# Patient Record
Sex: MALE | Race: OTHER | Employment: OTHER | URBAN - METROPOLITAN AREA
[De-identification: names, ages, dates, MRNs, and addresses within clinical notes are randomized per-mention and may not be internally consistent; named-entity substitution may affect disease eponyms.]

---

## 2017-01-11 ENCOUNTER — TELEPHONE (OUTPATIENT)
Dept: FAMILY MEDICINE CLINIC | Facility: CLINIC | Age: 82
End: 2017-01-11

## 2017-01-11 ENCOUNTER — OFFICE VISIT (OUTPATIENT)
Dept: FAMILY MEDICINE CLINIC | Facility: CLINIC | Age: 82
End: 2017-01-11

## 2017-01-11 VITALS
HEIGHT: 66 IN | DIASTOLIC BLOOD PRESSURE: 72 MMHG | WEIGHT: 200 LBS | SYSTOLIC BLOOD PRESSURE: 138 MMHG | RESPIRATION RATE: 18 BRPM | BODY MASS INDEX: 32.14 KG/M2 | HEART RATE: 98 BPM

## 2017-01-11 DIAGNOSIS — R21 RASH: Primary | ICD-10-CM

## 2017-01-11 PROCEDURE — 99214 OFFICE O/P EST MOD 30 MIN: CPT | Performed by: FAMILY MEDICINE

## 2017-01-11 NOTE — PROGRESS NOTES
Dalton Frankel is a 80year old male here for Patient presents with:  Swelling: Swelling & itching on both legs x 2 months       HPI:     Rash  -started about 2 months ago while in [facility]  -has red itchy rash in both legs  -since he came back, rash and SURGERY Left 4-14-15    Comment MMS done for [facility], well dif, inv to left antihelix, AB    SKIN SURGERY  5/4/2015    Comment MMS - BCC to the Left [facility]  2010    Comment Colon Cancer    CATARACT      COLECTOMY        Family History   Probl Sig: Apply topically 2 (two) times daily.          1/11/2017  Candelaria Baker MD    I spent a total of 25 minutes, half of which was spent counseling/coordinating care regarding counseling on management of rash

## 2017-01-11 NOTE — PATIENT INSTRUCTIONS
-- [place] vaselina de espinoza piernas despues cada shower  -- [place] crema de moisturizer (cetaphil, cerave, aquaphor) de la pharmacia  -- empeza triamcinolone crema 2 veces al ankita por 2 semanas  -- tambien puede usar claritin generico (pastilla) para comezon cada di

## 2017-01-31 ENCOUNTER — TELEPHONE (OUTPATIENT)
Dept: FAMILY MEDICINE CLINIC | Facility: CLINIC | Age: 82
End: 2017-01-31

## 2017-01-31 DIAGNOSIS — Z85.828 PERSONAL HISTORY OF OTHER MALIGNANT NEOPLASM OF SKIN: Primary | ICD-10-CM

## 2017-01-31 NOTE — TELEPHONE ENCOUNTER
Daughter  came in with her father. Needs referrral for Dad's skin cancer on his face. Can we please get Ref to [facility] Dr. Shon Salmeron.   Made appt to see Dr. Doretha Blair on 2/22/2017  Can you please call Rosalie Miramontes at [phone]

## 2017-02-01 NOTE — TELEPHONE ENCOUNTER
The patient's daughter, Monty Dye, was informed that the referral for the Dermatologist was placed. She requested for the patient to see Dr. Roque Leal.  Per Humaira Alarcon RN, I informed the patient that the referral was placed, but she needs to wait until it is ap

## 2017-03-10 ENCOUNTER — TELEPHONE (OUTPATIENT)
Dept: FAMILY MEDICINE CLINIC | Facility: CLINIC | Age: 82
End: 2017-03-10

## 2017-03-10 NOTE — TELEPHONE ENCOUNTER
Pt needs additional visits for [facility] dermatology. Pt has mole he needs removed on his nose.

## 2017-03-10 NOTE — TELEPHONE ENCOUNTER
Patient has a referral in the system for dermatology that has visits still available for the patient

## 2017-03-13 DIAGNOSIS — G20 PARKINSON'S DISEASE (HCC): Primary | ICD-10-CM

## 2017-03-13 NOTE — TELEPHONE ENCOUNTER
Medication: Carbidopa-Levodopa    Date of last refill: 01/28/16  [place]     Last office visit: 05/11/16  Due back to clinic per last office note:  RTN in 3 months by 08/11/16  Date next office visit scheduled: No Future

## 2017-03-14 ENCOUNTER — TELEPHONE (OUTPATIENT)
Dept: FAMILY MEDICINE CLINIC | Facility: CLINIC | Age: 82
End: 2017-03-14

## 2017-03-15 ENCOUNTER — TELEPHONE (OUTPATIENT)
Dept: FAMILY MEDICINE CLINIC | Facility: CLINIC | Age: 82
End: 2017-03-15

## 2017-03-15 DIAGNOSIS — C44.91 BASAL CELL CARCINOMA: Primary | ICD-10-CM

## 2017-03-15 NOTE — TELEPHONE ENCOUNTER
Derm office needs referral specifically for surgery. Needs to have codes for surgery  51283 and 69751. Plus 09672 depends on how many layers they have to take off.  Code for repair is 37587  Dr. Josseline Dale

## 2017-03-15 NOTE — TELEPHONE ENCOUNTER
Jasmin, from [facility] called and states that patient needs referral for upcoming Mohs surgery. 3/29/17  [id number], [id number]  Dx Basal cell carcinoma  Referral in system.

## 2017-04-05 ENCOUNTER — OFFICE VISIT (OUTPATIENT)
Dept: NEUROLOGY | Facility: CLINIC | Age: 82
End: 2017-04-05

## 2017-04-05 VITALS
SYSTOLIC BLOOD PRESSURE: 130 MMHG | DIASTOLIC BLOOD PRESSURE: 60 MMHG | WEIGHT: 209 LBS | RESPIRATION RATE: 20 BRPM | BODY MASS INDEX: 34 KG/M2 | HEART RATE: 76 BPM

## 2017-04-05 DIAGNOSIS — G20 PARKINSON'S DISEASE (HCC): Primary | ICD-10-CM

## 2017-04-05 PROCEDURE — 99213 OFFICE O/P EST LOW 20 MIN: CPT | Performed by: OTHER

## 2017-04-05 NOTE — PATIENT INSTRUCTIONS
Follow up in 1 year  Continue same dose Sinemet 25/100 mg 1 tab three times daily     After your visit at the [facility] office  today,  please direct any follow up questions or medication needs to the staff in our  [place] office so that your concerns test has been approved by your insurer. Depending on your insurance carrier, approval may take 3-10 days. It is highly recommended patients contact their insurance carrier directly to determine coverage.   If test is done without insurance authorization, p

## 2017-04-05 NOTE — PROGRESS NOTES
[employer] Progress Note    Patient presents with:  Parkinson's: Rm. 10: unchanged      HPI:  As per my initial H&P from 3/6/15:  \"Benjy WOODS Garett Aguilera is a [de-identified]year old, with history of HTN and HL, who presents for evaluation of [facility] 2/11/2015     First in [facility], then in [address] [place] [address].    • EKG, abnormal 2/13/2015   • Aortic sclerosis ([address] 2/13/2015   • Colon polyps 9-   • Tubular adenoma 9-   • Atrophic gastritis 9-     chronic, inactive   • Malignant neoplasm of colon (H 0.25  Years: 79        Types: Cigarettes      Quit date: 03/10/2015    Smokeless Status: Never Used                        Alcohol Use: Yes           0.0 oz/week       0 Standard drinks or equivalent per week       Comment: Only drink beer when in [place] Coord:  FNF with mild intention tremor bilaterally; R worse than left  Romberg: absent  Gait: mildly slow, slightly broad based, but independent; able to turn without significant difficulty    Labs:  No new imaging    Imaging:  No new imaging         Impr

## 2017-05-02 DIAGNOSIS — E78.00 HYPERCHOLESTEROLEMIA: ICD-10-CM

## 2017-05-02 DIAGNOSIS — I10 ESSENTIAL HYPERTENSION, BENIGN: Primary | ICD-10-CM

## 2017-05-02 RX ORDER — AMLODIPINE BESYLATE 10 MG/1
10 TABLET ORAL DAILY
Qty: 90 TABLET | Refills: 0 | Status: SHIPPED | OUTPATIENT
Start: 2017-05-02 | End: 2017-08-30

## 2017-05-02 RX ORDER — SIMVASTATIN 20 MG
20 TABLET ORAL NIGHTLY
Qty: 90 TABLET | Refills: 0 | Status: SHIPPED | OUTPATIENT
Start: 2017-05-02 | End: 2017-08-30

## 2017-05-02 NOTE — TELEPHONE ENCOUNTER
Carbidopa-levodopa comes from Dr Shelley Weaver and patient was given rx 4/5/17 for qty 90 + 11 refills  Amlodipine and simvastatin approved qty 90 NR per protocol

## 2017-05-02 NOTE — TELEPHONE ENCOUNTER
Pt's granddaughter called and said pt will need a refill on his medications. He will be leaving to go out of town tomorrow for 3 months.   He needs a refill on:    carbidopa-levodopa  MG Oral Tab  AmLODIPine Besylate 10 MG Oral Tab  simvastatin 20 MG

## 2017-08-30 ENCOUNTER — OFFICE VISIT (OUTPATIENT)
Dept: FAMILY MEDICINE CLINIC | Facility: CLINIC | Age: 82
End: 2017-08-30

## 2017-08-30 VITALS
DIASTOLIC BLOOD PRESSURE: 76 MMHG | HEART RATE: 68 BPM | SYSTOLIC BLOOD PRESSURE: 146 MMHG | WEIGHT: 201.81 LBS | HEIGHT: 66 IN | BODY MASS INDEX: 32.43 KG/M2

## 2017-08-30 DIAGNOSIS — Z85.828 HISTORY OF SKIN CANCER: ICD-10-CM

## 2017-08-30 DIAGNOSIS — I10 ESSENTIAL HYPERTENSION, BENIGN: Primary | ICD-10-CM

## 2017-08-30 DIAGNOSIS — R73.9 HYPERGLYCEMIA: ICD-10-CM

## 2017-08-30 DIAGNOSIS — Z12.5 SCREENING FOR MALIGNANT NEOPLASM OF PROSTATE: ICD-10-CM

## 2017-08-30 DIAGNOSIS — R73.03 PREDIABETES: ICD-10-CM

## 2017-08-30 DIAGNOSIS — I51.89 DIASTOLIC DYSFUNCTION WITHOUT HEART FAILURE: ICD-10-CM

## 2017-08-30 DIAGNOSIS — Z72.51 UNPROTECTED SEX: ICD-10-CM

## 2017-08-30 DIAGNOSIS — Z11.3 ENCOUNTER FOR SCREENING FOR INFECTIONS WITH A PREDOMINANTLY SEXUAL MODE OF TRANSMISSION: ICD-10-CM

## 2017-08-30 DIAGNOSIS — E78.00 HYPERCHOLESTEROLEMIA: ICD-10-CM

## 2017-08-30 DIAGNOSIS — IMO0001 ALCOHOLISM /ALCOHOL ABUSE: ICD-10-CM

## 2017-08-30 DIAGNOSIS — Z11.3 ROUTINE SCREENING FOR STI (SEXUALLY TRANSMITTED INFECTION): ICD-10-CM

## 2017-08-30 DIAGNOSIS — I35.8 AORTIC VALVE SCLEROSIS: ICD-10-CM

## 2017-08-30 PROCEDURE — 99214 OFFICE O/P EST MOD 30 MIN: CPT | Performed by: FAMILY MEDICINE

## 2017-08-30 PROCEDURE — 87491 CHLMYD TRACH DNA AMP PROBE: CPT | Performed by: FAMILY MEDICINE

## 2017-08-30 PROCEDURE — 87591 N.GONORRHOEAE DNA AMP PROB: CPT | Performed by: FAMILY MEDICINE

## 2017-08-30 RX ORDER — AMLODIPINE BESYLATE 10 MG/1
10 TABLET ORAL DAILY
Qty: 90 TABLET | Refills: 1 | Status: SHIPPED | OUTPATIENT
Start: 2017-08-30 | End: 2018-03-02

## 2017-08-30 RX ORDER — SIMVASTATIN 20 MG
20 TABLET ORAL NIGHTLY
Qty: 90 TABLET | Refills: 1 | Status: SHIPPED | OUTPATIENT
Start: 2017-08-30 | End: 2018-03-02

## 2017-08-30 NOTE — PROGRESS NOTES
Sugar Huitron is a 80year old male. HPI:     Patient is accompanied by his daughter-in-law and granddaughter, they help with interpreting especially the granddaughter. HTN: Uncontrolled.   Patient has been off of his amlodipine for at least 2 m Disp: 90 tablet Rfl: 1   triamcinolone acetonide 0.1 % External Cream Apply topically 2 (two) times daily.  Disp: 60 g Rfl: 0      Past Medical History:   Diagnosis Date   • Alcoholism /alcohol abuse ([facility] 8/30/2017    Episodic   • Anesthesia complication Comment: MMS - BCC to the Left [place]   3/29/17: SKIN SURGERY      Comment: MMS- BCC-micronod to right nasofacial sulcus                done by AB  9-: UPPER GI ENDOSCOPY PERFORMED   Social History:    Smoking status: Former Smoker intercostal space right sternal border. VASCULAR: Radial pulses 2+ b/l.   No pretibial pitting edema  GI: normal bowel sounds, NT/ND, no pulsations, no r/r/g, no masses, no HSM appreciated  EXTREMITIES: no cyanosis or clubbing  NEURO: Alert and Oriented x3 Unprotected sex  As above in #7.  - T PALLIDUM SCREENING CASCADE; Future  - HIV AG AB COMBO; Future  - HEPATITIS B SURFACE ANTIBODY; Future  - HEPATITIS B SURFACE ANTIGEN; Future  - HCV ANTIBODY; Future  - CHLAMYDIA/GONOCOCCUS, LAKEISHA    9.  Prediabetes  Reche

## 2017-08-31 ENCOUNTER — LAB ENCOUNTER (OUTPATIENT)
Dept: LAB | Age: 82
End: 2017-08-31
Attending: FAMILY MEDICINE
Payer: MEDICARE

## 2017-08-31 DIAGNOSIS — Z11.3 ENCOUNTER FOR SCREENING FOR INFECTIONS WITH A PREDOMINANTLY SEXUAL MODE OF TRANSMISSION: ICD-10-CM

## 2017-08-31 DIAGNOSIS — R73.9 HYPERGLYCEMIA: ICD-10-CM

## 2017-08-31 DIAGNOSIS — R73.03 PREDIABETES: ICD-10-CM

## 2017-08-31 DIAGNOSIS — Z72.51 UNPROTECTED SEX: ICD-10-CM

## 2017-08-31 DIAGNOSIS — I10 ESSENTIAL HYPERTENSION, BENIGN: ICD-10-CM

## 2017-08-31 DIAGNOSIS — Z12.5 SCREENING FOR MALIGNANT NEOPLASM OF PROSTATE: ICD-10-CM

## 2017-08-31 LAB
ALBUMIN SERPL-MCNC: 3.5 G/DL (ref 3.5–4.8)
ALP LIVER SERPL-CCNC: 71 U/L (ref 45–117)
ALT SERPL-CCNC: 16 U/L (ref 17–63)
AST SERPL-CCNC: 17 U/L (ref 15–41)
BASOPHILS # BLD AUTO: 0.06 X10(3) UL (ref 0–0.1)
BASOPHILS NFR BLD AUTO: 0.9 %
BILIRUB SERPL-MCNC: 0.4 MG/DL (ref 0.1–2)
BUN BLD-MCNC: 15 MG/DL (ref 8–20)
C TRACH DNA SPEC QL NAA+PROBE: NEGATIVE
CALCIUM BLD-MCNC: 9.3 MG/DL (ref 8.3–10.3)
CHLORIDE: 108 MMOL/L (ref 101–111)
CHOLEST SMN-MCNC: 208 MG/DL (ref ?–200)
CO2: 27 MMOL/L (ref 22–32)
COMPLEXED PSA SERPL-MCNC: 2.13 NG/ML (ref 0.01–4)
CREAT BLD-MCNC: 0.6 MG/DL (ref 0.7–1.3)
EOSINOPHIL # BLD AUTO: 0.06 X10(3) UL (ref 0–0.3)
EOSINOPHIL NFR BLD AUTO: 0.9 %
ERYTHROCYTE [DISTWIDTH] IN BLOOD BY AUTOMATED COUNT: 14.1 % (ref 11.5–16)
EST. AVERAGE GLUCOSE BLD GHB EST-MCNC: 131 MG/DL (ref 68–126)
FREE T4: 1 NG/DL (ref 0.9–1.8)
GLUCOSE BLD-MCNC: 99 MG/DL (ref 70–99)
HBA1C MFR BLD HPLC: 6.2 % (ref ?–5.7)
HBV SURFACE AB SER QL: NONREACTIVE
HBV SURFACE AB SERPL IA-ACNC: <3.1 MIU/ML
HBV SURFACE AG SERPL QL IA: NONREACTIVE
HCT VFR BLD AUTO: 53.2 % (ref 37–53)
HDLC SERPL-MCNC: 54 MG/DL (ref 45–?)
HDLC SERPL: 3.85 {RATIO} (ref ?–4.97)
HEPATITIS C VIRUS AB INTERPRETATION: NONREACTIVE
HGB BLD-MCNC: 17.1 G/DL (ref 13–17)
IMMATURE GRANULOCYTE COUNT: 0.02 X10(3) UL (ref 0–1)
IMMATURE GRANULOCYTE RATIO %: 0.3 %
LDLC SERPL CALC-MCNC: 133 MG/DL (ref ?–130)
LDLC SERPL-MCNC: 21 MG/DL (ref 5–40)
LYMPHOCYTES # BLD AUTO: 1.76 X10(3) UL (ref 0.9–4)
LYMPHOCYTES NFR BLD AUTO: 27.4 %
M PROTEIN MFR SERPL ELPH: 7.5 G/DL (ref 6.1–8.3)
MCH RBC QN AUTO: 30 PG (ref 27–33.2)
MCHC RBC AUTO-ENTMCNC: 32.1 G/DL (ref 31–37)
MCV RBC AUTO: 93.3 FL (ref 80–99)
MONOCYTES # BLD AUTO: 0.51 X10(3) UL (ref 0.1–0.6)
MONOCYTES NFR BLD AUTO: 7.9 %
N GONORRHOEA DNA SPEC QL NAA+PROBE: NEGATIVE
NEUTROPHIL ABS PRELIM: 4.01 X10 (3) UL (ref 1.3–6.7)
NEUTROPHILS # BLD AUTO: 4.01 X10(3) UL (ref 1.3–6.7)
NEUTROPHILS NFR BLD AUTO: 62.6 %
NONHDLC SERPL-MCNC: 154 MG/DL (ref ?–130)
PLATELET # BLD AUTO: 196 10(3)UL (ref 150–450)
POTASSIUM SERPL-SCNC: 4 MMOL/L (ref 3.6–5.1)
RBC # BLD AUTO: 5.7 X10(6)UL (ref 3.8–5.8)
RED CELL DISTRIBUTION WIDTH-SD: 48.8 FL (ref 35.1–46.3)
SODIUM SERPL-SCNC: 142 MMOL/L (ref 136–144)
T PALLIDUM AB SER QL IA: NONREACTIVE
TRIGLYCERIDES: 105 MG/DL (ref ?–150)
TSI SER-ACNC: 1.81 MIU/ML (ref 0.35–5.5)
WBC # BLD AUTO: 6.4 X10(3) UL (ref 4–13)

## 2017-08-31 PROCEDURE — 36415 COLL VENOUS BLD VENIPUNCTURE: CPT | Performed by: FAMILY MEDICINE

## 2017-08-31 PROCEDURE — 86803 HEPATITIS C AB TEST: CPT | Performed by: FAMILY MEDICINE

## 2017-08-31 PROCEDURE — 80053 COMPREHEN METABOLIC PANEL: CPT | Performed by: FAMILY MEDICINE

## 2017-08-31 PROCEDURE — 84439 ASSAY OF FREE THYROXINE: CPT | Performed by: FAMILY MEDICINE

## 2017-08-31 PROCEDURE — 84443 ASSAY THYROID STIM HORMONE: CPT | Performed by: FAMILY MEDICINE

## 2017-08-31 PROCEDURE — 86780 TREPONEMA PALLIDUM: CPT | Performed by: FAMILY MEDICINE

## 2017-08-31 PROCEDURE — 87389 HIV-1 AG W/HIV-1&-2 AB AG IA: CPT | Performed by: FAMILY MEDICINE

## 2017-08-31 PROCEDURE — 86706 HEP B SURFACE ANTIBODY: CPT | Performed by: FAMILY MEDICINE

## 2017-08-31 PROCEDURE — 80061 LIPID PANEL: CPT | Performed by: FAMILY MEDICINE

## 2017-08-31 PROCEDURE — 83036 HEMOGLOBIN GLYCOSYLATED A1C: CPT | Performed by: FAMILY MEDICINE

## 2017-08-31 PROCEDURE — 85025 COMPLETE CBC W/AUTO DIFF WBC: CPT | Performed by: FAMILY MEDICINE

## 2017-08-31 PROCEDURE — G0103 PSA SCREENING: HCPCS | Performed by: FAMILY MEDICINE

## 2017-08-31 PROCEDURE — 87340 HEPATITIS B SURFACE AG IA: CPT | Performed by: FAMILY MEDICINE

## 2017-09-06 ENCOUNTER — TELEPHONE (OUTPATIENT)
Dept: FAMILY MEDICINE CLINIC | Facility: CLINIC | Age: 82
End: 2017-09-06

## 2017-09-06 NOTE — TELEPHONE ENCOUNTER
----- Message from Demetra Gómez DO sent at 9/5/2017  6:17 PM CDT -----  Please call patient's daughter or granddaughter: Screening for sexually transmitted infections are negative.   Please have patient make an appointment to see me for follow-up on the

## 2017-09-06 NOTE — TELEPHONE ENCOUNTER
The patient's granddaughter, Wilda Mcardle, was informed of the patient's test results and Dr. Eric Alejandre recommendations. Per Dr. Amos Maria, follow up appointment was scheduled to discuss other blood test results and prediabetes worsening.

## 2017-09-11 ENCOUNTER — OFFICE VISIT (OUTPATIENT)
Dept: FAMILY MEDICINE CLINIC | Facility: CLINIC | Age: 82
End: 2017-09-11

## 2017-09-11 VITALS
RESPIRATION RATE: 16 BRPM | HEIGHT: 66 IN | TEMPERATURE: 98 F | BODY MASS INDEX: 32.78 KG/M2 | SYSTOLIC BLOOD PRESSURE: 145 MMHG | WEIGHT: 204 LBS | DIASTOLIC BLOOD PRESSURE: 68 MMHG | HEART RATE: 64 BPM

## 2017-09-11 DIAGNOSIS — R73.9 HYPERGLYCEMIA: ICD-10-CM

## 2017-09-11 DIAGNOSIS — I10 ESSENTIAL HYPERTENSION, BENIGN: Primary | ICD-10-CM

## 2017-09-11 DIAGNOSIS — Z23 NEED FOR INFLUENZA VACCINATION: ICD-10-CM

## 2017-09-11 DIAGNOSIS — R73.03 PREDIABETES: ICD-10-CM

## 2017-09-11 DIAGNOSIS — E78.00 HYPERCHOLESTEROLEMIA: ICD-10-CM

## 2017-09-11 PROCEDURE — 90653 IIV ADJUVANT VACCINE IM: CPT | Performed by: FAMILY MEDICINE

## 2017-09-11 PROCEDURE — 99214 OFFICE O/P EST MOD 30 MIN: CPT | Performed by: FAMILY MEDICINE

## 2017-09-11 PROCEDURE — G0008 ADMIN INFLUENZA VIRUS VAC: HCPCS | Performed by: FAMILY MEDICINE

## 2017-09-11 RX ORDER — CHLORTHALIDONE 25 MG/1
25 TABLET ORAL EVERY MORNING
Qty: 30 TABLET | Refills: 2 | Status: SHIPPED | OUTPATIENT
Start: 2017-09-11 | End: 2018-03-02

## 2017-09-11 NOTE — PATIENT INSTRUCTIONS
Hiperglucemia (alto nivel de azúcar en la sean)    El exceso de glucosa (azúcar) en la sean se denomina “hiperglucemia” o “hiperglicemia”. Un nivel de azúcar en la sean alto puede producir madison afección peligrosa llamada cetoacidosis.  En casos grave · Si está enfermo, siga espinoza plan para los días de enfermedad.   · Cumpla con espinoza plan de comidas. Coma solo la cantidad de comida que indica espinoza plan de comidas.   · Siga espinoza plan de ejercicio físico.  · Use espinoza insulina o tome eagle medicamentos para la diabetes

## 2017-09-11 NOTE — PROGRESS NOTES
Thuy Ramey is a 80year old male. HPI:     Patient is accompanied by his daughter-in-law, granddaughter and great granddaughter. His granddaughter helps with translating. HTN:    Uncontrolled. Severity is mild.   Patient restarted the amlo abnormal 2/13/2015   • Esophageal reflux     no longer a problem/ resolved   • Essential hypertension, benign 2/5/2015    Uncontrolled    • High blood pressure    • High cholesterol    • History of hernia repair 2/11/2015    First in [facility], then in [address] [place] [address]. Neg    • Heart Disease Neg    • Stroke Neg      REVIEW OF SYSTEMS:   GENERAL HEALTH: overall feels well, no fever, no change in weight  SKIN: denies any unusual skin lesions or rashes   RESPIRATORY: Denies: GIBRAN/MURPHY/Cough/Wheeze/Orthopnea/PND  CARDIOVASCULA 35   Total Bilirubin      0.1 - 2.0 mg/dL 0.4  0.6   TOTAL PROTEIN      6.1 - 8.3 g/dL 7.5  7.6   Albumin      3.5 - 4.8 g/dL 3.5  3.7   Sodium      136 - 144 mmol/L 142  139   Potassium      3.6 - 5.1 mmol/L 4.0  4.3   Chloride      101 - 111 mmol/L 108 effects and precautions discussed, patient verbalizes understanding. Questions encouraged and answered to patient's satisfaction.           Orders Placed This Encounter      Flu Vaccine, High Dose (V04.81) [id number]    Meds & Refills for this Visit:  Signed P

## 2017-09-26 ENCOUNTER — HOSPITAL ENCOUNTER (OUTPATIENT)
Age: 82
Discharge: HOME | End: 2017-09-26
Payer: MEDICARE

## 2017-09-26 ENCOUNTER — APPOINTMENT (OUTPATIENT)
Dept: CT IMAGING | Age: 82
End: 2017-09-26
Attending: PHYSICIAN ASSISTANT
Payer: MEDICARE

## 2017-09-26 VITALS
OXYGEN SATURATION: 95 % | HEIGHT: 67 IN | HEART RATE: 65 BPM | DIASTOLIC BLOOD PRESSURE: 71 MMHG | WEIGHT: 204 LBS | BODY MASS INDEX: 32.02 KG/M2 | SYSTOLIC BLOOD PRESSURE: 165 MMHG | RESPIRATION RATE: 18 BRPM | TEMPERATURE: 98 F

## 2017-09-26 DIAGNOSIS — R19.7 DIARRHEA, UNSPECIFIED TYPE: ICD-10-CM

## 2017-09-26 DIAGNOSIS — T81.30XA ABDOMINAL WOUND DEHISCENCE, INITIAL ENCOUNTER: Primary | ICD-10-CM

## 2017-09-26 DIAGNOSIS — E87.6 HYPOKALEMIA: ICD-10-CM

## 2017-09-26 DIAGNOSIS — K43.9 VENTRAL HERNIA WITHOUT OBSTRUCTION OR GANGRENE: ICD-10-CM

## 2017-09-26 LAB
#LYMPHOCYTE IC: 1.8 X10ˆ3/UL (ref 0.9–3.2)
#MXD IC: 0.7 X10ˆ3/UL (ref 0.1–1)
#NEUTROPHIL IC: 4.1 X10ˆ3/UL (ref 1.3–6.7)
CREAT SERPL-MCNC: 0.8 MG/DL (ref 0.7–1.2)
CREAT SERPL-MCNC: 0.8 MG/DL (ref 0.7–1.2)
GLUCOSE BLD-MCNC: 116 MG/DL (ref 65–99)
GLUCOSE BLD-MCNC: 117 MG/DL (ref 65–99)
HCT IC: 47.9 % (ref 37–54)
HGB IC: 16 G/DL (ref 12.6–17.4)
ISTAT BLOOD GAS TCO2: 28 MMOL/L (ref 22–32)
ISTAT BLOOD GAS TCO2: 28 MMOL/L (ref 22–32)
ISTAT BUN: 18 MG/DL (ref 8–20)
ISTAT BUN: 19 MG/DL (ref 8–20)
ISTAT CHLORIDE: 100 MMOL/L (ref 101–111)
ISTAT CHLORIDE: 99 MMOL/L (ref 101–111)
ISTAT HEMATOCRIT: 48 % (ref 37–54)
ISTAT HEMATOCRIT: 49 % (ref 37–54)
ISTAT IONIZED CALCIUM: 1.1 MMOL/L (ref 1.12–1.32)
ISTAT IONIZED CALCIUM: 1.17 MMOL/L (ref 1.12–1.32)
ISTAT POTASSIUM: 2.8 MMOL/L (ref 3.6–5.1)
ISTAT POTASSIUM: 3.1 MMOL/L (ref 3.6–5.1)
ISTAT SODIUM: 141 MMOL/L (ref 136–144)
ISTAT SODIUM: 141 MMOL/L (ref 136–144)
LYMPHOCYTES NFR BLD AUTO: 27.1 %
MCH IC: 30 PG (ref 27–33.2)
MCHC IC: 33.4 G/DL (ref 31–37)
MCV IC: 89.9 FL (ref 80–99)
MIXED CELL %: 11.3 %
NEUTROPHILS NFR BLD AUTO: 61.6 %
PLT IC: 209 X10ˆ3/UL (ref 150–450)
POCT BILIRUBIN URINE: NEGATIVE
POCT BLOOD URINE: NEGATIVE
POCT GLUCOSE URINE: NEGATIVE MG/DL
POCT KETONE URINE: NEGATIVE MG/DL
POCT LEUKOCYTE ESTERASE URINE: NEGATIVE
POCT NITRITE URINE: NEGATIVE
POCT PH URINE: 6.5 (ref 5–8)
POCT PROTEIN URINE: NEGATIVE MG/DL
POCT SPECIFIC GRAVITY URINE: 1.02
POCT URINE CLARITY: CLEAR
POCT URINE COLOR: YELLOW
POCT UROBILINOGEN URINE: 0.2 MG/DL
RBC IC: 5.33 X10ˆ6/UL (ref 3.8–5.8)
WBC IC: 6.6 X10ˆ3/UL (ref 4–13)

## 2017-09-26 PROCEDURE — 93010 ELECTROCARDIOGRAM REPORT: CPT

## 2017-09-26 PROCEDURE — 93005 ELECTROCARDIOGRAM TRACING: CPT

## 2017-09-26 PROCEDURE — 36415 COLL VENOUS BLD VENIPUNCTURE: CPT

## 2017-09-26 PROCEDURE — 80047 BASIC METABLC PNL IONIZED CA: CPT

## 2017-09-26 PROCEDURE — 81002 URINALYSIS NONAUTO W/O SCOPE: CPT | Performed by: PHYSICIAN ASSISTANT

## 2017-09-26 PROCEDURE — 85025 COMPLETE CBC W/AUTO DIFF WBC: CPT | Performed by: PHYSICIAN ASSISTANT

## 2017-09-26 PROCEDURE — 74177 CT ABD & PELVIS W/CONTRAST: CPT | Performed by: PHYSICIAN ASSISTANT

## 2017-09-26 PROCEDURE — 99215 OFFICE O/P EST HI 40 MIN: CPT

## 2017-09-26 RX ORDER — POTASSIUM CHLORIDE 20 MEQ/1
40 TABLET, EXTENDED RELEASE ORAL ONCE
Status: COMPLETED | OUTPATIENT
Start: 2017-09-26 | End: 2017-09-26

## 2017-09-26 RX ORDER — POTASSIUM CHLORIDE 750 MG/1
20 TABLET, FILM COATED, EXTENDED RELEASE ORAL 2 TIMES DAILY
Qty: 14 TABLET | Refills: 0 | Status: SHIPPED | OUTPATIENT
Start: 2017-09-26 | End: 2018-03-29

## 2017-09-26 NOTE — ED INITIAL ASSESSMENT (HPI)
Patient presents with heart shaped open wound approx 1las3jw to midabdomen x one day. s/p colon resection 2010. Elidia in [facility] states patient was mowing the grass yesterday and patient noted the wound today after showering. Beefy red and draining serosanguinous l

## 2017-09-26 NOTE — ED PROVIDER NOTES
Patient Seen in: [facility]    History   Patient presents with:  Wound    Stated Complaint: puncture wound on stomach 1 days    HPI    Daisy Gomez an 77-year-old male who comes in today with his daughter-in-law complaining of an ope • Parkinson's disease ([facility] 2/5/2015   • Personal history of antineoplastic chemotherapy     2010 last treatment   • PONV (postoperative nausea and vomiting)    • Retained suture 9/29/2015   • Shoulder injury 1987    left    • Tubular adenoma 9-   • Current:BP (!) 165/71   Pulse 65   Temp 98.1 °F (36.7 °C) (Temporal)   Resp 18   Ht 170.2 cm (5' 7\")   Wt 92.5 kg   SpO2 95%   BMI 31.95 kg/m²         Physical Exam   Constitutional: He is oriented to person, place, and time.  He appears well-developed and Rate, intervals and axes as noted on EKG Report.   Rate: 63bpm  Rhythm: Sinus Rhythm  Reading: AV block and RBBB, discussed with Dr Dixon Bender and compared to previous in 2016 no acute changes, read by computer printout as atrial flutter no     --repeat EKG PROCEDURE:  CT ABDOMEN+PELVIS(CONTRAST ONLY)(CPT=74177)  COMPARISON:  MIGUEL CT ABDOMEN+PELVIS(CPT=74176), 2/14/2015, 10:17.   INDICATIONS:  puncture wound on stomach 1 days  TECHNIQUE:  CT scanning was performed from the dome of the diaphragm to the unchanged. URINARY BLADDER:  Nondistended. PELVIC NODES:  None enlarged. PELVIC ORGANS:  Stable moderate to severe prostatomegaly. BONES:  Stable moderate to severe degenerative changes. LUNG BASES:  Atelectasis and or scarring. No pleural effusion.  OTHER: Potassium Chloride ER 10 MEQ Oral Tab CR          Sig: Take 2 tablets (20 mEq total) by mouth 2 (two) times daily.           Dispense:  14 tablet          Refill:  0    ============================================================  ED Course  -----------

## 2017-09-27 ENCOUNTER — TELEPHONE (OUTPATIENT)
Dept: FAMILY MEDICINE CLINIC | Facility: CLINIC | Age: 82
End: 2017-09-27

## 2017-09-27 ENCOUNTER — TELEPHONE (OUTPATIENT)
Dept: NEUROLOGY | Facility: CLINIC | Age: 82
End: 2017-09-27

## 2017-09-27 DIAGNOSIS — T81.30XA ABDOMINAL WOUND DEHISCENCE, INITIAL ENCOUNTER: Primary | ICD-10-CM

## 2017-09-27 NOTE — TELEPHONE ENCOUNTER
Please call pt's daughter in law Milady Rodriguez or his granddaughter Shay Bravo, pt was referred to a surgeon for consultation, I would like him to see a different surgeon for consultation instead.   Please refer him to Dr. Annelise Che, please provide Dr. West Altman

## 2017-09-27 NOTE — TELEPHONE ENCOUNTER
I spoke with Cristinsarahy Fothergill, pt's granddaughter, she was informed that Dr. Zina Dubose would like him to see Dr. Nate Benitez for follow up, she was given contact information for his office and encouraged to schedule a follow up appt.

## 2017-09-28 NOTE — TELEPHONE ENCOUNTER
Spoke to Jeremías hinojosa and she said that pt has an appt with a doctor in Dr. Radha Coker office on 10/3/17 for a consult. She does not know the doctor's name though. Referral was placed in system yesterday.   No need for patient to make an appt here and Sweta

## 2017-10-03 ENCOUNTER — OFFICE VISIT (OUTPATIENT)
Dept: SURGERY | Facility: CLINIC | Age: 82
End: 2017-10-03

## 2017-10-03 VITALS
TEMPERATURE: 98 F | HEIGHT: 66 IN | SYSTOLIC BLOOD PRESSURE: 139 MMHG | WEIGHT: 204 LBS | DIASTOLIC BLOOD PRESSURE: 81 MMHG | HEART RATE: 73 BPM | BODY MASS INDEX: 32.78 KG/M2

## 2017-10-03 DIAGNOSIS — I10 ESSENTIAL HYPERTENSION, BENIGN: ICD-10-CM

## 2017-10-03 DIAGNOSIS — G20 PARKINSON'S DISEASE (HCC): ICD-10-CM

## 2017-10-03 DIAGNOSIS — Z85.038 HISTORY OF MALIGNANT NEOPLASM OF COLON: ICD-10-CM

## 2017-10-03 DIAGNOSIS — T81.89XA SUTURE GRANULOMA, INITIAL ENCOUNTER: Primary | ICD-10-CM

## 2017-10-03 PROCEDURE — 99203 OFFICE O/P NEW LOW 30 MIN: CPT | Performed by: SURGERY

## 2017-10-03 NOTE — H&P
New Patient Visit Note       Active Problems      1. Suture granuloma, initial encounter    2. Parkinson's disease ([facility]    3. Essential hypertension, benign    4.  History of malignant neoplasm of colon        Chief Complaint   Patient presents with:  Patience Luis Felipe focal fluid collection/abscess. Mild deformity of the anterior abdominal wall,   right lower quadrant is unchanged. URINARY BLADDER:  Nondistended. PELVIC NODES:  None enlarged. PELVIC ORGANS:  Stable moderate to severe prostatomegaly.   BONES:  Stable m surgery scheduled 06/30/16, also left shoulder   • Other and unspecified hyperlipidemia    • Parkinson disease ([facility] 2014   • Parkinson disease ([facility]    • Parkinson's disease ([facility] 2/5/2015   • Personal history of antineoplastic chemotherapy     2010 last coffee and 1 can of coke daily  Exercise                Yes    Comment:walks 3 times weekly         Current Outpatient Prescriptions:  Potassium Chloride ER 10 MEQ Oral Tab CR Take 2 tablets (20 mEq total) by mouth 2 (two) times daily.  Disp: 14 tablet Rfl: Pulmonary/Chest: Effort normal and breath sounds normal. No respiratory distress. He has no wheezes. Abdominal: Bowel sounds are normal. He exhibits no distension. There is no tenderness. There is no rebound.        Healed midline incision with a 1.5 cm

## 2017-10-09 ENCOUNTER — APPOINTMENT (OUTPATIENT)
Dept: LAB | Age: 82
End: 2017-10-09
Attending: SURGERY
Payer: MEDICARE

## 2017-10-09 DIAGNOSIS — Z01.818 PRE-OP TESTING: ICD-10-CM

## 2017-10-09 PROCEDURE — 93010 ELECTROCARDIOGRAM REPORT: CPT | Performed by: INTERNAL MEDICINE

## 2017-10-09 PROCEDURE — 36415 COLL VENOUS BLD VENIPUNCTURE: CPT

## 2017-10-09 PROCEDURE — 93005 ELECTROCARDIOGRAM TRACING: CPT

## 2017-10-09 PROCEDURE — 80053 COMPREHEN METABOLIC PANEL: CPT

## 2017-10-11 ENCOUNTER — TELEPHONE (OUTPATIENT)
Dept: FAMILY MEDICINE CLINIC | Facility: CLINIC | Age: 82
End: 2017-10-11

## 2017-10-11 DIAGNOSIS — T81.89XA SUTURE GRANULOMA, INITIAL ENCOUNTER: Primary | ICD-10-CM

## 2017-10-11 NOTE — TELEPHONE ENCOUNTER
Dr Charles Urbina is the surgeon  Phone # is [phone]    Left message for Vania Lee that she will need to contact Dr Kate Martínez office and have them fax over all information regarding this referral to [phone].  We cannot process referral without this informa

## 2017-10-11 NOTE — TELEPHONE ENCOUNTER
Yesi Ohara is calling to let the office know that Benjy's referral needs to be for Dr Michael Cha, she thinks, whoever is with Dr Noreen Cerna office and [facility], otherwise [employer] will not pay for it.  Any questions please call Macie

## 2017-10-18 ENCOUNTER — TELEPHONE (OUTPATIENT)
Dept: SURGERY | Facility: CLINIC | Age: 82
End: 2017-10-18

## 2017-10-19 ENCOUNTER — LABORATORY ENCOUNTER (OUTPATIENT)
Dept: LAB | Age: 82
End: 2017-10-19
Attending: SURGERY
Payer: MEDICARE

## 2017-10-19 DIAGNOSIS — T81.89XA: Primary | ICD-10-CM

## 2017-10-19 PROCEDURE — 88305 TISSUE EXAM BY PATHOLOGIST: CPT

## 2017-10-27 ENCOUNTER — TELEPHONE (OUTPATIENT)
Dept: NEUROLOGY | Facility: CLINIC | Age: 82
End: 2017-10-27

## 2017-11-02 ENCOUNTER — OFFICE VISIT (OUTPATIENT)
Dept: SURGERY | Facility: CLINIC | Age: 82
End: 2017-11-02

## 2017-11-02 VITALS
TEMPERATURE: 98 F | HEIGHT: 66 IN | SYSTOLIC BLOOD PRESSURE: 135 MMHG | RESPIRATION RATE: 17 BRPM | BODY MASS INDEX: 33.01 KG/M2 | DIASTOLIC BLOOD PRESSURE: 72 MMHG | HEART RATE: 71 BPM | WEIGHT: 205.38 LBS

## 2017-11-02 DIAGNOSIS — K43.2 VENTRAL INCISIONAL HERNIA: ICD-10-CM

## 2017-11-02 DIAGNOSIS — Z85.038 HISTORY OF MALIGNANT NEOPLASM OF COLON: ICD-10-CM

## 2017-11-02 DIAGNOSIS — T81.89XD SUTURE GRANULOMA, SUBSEQUENT ENCOUNTER: Primary | ICD-10-CM

## 2017-11-02 DIAGNOSIS — Z98.890 HISTORY OF HERNIA REPAIR: ICD-10-CM

## 2017-11-02 DIAGNOSIS — Z87.19 HISTORY OF HERNIA REPAIR: ICD-10-CM

## 2017-11-02 PROCEDURE — 99024 POSTOP FOLLOW-UP VISIT: CPT | Performed by: PHYSICIAN ASSISTANT

## 2017-11-02 RX ORDER — HYDROCODONE BITARTRATE AND ACETAMINOPHEN 5; 325 MG/1; MG/1
TABLET ORAL
Refills: 0 | COMMUNITY
Start: 2017-10-19 | End: 2018-01-22 | Stop reason: ALTCHOICE

## 2017-11-02 NOTE — PROGRESS NOTES
Post Operative Visit Note       Active Problems  1. Suture granuloma, subsequent encounter    2. History of malignant neoplasm of colon    3. History of hernia repair    4.  Ventral incisional hernia         Chief Complaint   Patient presents with:  Suture Parkinson disease ([facility] 2014   • Parkinson disease ([facility]    • Parkinson's disease ([facility] 2/5/2015   • Personal history of antineoplastic chemotherapy     2010 last treatment   • PONV (postoperative nausea and vomiting)    • Retained suture 9/29/2015   • Radha weekly         Current Outpatient Prescriptions:  Potassium Chloride ER 10 MEQ Oral Tab CR Take 2 tablets (20 mEq total) by mouth 2 (two) times daily.  Disp: 14 tablet Rfl: 0   chlorthalidone 25 MG Oral Tab Take 1 tablet (25 mg total) by mouth every morning reviewed.           Assessment   Suture granuloma, subsequent encounter  (primary encounter diagnosis)  History of malignant neoplasm of colon  History of hernia repair  Ventral incisional hernia      Plan   At today's office visit discussed with the patien

## 2017-11-02 NOTE — PROGRESS NOTES
Post Operative Visit Note       Active Problems  No diagnosis found. Chief Complaint   No chief complaint on file. History of Present Illness         Allergies  Anastasiia Brar has No Known Allergies.     Past Medical / Surgical / Social / Family Histor COLONOSCOPY      Comment: Dr. Ava Giv[place], [place]  No date: HERNIA SURGERY  4-14-15: SKIN SURGERY Left      Comment: MMS done for [facility], well dif, inv to left                antihelix, AB  5/4/2015: SKIN SURGERY      Comment: MMS - BCC to the Left Restoration   3/2 Systems has been reviewed by me during today. Review of Systems   Constitutional: Negative for chills, diaphoresis, fatigue, fever and unexpected weight change. HENT: Negative for hearing loss, nosebleeds, sore throat and trouble swallowing.     Respirat

## 2017-11-09 ENCOUNTER — OFFICE VISIT (OUTPATIENT)
Dept: FAMILY MEDICINE CLINIC | Facility: CLINIC | Age: 82
End: 2017-11-09

## 2017-11-09 VITALS
DIASTOLIC BLOOD PRESSURE: 76 MMHG | HEART RATE: 76 BPM | BODY MASS INDEX: 32.85 KG/M2 | WEIGHT: 204.38 LBS | SYSTOLIC BLOOD PRESSURE: 132 MMHG | HEIGHT: 66 IN

## 2017-11-09 DIAGNOSIS — D58.2 ELEVATED HEMOGLOBIN (HCC): ICD-10-CM

## 2017-11-09 DIAGNOSIS — G89.29 CHRONIC RIGHT SHOULDER PAIN: ICD-10-CM

## 2017-11-09 DIAGNOSIS — M19.011 OSTEOARTHRITIS OF RIGHT GLENOHUMERAL JOINT: ICD-10-CM

## 2017-11-09 DIAGNOSIS — R35.1 NOCTURIA: ICD-10-CM

## 2017-11-09 DIAGNOSIS — I10 ESSENTIAL HYPERTENSION, BENIGN: ICD-10-CM

## 2017-11-09 DIAGNOSIS — Z85.038 HISTORY OF COLON CANCER: ICD-10-CM

## 2017-11-09 DIAGNOSIS — Z00.00 MEDICARE ANNUAL WELLNESS VISIT, SUBSEQUENT: Primary | ICD-10-CM

## 2017-11-09 DIAGNOSIS — M25.511 CHRONIC RIGHT SHOULDER PAIN: ICD-10-CM

## 2017-11-09 DIAGNOSIS — R73.9 HYPERGLYCEMIA: ICD-10-CM

## 2017-11-09 DIAGNOSIS — Z23 NEED FOR VACCINATION: ICD-10-CM

## 2017-11-09 PROCEDURE — G0439 PPPS, SUBSEQ VISIT: HCPCS | Performed by: FAMILY MEDICINE

## 2017-11-09 PROCEDURE — 99214 OFFICE O/P EST MOD 30 MIN: CPT | Performed by: FAMILY MEDICINE

## 2017-11-09 PROCEDURE — G0009 ADMIN PNEUMOCOCCAL VACCINE: HCPCS | Performed by: FAMILY MEDICINE

## 2017-11-09 PROCEDURE — 90670 PCV13 VACCINE IM: CPT | Performed by: FAMILY MEDICINE

## 2017-11-09 RX ORDER — TRAMADOL HYDROCHLORIDE 50 MG/1
50 TABLET ORAL 2 TIMES DAILY PRN
Qty: 60 TABLET | Refills: 0 | Status: SHIPPED | OUTPATIENT
Start: 2017-11-09 | End: 2020-02-11 | Stop reason: ALTCHOICE

## 2017-11-09 NOTE — PATIENT INSTRUCTIONS
SCHEDULING [facility] LAB APPOINTMENTS ONLINE    Lab appointments can now be scheduled online at [email or web address]    · Go to [email or web address]  · In Search type Lab  · Click \"Lab services\"  · Click \"Schedule Your Test Online\"  · Follow the prompts  · earlier if a member of your immediate family has had colon cancer, especially if their cancer occurred before they were 48years old. Prostate cancer tests:  The older way of looking for prostate cancer, the rectal exam, is no longer regarded as the best thyroid tests, and urine tests. When you have no symptoms of illness, you should discuss the pros and cons of these and other tests with your healthcare provider. Each test involves some expense. What shots do I need?    The following shots are recommende You should expect your healthcare provider to advise you regularly on other ways to stay healthy. Some of these may include:   Substance use: Do not use tobacco or illegal drugs.  Avoid using alcohol while driving, swimming, boating, etc.   Diet and exerc

## 2017-11-09 NOTE — PROGRESS NOTES
HPI:   Asuncion Bush is a 80year old male who presents for a Medicare Subsequent Annual Wellness visit (Pt already had Initial Annual Wellness), hypertension, history of colon cancer, shoulder pain, elevated hemoglobin and elevated blood sugar. Results  Component Value Date   CHOLEST 208 (H) 08/31/2017   HDL 54 08/31/2017    (H) 08/31/2017   TRIG 105 08/31/2017          Last Chemistry Labs:     Lab Results  Component Value Date   AST 16 10/09/2017   ALT 11 (L) 10/09/2017   CA 9.2 10/09/201 Osteoarthritis; Other and unspecified hyperlipidemia; Parkinson disease ([facility] (2014); Parkinson disease ([facility] Parkinson's disease ([facility] (2/5/2015); Personal history of antineoplastic chemotherapy; PONV (postoperative nausea and vomiting);  Retained suture lb 6.4 oz   BMI 32.99 kg/m²   General appearance: alert, appears stated age and cooperative  Head: Normocephalic, without obvious abnormality, atraumatic  Eyes: EOMI, PERRLA, normal conjunctiva  Neck: no adenopathy, no JVD, supple, symmetrical, trachea mid of right glenohumeral joint  Consult orthopedic specialist.  New prescription for tramadol for pain.    - ORTHOPEDIC - INTERNAL  - TraMADol HCl 50 MG Oral Tab; Take 1 tablet (50 mg total) by mouth 2 (two) times daily as needed for Pain.   Dispense: 60 table (50 mg total) by mouth 2 (two) times daily as needed for Pain. Nocturia  -     UROLOGY - INTERNAL    Hyperglycemia  -     HEMOGLOBIN A1C; Future         Mr. Binta Colon does not currently take aspirin.  We discussed the risks and benefits of aspirin therapy help    Taking medications as prescribed: Need some help    Are you able to afford your medications?: Yes    Hearing Problems?: No     Functional Status     Hearing Problems?: No    Vision Problems? : No    Difficulty walking?: Yes    Difficulty dressing o Cardiovascular Disease Screening     LDL Annually LDL-CHOLESTEROL (mg/dL (calc))   Date Value   02/10/2015 91     LDL Cholesterol (mg/dL)   Date Value   08/31/2017 133 (H)        EKG - w/ Initial Preventative Physical Exam only, or if medically necessary No flowsheet data found. Drug Serum Conc  Annually No results found for: DIGOXIN, DIG, VALP No flowsheet data found. Diabetes      HgbA1C  Annually HgbA1C (%)   Date Value   08/31/2017 6.2 (H)       No flowsheet data found.     Creat/alb ratio  Annual

## 2017-11-14 PROBLEM — R73.9 HYPERGLYCEMIA: Status: ACTIVE | Noted: 2017-11-14

## 2017-11-14 PROBLEM — R35.1 NOCTURIA: Status: ACTIVE | Noted: 2017-11-14

## 2017-11-20 ENCOUNTER — OFFICE VISIT (OUTPATIENT)
Dept: SURGERY | Facility: CLINIC | Age: 82
End: 2017-11-20

## 2017-11-20 VITALS
HEIGHT: 66 IN | TEMPERATURE: 98 F | SYSTOLIC BLOOD PRESSURE: 134 MMHG | DIASTOLIC BLOOD PRESSURE: 74 MMHG | BODY MASS INDEX: 32.78 KG/M2 | HEART RATE: 73 BPM | WEIGHT: 204 LBS

## 2017-11-20 DIAGNOSIS — T81.89XD SUTURE GRANULOMA, SUBSEQUENT ENCOUNTER: ICD-10-CM

## 2017-11-20 DIAGNOSIS — K43.9 VENTRAL HERNIA WITHOUT OBSTRUCTION OR GANGRENE: ICD-10-CM

## 2017-11-20 DIAGNOSIS — G20 PARKINSON'S DISEASE (HCC): ICD-10-CM

## 2017-11-20 DIAGNOSIS — K43.2 VENTRAL INCISIONAL HERNIA: Primary | ICD-10-CM

## 2017-11-20 DIAGNOSIS — Z85.038 HISTORY OF MALIGNANT NEOPLASM OF COLON: ICD-10-CM

## 2017-11-20 PROCEDURE — 99213 OFFICE O/P EST LOW 20 MIN: CPT | Performed by: SURGERY

## 2017-11-20 RX ORDER — POLYETHYLENE GLYCOL 3350, SODIUM CHLORIDE, SODIUM BICARBONATE, POTASSIUM CHLORIDE 420; 11.2; 5.72; 1.48 G/4L; G/4L; G/4L; G/4L
POWDER, FOR SOLUTION ORAL
Qty: 1 BOTTLE | Refills: 0 | Status: SHIPPED | OUTPATIENT
Start: 2017-11-20 | End: 2018-02-20

## 2017-11-20 NOTE — PROGRESS NOTES
Follow Up Visit Note       Active Problems      1. Ventral incisional hernia    2. Parkinson's disease ([address]    3. Suture granuloma, subsequent encounter    4.  History of malignant neoplasm of colon          Chief Complaint   Patient presents with:  Hernia essential hypertension      Past Surgical History:  No date: APPENDECTOMY  No date: CATARACT  No date: CATARACT SURGERY, COMPLEX      Comment: right eye  4/2014: CATARACT SURGERY, COMPLEX      Comment: left eye  No date: COLECTOMY  2010: COLON SURGERY PO Q 6 H PRN P Disp:  Rfl: 0   Potassium Chloride ER 10 MEQ Oral Tab CR Take 2 tablets (20 mEq total) by mouth 2 (two) times daily. Disp: 14 tablet Rfl: 0   chlorthalidone 25 MG Oral Tab Take 1 tablet (25 mg total) by mouth every morning.  Disp: 30 tablet R distress. Abdominal: Soft. Bowel sounds are normal. He exhibits no distension. There is no tenderness. There is no rebound. Nursing note and vitals reviewed.            3 cm x 3 cm palpable fascial defect at the superior aspect of the incision with a re

## 2018-01-12 ENCOUNTER — TELEPHONE (OUTPATIENT)
Dept: SURGERY | Facility: CLINIC | Age: 83
End: 2018-01-12

## 2018-01-15 ENCOUNTER — OFFICE VISIT (OUTPATIENT)
Dept: FAMILY MEDICINE CLINIC | Facility: CLINIC | Age: 83
End: 2018-01-15

## 2018-01-15 VITALS
BODY MASS INDEX: 33.3 KG/M2 | TEMPERATURE: 98 F | RESPIRATION RATE: 16 BRPM | WEIGHT: 207.19 LBS | HEART RATE: 68 BPM | SYSTOLIC BLOOD PRESSURE: 124 MMHG | HEIGHT: 66 IN | DIASTOLIC BLOOD PRESSURE: 68 MMHG

## 2018-01-15 DIAGNOSIS — R21 RASH: Primary | ICD-10-CM

## 2018-01-15 PROCEDURE — 99214 OFFICE O/P EST MOD 30 MIN: CPT | Performed by: FAMILY MEDICINE

## 2018-01-15 RX ORDER — METHYLPREDNISOLONE 4 MG/1
TABLET ORAL
Qty: 1 KIT | Refills: 0 | Status: SHIPPED | OUTPATIENT
Start: 2018-01-15 | End: 2018-01-22 | Stop reason: ALTCHOICE

## 2018-01-15 NOTE — PROGRESS NOTES
Gamaliel Dan is a 80year old male here for Patient presents with:  Rash: Rm 3: Both legs and his torso, very itchy x4 days. Denies any pain.  cw      HPI:     Rash  -started 4-5 days ago  -associated with significant itching  -started in both legs a Comment: right eye  4/2014: CATARACT SURGERY, COMPLEX      Comment: left eye  No date: COLECTOMY  2010: COLON SURGERY      Comment: Colon Cancer  9-: COLONOSCOPY      Comment: Dr. Elmira Urrutia [facility] [place]  No date: HERNIA SURGERY  4-14-15: SKIN SURGERY L chlorthalidone 25 MG Oral Tab Take 1 tablet (25 mg total) by mouth every morning. Disp: 30 tablet Rfl: 2   AmLODIPine Besylate 10 MG Oral Tab Take 1 tablet (10 mg total) by mouth daily.  Disp: 90 tablet Rfl: 1   simvastatin 20 MG Oral Tab Take 1 tablet (2

## 2018-01-16 ENCOUNTER — TELEPHONE (OUTPATIENT)
Dept: SURGERY | Facility: CLINIC | Age: 83
End: 2018-01-16

## 2018-01-16 DIAGNOSIS — K43.9 VENTRAL HERNIA WITHOUT OBSTRUCTION OR GANGRENE: Primary | ICD-10-CM

## 2018-01-22 ENCOUNTER — OFFICE VISIT (OUTPATIENT)
Dept: FAMILY MEDICINE CLINIC | Facility: CLINIC | Age: 83
End: 2018-01-22

## 2018-01-22 VITALS
WEIGHT: 204 LBS | BODY MASS INDEX: 32.78 KG/M2 | HEART RATE: 80 BPM | HEIGHT: 66 IN | DIASTOLIC BLOOD PRESSURE: 66 MMHG | SYSTOLIC BLOOD PRESSURE: 120 MMHG

## 2018-01-22 DIAGNOSIS — B35.1 ONYCHOMYCOSIS: Primary | ICD-10-CM

## 2018-01-22 DIAGNOSIS — R21 RASH: ICD-10-CM

## 2018-01-22 DIAGNOSIS — E11.59 TYPE 2 DIABETES MELLITUS WITH OTHER CIRCULATORY COMPLICATION, WITHOUT LONG-TERM CURRENT USE OF INSULIN (HCC): ICD-10-CM

## 2018-01-22 DIAGNOSIS — M79.89 FOOT SWELLING: ICD-10-CM

## 2018-01-22 PROCEDURE — 99214 OFFICE O/P EST MOD 30 MIN: CPT | Performed by: FAMILY MEDICINE

## 2018-01-22 NOTE — PATIENT INSTRUCTIONS
-- continue moisturizers  -- continue triamcinolone as needed  -- consider OTC sarna lotion to help with itching  -- followup with PCP in 1 month to discuss hernia  -- call to schedule appt with podiatry

## 2018-01-23 NOTE — PROGRESS NOTES
Loni Chaparro is a 80year old male here for Patient presents with: Follow - Up: The rash is still on the patient's back, and there is redness on his legs  Swelling: bilateral ankle swelling      HPI:     1. Onychomycosis/2.  Foot swelling  -wants to CATARACT SURGERY, COMPLEX      Comment: left eye  No date: COLECTOMY  2010: COLON SURGERY      Comment: Colon Cancer  9-: COLONOSCOPY      Comment: Dr. Evelyne Dietrich [facility] [place]  No date: HERNIA SURGERY  4-14-15: SKIN SURGERY Left      Comment: MMS done for simvastatin 20 MG Oral Tab Take 1 tablet (20 mg total) by mouth nightly.  Disp: 90 tablet Rfl: 1       Allergies:  No Known Allergies      ROS:     --GEN: Denies  --HEENT: Denies  --RESP: Denies  --CV: Denies  --GI: Denies  --: Denies  --MSK: Denies  --

## 2018-01-24 ENCOUNTER — TELEPHONE (OUTPATIENT)
Dept: FAMILY MEDICINE CLINIC | Facility: CLINIC | Age: 83
End: 2018-01-24

## 2018-01-24 NOTE — TELEPHONE ENCOUNTER
Lisa Angel was in the other day for a swollen foot. Ancelmo Vick is asking if her grandfather has DM II. She noticed the DX on his paperwork said DM II. She wasn't aware that he was diabetic?

## 2018-02-20 ENCOUNTER — OFFICE VISIT (OUTPATIENT)
Dept: FAMILY MEDICINE CLINIC | Facility: CLINIC | Age: 83
End: 2018-02-20

## 2018-02-20 VITALS
SYSTOLIC BLOOD PRESSURE: 124 MMHG | BODY MASS INDEX: 33.44 KG/M2 | HEIGHT: 65.75 IN | WEIGHT: 205.63 LBS | DIASTOLIC BLOOD PRESSURE: 62 MMHG | RESPIRATION RATE: 16 BRPM | HEART RATE: 76 BPM

## 2018-02-20 DIAGNOSIS — L29.9 PRURITUS: ICD-10-CM

## 2018-02-20 DIAGNOSIS — R21 RASH: Primary | ICD-10-CM

## 2018-02-20 DIAGNOSIS — Z85.828 HISTORY OF SKIN CANCER: ICD-10-CM

## 2018-02-20 DIAGNOSIS — K43.2 VENTRAL INCISIONAL HERNIA: ICD-10-CM

## 2018-02-20 PROCEDURE — 99214 OFFICE O/P EST MOD 30 MIN: CPT | Performed by: FAMILY MEDICINE

## 2018-02-20 NOTE — PATIENT INSTRUCTIONS
Apply CeraVe twice a day to your back, chest, and abdominal areas. Aplique CeraVe dos veces al día en espinoza [place] Bones y áreas abdominales.

## 2018-02-20 NOTE — PROGRESS NOTES
Jasmin Talbert is a 80year old male. HPI:     Patient is accompanied by his daughter-in-law who is pleasant and supportive. She helps with translation. Rash:  Rash of lower extremities is greatly improved. Rash of torso basically the same.   Rash sclerosis     Hypercholesterolemia     Enlarged prostate     History of hernia repair     History of skin cancer     Neoplasm of uncertain behavior of skin of abdomen     Alcoholism /alcohol abuse ([address]     Suture granuloma     History of malignant neoplas [place]  No date: HERNIA SURGERY  4-14-15: SKIN SURGERY Left      Comment: MMS done for SCC, well dif, inv to left                antihelix, AB  5/4/2015: SKIN SURGERY      Comment: MMS - BCC to the Left [place]   3/29/17: SKIN SURGERY      Comment: [address] [place] [address]- BCC-jacinda papular rash, occasional excoriation, no erythema, no increased warmth. Psychiatric: He has a normal mood and affect.  His behavior is normal.       ASSESSMENT AND PLAN:     Rash  (primary encounter diagnosis)  Ventral incisional hernia  Pruritus  History

## 2018-02-26 ENCOUNTER — TELEPHONE (OUTPATIENT)
Dept: FAMILY MEDICINE CLINIC | Facility: CLINIC | Age: 83
End: 2018-02-26

## 2018-02-26 NOTE — TELEPHONE ENCOUNTER
Good morning,       Per call rcvd from [facility] Dr. Cesar Howard is not an in-network doctor for this pts insurance. Please advise pt ref was not approved, pt will need to contact insurance to obtain a list of names that are in-network.        Thank you,

## 2018-02-28 NOTE — TELEPHONE ENCOUNTER
Spoke to Jeremías hinojosa and she will speak to her Mom regarding this and relay the info. They are asked to call office back once they have an in network MD for Derm so referral can be processed.

## 2018-03-02 ENCOUNTER — OFFICE VISIT (OUTPATIENT)
Dept: FAMILY MEDICINE CLINIC | Facility: CLINIC | Age: 83
End: 2018-03-02

## 2018-03-02 VITALS
HEIGHT: 65.75 IN | BODY MASS INDEX: 33.57 KG/M2 | RESPIRATION RATE: 16 BRPM | HEART RATE: 67 BPM | DIASTOLIC BLOOD PRESSURE: 61 MMHG | WEIGHT: 206.38 LBS | SYSTOLIC BLOOD PRESSURE: 146 MMHG

## 2018-03-02 DIAGNOSIS — I10 ESSENTIAL HYPERTENSION, BENIGN: Primary | ICD-10-CM

## 2018-03-02 DIAGNOSIS — K43.2 VENTRAL INCISIONAL HERNIA: ICD-10-CM

## 2018-03-02 DIAGNOSIS — E78.00 HYPERCHOLESTEROLEMIA: ICD-10-CM

## 2018-03-02 DIAGNOSIS — R21 RASH: ICD-10-CM

## 2018-03-02 DIAGNOSIS — Z01.818 PREOP TESTING: ICD-10-CM

## 2018-03-02 DIAGNOSIS — H10.9 CONJUNCTIVITIS OF BOTH EYES, UNSPECIFIED CONJUNCTIVITIS TYPE: ICD-10-CM

## 2018-03-02 DIAGNOSIS — R73.9 HYPERGLYCEMIA: ICD-10-CM

## 2018-03-02 PROCEDURE — 99214 OFFICE O/P EST MOD 30 MIN: CPT | Performed by: FAMILY MEDICINE

## 2018-03-02 PROCEDURE — 93000 ELECTROCARDIOGRAM COMPLETE: CPT | Performed by: FAMILY MEDICINE

## 2018-03-02 RX ORDER — GENTAMICIN SULFATE 3 MG/ML
1 SOLUTION/ DROPS OPHTHALMIC 3 TIMES DAILY
Qty: 5 ML | Refills: 0 | Status: ON HOLD | OUTPATIENT
Start: 2018-03-02 | End: 2018-03-21 | Stop reason: ALTCHOICE

## 2018-03-02 RX ORDER — CHLORTHALIDONE 25 MG/1
25 TABLET ORAL EVERY MORNING
Qty: 90 TABLET | Refills: 1 | Status: SHIPPED | OUTPATIENT
Start: 2018-03-02 | End: 2018-10-26

## 2018-03-02 RX ORDER — SIMVASTATIN 20 MG
20 TABLET ORAL NIGHTLY
Qty: 90 TABLET | Refills: 1 | Status: SHIPPED | OUTPATIENT
Start: 2018-03-02 | End: 2018-10-26

## 2018-03-02 RX ORDER — AMLODIPINE BESYLATE 10 MG/1
10 TABLET ORAL DAILY
Qty: 90 TABLET | Refills: 1 | Status: SHIPPED | OUTPATIENT
Start: 2018-03-02 | End: 2018-10-26

## 2018-03-02 RX ORDER — TRAMADOL HYDROCHLORIDE 50 MG/1
50 TABLET ORAL 2 TIMES DAILY PRN
Qty: 60 TABLET | Refills: 0 | Status: CANCELLED | OUTPATIENT
Start: 2018-03-02

## 2018-03-02 NOTE — PROGRESS NOTES
Herbie Matos is a 80year old male. HPI:     Patient is accompanied by his daughter-in-law who is pleasant and supportive. HTN:    Uncontrolled. Patient has been out of medications for a few days.   Severity is mild, patient is on 2 agents fo Aortic sclerosis 2/13/2015   • Atrophic gastritis 9-    chronic, inactive   • BPH (benign prostatic hyperplasia)    • Cancer [facility])     skin cancer ? kind   • Cataract    • Colon cancer [facility]) 2010   • Colon polyps 9-   • EKG, abnormal 2/13/201 Alcohol use: Yes           0.0 oz/week     Comment: Only drink beer when in [place] 4-6 beers               daily        Family History   Problem Relation Age of Onset   • Cancer Neg    • Heart Disease Neg    • Stroke Neg      REVIEW OF SYSTEMS:   GENERAL Cholesterol Calc      <130 mg/dL 127 133 (H) 121 76   VLDL      5 - 40 mg/dL 31 21 21 20   T.  CHOL/HDL RATIO      <4.97 4.76 3.85 3.06 2.81   NON HDL CHOL      <130 mg/dL 158 (H) 154 (H) 142 (H) 96         ASSESSMENT AND PLAN:       Essential hypertension, Kathleen.   - ELECTROCARDIOGRAM, COMPLETE            Orders Placed This Encounter      CBC W/DIFF      COMP METABOLIC PANEL      LIPID PANEL      TSH      T4 FREE      Hemoglobin A1C [E]    Meds & Refills for this Visit:  Signed Prescriptions Disp Refills

## 2018-03-05 ENCOUNTER — TELEPHONE (OUTPATIENT)
Dept: SURGERY | Facility: CLINIC | Age: 83
End: 2018-03-05

## 2018-03-05 ENCOUNTER — LAB ENCOUNTER (OUTPATIENT)
Dept: LAB | Age: 83
End: 2018-03-05
Attending: FAMILY MEDICINE
Payer: MEDICARE

## 2018-03-05 DIAGNOSIS — K43.9 VENTRAL HERNIA WITHOUT OBSTRUCTION OR GANGRENE: Primary | ICD-10-CM

## 2018-03-05 DIAGNOSIS — I10 ESSENTIAL HYPERTENSION, BENIGN: ICD-10-CM

## 2018-03-05 DIAGNOSIS — R73.9 HYPERGLYCEMIA: ICD-10-CM

## 2018-03-05 DIAGNOSIS — E78.00 HYPERCHOLESTEROLEMIA: ICD-10-CM

## 2018-03-05 LAB
ALBUMIN SERPL-MCNC: 3.7 G/DL (ref 3.5–4.8)
ALP LIVER SERPL-CCNC: 71 U/L (ref 45–117)
ALT SERPL-CCNC: 16 U/L (ref 17–63)
AST SERPL-CCNC: 18 U/L (ref 15–41)
BASOPHILS # BLD AUTO: 0.06 X10(3) UL (ref 0–0.1)
BASOPHILS NFR BLD AUTO: 0.9 %
BILIRUB SERPL-MCNC: 0.6 MG/DL (ref 0.1–2)
BUN BLD-MCNC: 15 MG/DL (ref 8–20)
CALCIUM BLD-MCNC: 9.6 MG/DL (ref 8.3–10.3)
CHLORIDE: 102 MMOL/L (ref 101–111)
CHOLEST SMN-MCNC: 200 MG/DL (ref ?–200)
CO2: 28 MMOL/L (ref 22–32)
CREAT BLD-MCNC: 0.8 MG/DL (ref 0.7–1.3)
EOSINOPHIL # BLD AUTO: 0.12 X10(3) UL (ref 0–0.3)
EOSINOPHIL NFR BLD AUTO: 1.9 %
ERYTHROCYTE [DISTWIDTH] IN BLOOD BY AUTOMATED COUNT: 12.8 % (ref 11.5–16)
EST. AVERAGE GLUCOSE BLD GHB EST-MCNC: 137 MG/DL (ref 68–126)
FREE T4: 1 NG/DL (ref 0.9–1.8)
GLUCOSE BLD-MCNC: 110 MG/DL (ref 70–99)
HBA1C MFR BLD HPLC: 6.4 % (ref ?–5.7)
HCT VFR BLD AUTO: 47.5 % (ref 37–53)
HDLC SERPL-MCNC: 42 MG/DL (ref 45–?)
HDLC SERPL: 4.76 {RATIO} (ref ?–4.97)
HGB BLD-MCNC: 16.1 G/DL (ref 13–17)
IMMATURE GRANULOCYTE COUNT: 0.02 X10(3) UL (ref 0–1)
IMMATURE GRANULOCYTE RATIO %: 0.3 %
LDLC SERPL CALC-MCNC: 127 MG/DL (ref ?–130)
LYMPHOCYTES # BLD AUTO: 2.08 X10(3) UL (ref 0.9–4)
LYMPHOCYTES NFR BLD AUTO: 32.6 %
M PROTEIN MFR SERPL ELPH: 7.5 G/DL (ref 6.1–8.3)
MCH RBC QN AUTO: 30.3 PG (ref 27–33.2)
MCHC RBC AUTO-ENTMCNC: 33.9 G/DL (ref 31–37)
MCV RBC AUTO: 89.3 FL (ref 80–99)
MONOCYTES # BLD AUTO: 0.53 X10(3) UL (ref 0.1–1)
MONOCYTES NFR BLD AUTO: 8.3 %
NEUTROPHIL ABS PRELIM: 3.57 X10 (3) UL (ref 1.3–6.7)
NEUTROPHILS # BLD AUTO: 3.57 X10(3) UL (ref 1.3–6.7)
NEUTROPHILS NFR BLD AUTO: 56 %
NONHDLC SERPL-MCNC: 158 MG/DL (ref ?–130)
PLATELET # BLD AUTO: 226 10(3)UL (ref 150–450)
POTASSIUM SERPL-SCNC: 3.7 MMOL/L (ref 3.6–5.1)
RBC # BLD AUTO: 5.32 X10(6)UL (ref 3.8–5.8)
RED CELL DISTRIBUTION WIDTH-SD: 42.3 FL (ref 35.1–46.3)
SODIUM SERPL-SCNC: 137 MMOL/L (ref 136–144)
TRIGL SERPL-MCNC: 154 MG/DL (ref ?–150)
TSI SER-ACNC: 2.56 MIU/ML (ref 0.35–5.5)
VLDLC SERPL CALC-MCNC: 31 MG/DL (ref 5–40)
WBC # BLD AUTO: 6.4 X10(3) UL (ref 4–13)

## 2018-03-05 PROCEDURE — 83036 HEMOGLOBIN GLYCOSYLATED A1C: CPT | Performed by: FAMILY MEDICINE

## 2018-03-05 PROCEDURE — 84439 ASSAY OF FREE THYROXINE: CPT | Performed by: FAMILY MEDICINE

## 2018-03-05 PROCEDURE — 80050 GENERAL HEALTH PANEL: CPT | Performed by: FAMILY MEDICINE

## 2018-03-05 PROCEDURE — 36415 COLL VENOUS BLD VENIPUNCTURE: CPT | Performed by: FAMILY MEDICINE

## 2018-03-05 PROCEDURE — 80061 LIPID PANEL: CPT | Performed by: FAMILY MEDICINE

## 2018-03-05 RX ORDER — TRIAMCINOLONE ACETONIDE 1 MG/G
CREAM TOPICAL
Refills: 0 | COMMUNITY
Start: 2018-03-02 | End: 2018-03-05

## 2018-03-05 RX ORDER — POTASSIUM CHLORIDE 750 MG/1
TABLET, FILM COATED, EXTENDED RELEASE ORAL
Qty: 14 TABLET | Refills: 0 | OUTPATIENT
Start: 2018-03-05

## 2018-03-05 NOTE — TELEPHONE ENCOUNTER
Dr Erik Martinez advise refill of potassium  This was last prescribed on 9/26/17 for qty 14 NR  It does not appear that you have prescribed this in the past  Patient completed CMP today- K+  3.7

## 2018-03-05 NOTE — TELEPHONE ENCOUNTER
Refill not needed on either medication, I declined the refill for the potassium and I discontinued the topical steroid. When I discontinued the topical steroid, the system should notify the pharmacy electronically that it was discontinued.

## 2018-03-06 NOTE — TELEPHONE ENCOUNTER
Dr Trey Mitchell, the triamcinolone cream was not ordered for the patient, it was added as an historical medication to patient's med list

## 2018-03-07 ENCOUNTER — ANESTHESIA EVENT (OUTPATIENT)
Dept: SURGERY | Facility: HOSPITAL | Age: 83
DRG: 330 | End: 2018-03-07
Payer: MEDICARE

## 2018-03-07 NOTE — TELEPHONE ENCOUNTER
I left a message for Rashida Vega, the patient's daughter, to call me back at her earliest convenience.

## 2018-03-07 NOTE — TELEPHONE ENCOUNTER
Has pt been taking potassium supplement? When did he last take it?   Below is what is in Epic as last time it was prescribed:    Potassium Chloride ER 10 MEQ Oral Tab CR 14 tablet 0 9/26/2017

## 2018-03-09 ENCOUNTER — TELEPHONE (OUTPATIENT)
Dept: FAMILY MEDICINE CLINIC | Facility: CLINIC | Age: 83
End: 2018-03-09

## 2018-03-09 NOTE — TELEPHONE ENCOUNTER
----- Message from Kendrick Zheng DO sent at 3/8/2018  8:08 PM CST -----  Please call patient: (Please ask Dasha Jimenez if she would mind making the call as patient is [place]ese-speaking) prediabetes is worsening.   Please make an appointment to see me to alicia

## 2018-03-09 NOTE — TELEPHONE ENCOUNTER
Per Dr. Jacqui Das, the patient's daughter, Maria A Clark, was informed that the patient's prediabetes is worsening. An appointment was scheduled for the patient to follow up on 03/29/2018.

## 2018-03-12 ENCOUNTER — OFFICE VISIT (OUTPATIENT)
Dept: FAMILY MEDICINE CLINIC | Facility: CLINIC | Age: 83
End: 2018-03-12

## 2018-03-12 VITALS
SYSTOLIC BLOOD PRESSURE: 136 MMHG | WEIGHT: 205.81 LBS | RESPIRATION RATE: 16 BRPM | BODY MASS INDEX: 33.47 KG/M2 | HEIGHT: 65.75 IN | DIASTOLIC BLOOD PRESSURE: 64 MMHG | HEART RATE: 72 BPM

## 2018-03-12 DIAGNOSIS — H10.9 CONJUNCTIVITIS OF BOTH EYES, UNSPECIFIED CONJUNCTIVITIS TYPE: Primary | ICD-10-CM

## 2018-03-12 PROCEDURE — 99213 OFFICE O/P EST LOW 20 MIN: CPT | Performed by: FAMILY MEDICINE

## 2018-03-12 NOTE — PROGRESS NOTES
Hebert Abreu is a 80year old male. HPI:     Pt was seen 10 days ago for eye complaint. He did use the eye drops as directed. Worsening, more red, itchy. No pain. Had swelling of the left upper eyelid for the last week.   Swelling goes down durin difficulty awakening after a surgery many years ago- needed defibrillator shock to come out per daughter,   • Aortic sclerosis 2/13/2015   • Atrophic gastritis 9-    chronic, inactive   • BPH (benign prostatic hyperplasia)    • [facility]     skin tobacco: Never Used                      Alcohol use: Yes           0.6 oz/week     Cans of beer: 1 per week     Comment: DRINKS 5-6 WHEN IN [place]           REVIEW OF SYSTEMS:   Review of Systems   Constitutional: Negative.   Negative for chills, fatigue a

## 2018-03-13 RX ORDER — POTASSIUM CHLORIDE 750 MG/1
20 TABLET, FILM COATED, EXTENDED RELEASE ORAL 2 TIMES DAILY
Qty: 120 TABLET | Refills: 0 | OUTPATIENT
Start: 2018-03-13

## 2018-03-15 ENCOUNTER — TELEPHONE (OUTPATIENT)
Dept: FAMILY MEDICINE CLINIC | Facility: CLINIC | Age: 83
End: 2018-03-15

## 2018-03-15 DIAGNOSIS — H10.9 CONJUNCTIVITIS OF BOTH EYES, UNSPECIFIED CONJUNCTIVITIS TYPE: Primary | ICD-10-CM

## 2018-03-15 NOTE — TELEPHONE ENCOUNTER
Pt's daughter came in with different name of optho that pt can see who is in network  Dr. Sana Simmons. Referral placed in system and given to pt's daugther.   appt is tomorrow

## 2018-03-20 NOTE — H&P
Doing well. Here to discuss hernia surgery. No pain at the site. Eating well. Normal BMs.   Pt s/p exploratory laparotomy x2 in 2012.        [address] [place] [address] has No Known Allergies.     Past Medical / Surgical / Social / Family History    The past med 9-: COLONOSCOPY      Comment: Dr. [place], [place]  No date: HERNIA SURGERY  4-14-15: SKIN SURGERY Left      Comment: MMS done for [facility], well dif, inv to left                antihelix, AB  5/4/2015: SKIN SURGERY      Comment: MMS - BCC to the Left T AmLODIPine Besylate 10 MG Oral Tab Take 1 tablet (10 mg total) by mouth daily. Disp: 90 tablet Rfl: 1   simvastatin 20 MG Oral Tab Take 1 tablet (20 mg total) by mouth nightly.  Disp: 90 tablet Rfl: 1   triamcinolone acetonide 0.1 % External Cream Apply top 3 cm x 3 cm palpable fascial defect at the superior aspect of the incision with a reducible ventral hernia.  Non-tender.      Assessment   Ventral incisional hernia  (primary encounter diagnosis)  Parkinson's disease (HCC)  Suture granuloma, subsequent enc

## 2018-03-21 ENCOUNTER — HOSPITAL ENCOUNTER (INPATIENT)
Facility: HOSPITAL | Age: 83
LOS: 1 days | Discharge: HOME | DRG: 330 | End: 2018-03-22
Attending: SURGERY | Admitting: SURGERY
Payer: MEDICARE

## 2018-03-21 ENCOUNTER — ANESTHESIA (OUTPATIENT)
Dept: SURGERY | Facility: HOSPITAL | Age: 83
DRG: 330 | End: 2018-03-21
Payer: MEDICARE

## 2018-03-21 ENCOUNTER — SURGERY (OUTPATIENT)
Age: 83
End: 2018-03-21

## 2018-03-21 DIAGNOSIS — K43.9 VENTRAL HERNIA WITHOUT OBSTRUCTION OR GANGRENE: ICD-10-CM

## 2018-03-21 LAB — GLUCOSE BLD-MCNC: 145 MG/DL (ref 65–99)

## 2018-03-21 PROCEDURE — 99223 1ST HOSP IP/OBS HIGH 75: CPT | Performed by: HOSPITALIST

## 2018-03-21 PROCEDURE — 0WUF0JZ SUPPLEMENT ABDOMINAL WALL WITH SYNTHETIC SUBSTITUTE, OPEN APPROACH: ICD-10-PCS | Performed by: SURGERY

## 2018-03-21 PROCEDURE — 0DB80ZZ EXCISION OF SMALL INTESTINE, OPEN APPROACH: ICD-10-PCS | Performed by: SURGERY

## 2018-03-21 RX ORDER — ONDANSETRON 2 MG/ML
4 INJECTION INTRAMUSCULAR; INTRAVENOUS EVERY 6 HOURS PRN
Status: DISCONTINUED | OUTPATIENT
Start: 2018-03-21 | End: 2018-03-22

## 2018-03-21 RX ORDER — HYDROCODONE BITARTRATE AND ACETAMINOPHEN 5; 325 MG/1; MG/1
2 TABLET ORAL EVERY 4 HOURS PRN
Status: DISCONTINUED | OUTPATIENT
Start: 2018-03-21 | End: 2018-03-22

## 2018-03-21 RX ORDER — HYDROCODONE BITARTRATE AND ACETAMINOPHEN 5; 325 MG/1; MG/1
1 TABLET ORAL EVERY 4 HOURS PRN
Status: DISCONTINUED | OUTPATIENT
Start: 2018-03-21 | End: 2018-03-22

## 2018-03-21 RX ORDER — BUPIVACAINE HYDROCHLORIDE AND EPINEPHRINE 5; 5 MG/ML; UG/ML
INJECTION, SOLUTION EPIDURAL; INTRACAUDAL; PERINEURAL AS NEEDED
Status: DISCONTINUED | OUTPATIENT
Start: 2018-03-21 | End: 2018-03-21 | Stop reason: HOSPADM

## 2018-03-21 RX ORDER — GENTAMICIN SULFATE 3 MG/ML
1 SOLUTION/ DROPS OPHTHALMIC 3 TIMES DAILY
Status: DISCONTINUED | OUTPATIENT
Start: 2018-03-21 | End: 2018-03-21

## 2018-03-21 RX ORDER — INSULIN ASPART 100 [IU]/ML
INJECTION, SOLUTION INTRAVENOUS; SUBCUTANEOUS
Status: COMPLETED
Start: 2018-03-21 | End: 2018-03-21

## 2018-03-21 RX ORDER — AMLODIPINE BESYLATE 5 MG/1
10 TABLET ORAL DAILY
Status: DISCONTINUED | OUTPATIENT
Start: 2018-03-21 | End: 2018-03-22

## 2018-03-21 RX ORDER — HYDROCODONE BITARTRATE AND ACETAMINOPHEN 5; 325 MG/1; MG/1
2 TABLET ORAL AS NEEDED
Status: DISCONTINUED | OUTPATIENT
Start: 2018-03-21 | End: 2018-03-21 | Stop reason: HOSPADM

## 2018-03-21 RX ORDER — MORPHINE SULFATE 2 MG/ML
2 INJECTION, SOLUTION INTRAMUSCULAR; INTRAVENOUS EVERY 5 MIN PRN
Status: DISCONTINUED | OUTPATIENT
Start: 2018-03-21 | End: 2018-03-21 | Stop reason: HOSPADM

## 2018-03-21 RX ORDER — HEPARIN SODIUM 5000 [USP'U]/ML
5000 INJECTION, SOLUTION INTRAVENOUS; SUBCUTANEOUS ONCE
Status: COMPLETED | OUTPATIENT
Start: 2018-03-21 | End: 2018-03-21

## 2018-03-21 RX ORDER — ONDANSETRON 2 MG/ML
INJECTION INTRAMUSCULAR; INTRAVENOUS
Status: DISPENSED
Start: 2018-03-21 | End: 2018-03-21

## 2018-03-21 RX ORDER — METOCLOPRAMIDE HYDROCHLORIDE 5 MG/ML
10 INJECTION INTRAMUSCULAR; INTRAVENOUS EVERY 6 HOURS PRN
Status: DISCONTINUED | OUTPATIENT
Start: 2018-03-21 | End: 2018-03-22

## 2018-03-21 RX ORDER — BACITRACIN 50000 [USP'U]/1
INJECTION, POWDER, LYOPHILIZED, FOR SOLUTION INTRAMUSCULAR AS NEEDED
Status: DISCONTINUED | OUTPATIENT
Start: 2018-03-21 | End: 2018-03-21 | Stop reason: HOSPADM

## 2018-03-21 RX ORDER — KETOROLAC TROMETHAMINE 30 MG/ML
30 INJECTION, SOLUTION INTRAMUSCULAR; INTRAVENOUS EVERY 6 HOURS PRN
Status: DISCONTINUED | OUTPATIENT
Start: 2018-03-21 | End: 2018-03-21

## 2018-03-21 RX ORDER — ATORVASTATIN CALCIUM 10 MG/1
10 TABLET, FILM COATED ORAL NIGHTLY
Status: DISCONTINUED | OUTPATIENT
Start: 2018-03-21 | End: 2018-03-22

## 2018-03-21 RX ORDER — SODIUM CHLORIDE, SODIUM LACTATE, POTASSIUM CHLORIDE, CALCIUM CHLORIDE 600; 310; 30; 20 MG/100ML; MG/100ML; MG/100ML; MG/100ML
INJECTION, SOLUTION INTRAVENOUS CONTINUOUS
Status: DISCONTINUED | OUTPATIENT
Start: 2018-03-21 | End: 2018-03-21 | Stop reason: HOSPADM

## 2018-03-21 RX ORDER — DEXTROSE AND SODIUM CHLORIDE 5; .9 G/100ML; G/100ML
INJECTION, SOLUTION INTRAVENOUS CONTINUOUS
Status: DISCONTINUED | OUTPATIENT
Start: 2018-03-21 | End: 2018-03-22

## 2018-03-21 RX ORDER — MORPHINE SULFATE 4 MG/ML
4 INJECTION, SOLUTION INTRAMUSCULAR; INTRAVENOUS EVERY 2 HOUR PRN
Status: DISCONTINUED | OUTPATIENT
Start: 2018-03-21 | End: 2018-03-22

## 2018-03-21 RX ORDER — SODIUM CHLORIDE, SODIUM LACTATE, POTASSIUM CHLORIDE, CALCIUM CHLORIDE 600; 310; 30; 20 MG/100ML; MG/100ML; MG/100ML; MG/100ML
INJECTION, SOLUTION INTRAVENOUS CONTINUOUS
Status: DISCONTINUED | OUTPATIENT
Start: 2018-03-21 | End: 2018-03-22

## 2018-03-21 RX ORDER — INSULIN ASPART 100 [IU]/ML
INJECTION, SOLUTION INTRAVENOUS; SUBCUTANEOUS ONCE
Status: COMPLETED | OUTPATIENT
Start: 2018-03-21 | End: 2018-03-21

## 2018-03-21 RX ORDER — NALOXONE HYDROCHLORIDE 0.4 MG/ML
80 INJECTION, SOLUTION INTRAMUSCULAR; INTRAVENOUS; SUBCUTANEOUS AS NEEDED
Status: DISCONTINUED | OUTPATIENT
Start: 2018-03-21 | End: 2018-03-21 | Stop reason: HOSPADM

## 2018-03-21 RX ORDER — KETOROLAC TROMETHAMINE 15 MG/ML
15 INJECTION, SOLUTION INTRAMUSCULAR; INTRAVENOUS EVERY 6 HOURS PRN
Status: DISCONTINUED | OUTPATIENT
Start: 2018-03-21 | End: 2018-03-22

## 2018-03-21 RX ORDER — MORPHINE SULFATE 4 MG/ML
INJECTION, SOLUTION INTRAMUSCULAR; INTRAVENOUS
Status: DISPENSED
Start: 2018-03-21 | End: 2018-03-21

## 2018-03-21 RX ORDER — HEPARIN SODIUM 5000 [USP'U]/ML
5000 INJECTION, SOLUTION INTRAVENOUS; SUBCUTANEOUS EVERY 12 HOURS SCHEDULED
Status: DISCONTINUED | OUTPATIENT
Start: 2018-03-21 | End: 2018-03-22

## 2018-03-21 RX ORDER — MORPHINE SULFATE 4 MG/ML
2 INJECTION, SOLUTION INTRAMUSCULAR; INTRAVENOUS EVERY 2 HOUR PRN
Status: DISCONTINUED | OUTPATIENT
Start: 2018-03-21 | End: 2018-03-22

## 2018-03-21 RX ORDER — LABETALOL HYDROCHLORIDE 5 MG/ML
5 INJECTION, SOLUTION INTRAVENOUS EVERY 5 MIN PRN
Status: DISCONTINUED | OUTPATIENT
Start: 2018-03-21 | End: 2018-03-21 | Stop reason: HOSPADM

## 2018-03-21 RX ORDER — IBUPROFEN 600 MG/1
600 TABLET ORAL ONCE AS NEEDED
Status: DISCONTINUED | OUTPATIENT
Start: 2018-03-21 | End: 2018-03-21 | Stop reason: HOSPADM

## 2018-03-21 RX ORDER — HYDROCODONE BITARTRATE AND ACETAMINOPHEN 5; 325 MG/1; MG/1
1 TABLET ORAL AS NEEDED
Status: DISCONTINUED | OUTPATIENT
Start: 2018-03-21 | End: 2018-03-21 | Stop reason: HOSPADM

## 2018-03-21 RX ORDER — CEFAZOLIN SODIUM/WATER 2 G/20 ML
2 SYRINGE (ML) INTRAVENOUS ONCE
Status: COMPLETED | OUTPATIENT
Start: 2018-03-21 | End: 2018-03-21

## 2018-03-21 RX ORDER — MORPHINE SULFATE 4 MG/ML
8 INJECTION, SOLUTION INTRAMUSCULAR; INTRAVENOUS EVERY 2 HOUR PRN
Status: DISCONTINUED | OUTPATIENT
Start: 2018-03-21 | End: 2018-03-22

## 2018-03-21 NOTE — CONSULTS
[facility]  [address] [facility] Patient Status:  Outpatient in a Bed    1935 MRN [id number]   [facility] 3NW-A Attending Emanuel Steele MD   Hosp Day # 0 PCP Catina Becker DO     Reason for consult: Medic left    • Tubular adenoma 9-        Past Surgical History: Past Surgical History:  No date: APPENDECTOMY  No date: ARTHROSCOPY OF JOINT UNLISTED Bilateral      Comment: SURGERY ON BOTH SHOULDERS - UNSURE IF SCOPE OR               OPEN  No date: TODD Take 2 tablets (20 mEq total) by mouth 2 (two) times daily. Disp: 14 tablet Rfl: 0       Review of Systems:   A comprehensive 14 point review of systems was completed. Pertinent positives and negatives noted in the HPI.     Physical Exam:    /59 (B MD  0/99/6652    **Certification      PHYSICIAN Certification of Need for Inpatient Hospitalization - Initial Certification    Patient will require inpatient services that will reasonably be expected to span two midnight's based on the clinical documentati

## 2018-03-21 NOTE — ANESTHESIA PREPROCEDURE EVALUATION
PRE-OP EVALUATION    Patient Name: Loni Chaparro    Pre-op Diagnosis: Ventral hernia without obstruction or gangrene [K43.9]    Procedure(s):  VENTRAL HERNIA REPAIR WITH MESH    Surgeon(s) and Role:     Arline Early MD - Primary    Pre-op vitals COLECTOMY  9-: COLONOSCOPY      Comment: Dr. Tayler Moscos[place], [place]  No date: HERNIA SURGERY  4-14-15: SKIN SURGERY Left      Comment: MMS done for [facility], well dif, inv to left                antihelix, AB  5/4/2015: SKIN SURGERY      Comment: MMS - BCC to

## 2018-03-21 NOTE — OPERATIVE REPORT
[facility]    PATIENT'S NAME: Bianca Maloney   ATTENDING PHYSICIAN: Slim Rashid M.D. OPERATING PHYSICIAN: Slim Rashid M.D.    PATIENT ACCOUNT#:   [de-identified]    LOCATION:  PACU 1404 [facility] PACU 6 EDWP 10  MEDICAL RECORD #:   [id number]       MAYELA aspect of the fascial defect, it appeared that the patient had prior mesh placed. Small bowel was adhesed to the mesh and this was taken down using sharp scissors dissection.   At this point, the fascial edges were cleared of surrounding tissue allowing fo

## 2018-03-21 NOTE — BRIEF OP NOTE
Pre-Op Diagnosis Codes: * Ventral hernia without obstruction or gangrene [K43.9]     Post-Op Diagnosis Codes:      * Ventral hernia without obstruction or gangrene [K43.9]     Procedure Performed:   Procedure(s):  VENTRAL HERNIA REPAIR WITH MESH, sma

## 2018-03-21 NOTE — CONSULTS
[facility] Pharmacy Note:  Age Based Dose Adjustment    Nicolle Hilario has been prescribed ketorolac (TORADOL) 30 mg IV every 6 hours. Patient is >71 years old therefore the dose has been adjusted to 15 mg IV every 6 hours.       Thank you,  Fannie Richardson,

## 2018-03-21 NOTE — CONSULTS
[facility] Pharmacy Note:  Renal Adjustment for piperacillin/tazobactam (Fallon Sood)    Valdez Thomas is a 80year old male who has been prescribed piperacillin/tazobactam (ZOSYN) 3750 mg every 6 hrs.   CrCl is CrCl cannot be calculated (Patient's most recen

## 2018-03-21 NOTE — PROGRESS NOTES
The patient is in stable condition post op, his vital signs are stable, and he states that his pain level is down to 2 out of 10 with Morphine and Toradol administration. He is tolerating clear liquids with no complaint of nausea.

## 2018-03-21 NOTE — ANESTHESIA POSTPROCEDURE EVALUATION
[address] [facility] Patient Status:  Hospital Outpatient Surgery   Age/Gender 80year old male MRN [id number]   Location [address] Attending Elijah Solares MD   Hosp Day # 0 PCP Dana Zamudio DO

## 2018-03-22 VITALS
DIASTOLIC BLOOD PRESSURE: 68 MMHG | HEIGHT: 65.75 IN | TEMPERATURE: 98 F | RESPIRATION RATE: 18 BRPM | OXYGEN SATURATION: 94 % | HEART RATE: 59 BPM | BODY MASS INDEX: 33.31 KG/M2 | SYSTOLIC BLOOD PRESSURE: 149 MMHG | WEIGHT: 204.81 LBS

## 2018-03-22 LAB
ALBUMIN SERPL-MCNC: 2.8 G/DL (ref 3.5–4.8)
ALP LIVER SERPL-CCNC: 54 U/L (ref 45–117)
ALT SERPL-CCNC: <6 U/L (ref 17–63)
AST SERPL-CCNC: 13 U/L (ref 15–41)
BASOPHILS # BLD AUTO: 0 X10(3) UL (ref 0–0.1)
BASOPHILS NFR BLD AUTO: 0 %
BILIRUB SERPL-MCNC: 0.6 MG/DL (ref 0.1–2)
BUN BLD-MCNC: 13 MG/DL (ref 8–20)
CALCIUM BLD-MCNC: 8.7 MG/DL (ref 8.3–10.3)
CHLORIDE: 100 MMOL/L (ref 101–111)
CO2: 28 MMOL/L (ref 22–32)
CREAT BLD-MCNC: 0.88 MG/DL (ref 0.7–1.3)
EOSINOPHIL # BLD AUTO: 0 X10(3) UL (ref 0–0.3)
EOSINOPHIL NFR BLD AUTO: 0 %
ERYTHROCYTE [DISTWIDTH] IN BLOOD BY AUTOMATED COUNT: 12.7 % (ref 11.5–16)
GLUCOSE BLD-MCNC: 164 MG/DL (ref 70–99)
HCT VFR BLD AUTO: 40.2 % (ref 37–53)
HGB BLD-MCNC: 14.1 G/DL (ref 13–17)
IMMATURE GRANULOCYTE COUNT: 0.04 X10(3) UL (ref 0–1)
IMMATURE GRANULOCYTE RATIO %: 0.3 %
LYMPHOCYTES # BLD AUTO: 1.36 X10(3) UL (ref 0.9–4)
LYMPHOCYTES NFR BLD AUTO: 11.3 %
M PROTEIN MFR SERPL ELPH: 6.7 G/DL (ref 6.1–8.3)
MCH RBC QN AUTO: 31.1 PG (ref 27–33.2)
MCHC RBC AUTO-ENTMCNC: 35.1 G/DL (ref 31–37)
MCV RBC AUTO: 88.5 FL (ref 80–99)
MONOCYTES # BLD AUTO: 0.76 X10(3) UL (ref 0.1–1)
MONOCYTES NFR BLD AUTO: 6.3 %
NEUTROPHIL ABS PRELIM: 9.84 X10 (3) UL (ref 1.3–6.7)
NEUTROPHILS # BLD AUTO: 9.84 X10(3) UL (ref 1.3–6.7)
NEUTROPHILS NFR BLD AUTO: 82.1 %
PLATELET # BLD AUTO: 212 10(3)UL (ref 150–450)
POTASSIUM SERPL-SCNC: 2.9 MMOL/L (ref 3.6–5.1)
RBC # BLD AUTO: 4.54 X10(6)UL (ref 3.8–5.8)
RED CELL DISTRIBUTION WIDTH-SD: 41.5 FL (ref 35.1–46.3)
SODIUM SERPL-SCNC: 139 MMOL/L (ref 136–144)
WBC # BLD AUTO: 12 X10(3) UL (ref 4–13)

## 2018-03-22 PROCEDURE — 99232 SBSQ HOSP IP/OBS MODERATE 35: CPT | Performed by: HOSPITALIST

## 2018-03-22 RX ORDER — AMOXICILLIN AND CLAVULANATE POTASSIUM 875; 125 MG/1; MG/1
1 TABLET, FILM COATED ORAL 2 TIMES DAILY
Qty: 20 TABLET | Refills: 0 | Status: ON HOLD | OUTPATIENT
Start: 2018-03-22 | End: 2018-04-07

## 2018-03-22 RX ORDER — HYDROCODONE BITARTRATE AND ACETAMINOPHEN 5; 325 MG/1; MG/1
1-2 TABLET ORAL EVERY 4 HOURS PRN
Qty: 20 TABLET | Refills: 0 | Status: ON HOLD | OUTPATIENT
Start: 2018-03-22 | End: 2018-04-07

## 2018-03-22 RX ORDER — POTASSIUM CHLORIDE 20 MEQ/1
40 TABLET, EXTENDED RELEASE ORAL EVERY 4 HOURS
Status: COMPLETED | OUTPATIENT
Start: 2018-03-22 | End: 2018-03-22

## 2018-03-22 NOTE — PAYOR COMM NOTE
--------------  ADMISSION REVIEW         3/21            PREOPERATIVE DIAGNOSIS:  Ventral incisional hernia. POSTOPERATIVE DIAGNOSIS:  Ventral incisional hernia (10 x 5 cm). PROCEDURE PERFORMED:    1.        Ventral incisional hernia repair with XenMatrix

## 2018-03-22 NOTE — PROGRESS NOTES
[facility]  Progress Note     [facility] Patient Status:  Inpatient    1935 MRN [id number]   [facility] 3NW-A Attending Kaleigh Vaughn MD   Hosp Day # 1 PCP Demetra Gómez DO     Chief Complaint: Medical manage controlled on home meds  3. DL, statin  4. GERD  5. Parkinson's disease, resume home meds  6.  BPH  7. h/o etoh abuse    Plan of care: home today    Quality:  · DVT Prophylaxis: heparin  · CODE status: Full  · Prasad: no  · Central line: no    Estimated date

## 2018-03-22 NOTE — PROGRESS NOTES
NURSING DISCHARGE NOTE    Discharged Home via Wheelchair.   Accompanied by Family member and Support staff  Belongings Taken by patient/family     VERBAL AND WRITTEN DISCHARGE INSTRUCTIONS GIVEN TO PATIENT THRU GRANDDAUGHTER Juan Carlos Resides (SEE INTE

## 2018-03-22 NOTE — PROGRESS NOTES
[facility]  Progress Note    [facility] Patient Status:  Outpatient in a Bed    1935 MRN [id number]   [facility] 3NW-A Attending Shelley Barnett MD   Hosp Day # 0 PCP Kaiden Church DO     Subjective:  Feels good. dysfunction     Personal history of other malignant neoplasm of skin     Diastolic dysfunction without heart failure     Aortic valve sclerosis     Hypercholesterolemia     Enlarged prostate     History of hernia repair     History of skin cancer     Neopl

## 2018-03-23 ENCOUNTER — PATIENT OUTREACH (OUTPATIENT)
Dept: CASE MANAGEMENT | Age: 83
End: 2018-03-23

## 2018-03-23 DIAGNOSIS — K43.9 VENTRAL HERNIA WITHOUT OBSTRUCTION OR GANGRENE: ICD-10-CM

## 2018-03-23 NOTE — PROGRESS NOTES
Initial Post Discharge Follow Up   Discharge Date: 3/22/18  Contact Date: 3/23/2018    Consent Verification:  Assessment Completed With: Caregiver: Katelin Vázquez patient's daughter Permission received per patient?  written  Other: Chad Snyder, patient's granddau Clavulanate (AUGMENTIN) 875-125 MG Oral Tab Take 1 tablet by mouth 2 (two) times daily. Disp: 20 tablet Rfl: 0   AmLODIPine Besylate 10 MG Oral Tab Take 1 tablet (10 mg total) by mouth daily.  Disp: 90 tablet Rfl: 1   chlorthalidone 25 MG Oral Tab Take 1 ta concerns, Etc): No     Follow up appointments:       Your appointments     Date & Time Appointment Department [facility])    Mar 29, 2018  3:00 PM CDT Exam - Established Patient with Mar Peters, [address] [facility], [address] [place] [address]

## 2018-03-27 NOTE — CDS QUERY
Clarification of Operative Procedure  CLINICAL DOCUMENTATION CLARIFICATION FORM  Dear Doctor:  Clinical information (provided below) suggests an intraoperative tear/puncture/laceration may have occurred.  For accurate ICD-10-CM code assignment to reflect se irrigated with several liters of normal saline solution. There was no evidence of any bleeding or bile leak. A 15 x 10 cm XenMatrix mesh was then inserted into the defect and cut to size.   This was then secured to the periphery using interrupted Prolene

## 2018-03-28 ENCOUNTER — PATIENT MESSAGE (OUTPATIENT)
Dept: FAMILY MEDICINE CLINIC | Facility: CLINIC | Age: 83
End: 2018-03-28

## 2018-03-29 ENCOUNTER — OFFICE VISIT (OUTPATIENT)
Dept: FAMILY MEDICINE CLINIC | Facility: CLINIC | Age: 83
End: 2018-03-29

## 2018-03-29 VITALS
DIASTOLIC BLOOD PRESSURE: 62 MMHG | TEMPERATURE: 98 F | SYSTOLIC BLOOD PRESSURE: 124 MMHG | HEIGHT: 65.75 IN | BODY MASS INDEX: 33.64 KG/M2 | HEART RATE: 76 BPM | WEIGHT: 206.81 LBS | RESPIRATION RATE: 16 BRPM

## 2018-03-29 DIAGNOSIS — G20 PARKINSON'S DISEASE (HCC): ICD-10-CM

## 2018-03-29 DIAGNOSIS — R73.03 PREDIABETES: ICD-10-CM

## 2018-03-29 DIAGNOSIS — N39.498 OTHER URINARY INCONTINENCE: ICD-10-CM

## 2018-03-29 DIAGNOSIS — R35.0 BENIGN PROSTATIC HYPERPLASIA WITH URINARY FREQUENCY: Primary | ICD-10-CM

## 2018-03-29 DIAGNOSIS — Z87.19 HISTORY OF VENTRAL HERNIA REPAIR: ICD-10-CM

## 2018-03-29 DIAGNOSIS — Z98.890 HISTORY OF VENTRAL HERNIA REPAIR: ICD-10-CM

## 2018-03-29 DIAGNOSIS — E87.6 HYPOKALEMIA: ICD-10-CM

## 2018-03-29 DIAGNOSIS — N40.1 BENIGN PROSTATIC HYPERPLASIA WITH URINARY FREQUENCY: Primary | ICD-10-CM

## 2018-03-29 DIAGNOSIS — R73.9 HYPERGLYCEMIA: ICD-10-CM

## 2018-03-29 PROCEDURE — 99495 TRANSJ CARE MGMT MOD F2F 14D: CPT | Performed by: FAMILY MEDICINE

## 2018-03-29 PROCEDURE — 1111F DSCHRG MED/CURRENT MED MERGE: CPT | Performed by: FAMILY MEDICINE

## 2018-03-29 RX ORDER — TAMSULOSIN HYDROCHLORIDE 0.4 MG/1
0.4 CAPSULE ORAL DAILY
Qty: 30 CAPSULE | Refills: 0 | Status: SHIPPED | OUTPATIENT
Start: 2018-03-29 | End: 2018-10-26

## 2018-03-29 NOTE — PROGRESS NOTES
HPI:    Estefany Coy is a 80year old male here today for hospital follow up. Patient is accompanied by his daughter-in-law and granddaughter.     He was discharged from Inpatient hospital, [facility] to Home   Admission Date: 3/21/ mouth every morning. carbidopa-levodopa  MG Oral Tab Take 1 tablet by mouth 3 (three) times daily.    TraMADol HCl 50 MG Oral Tab Take 1 tablet (50 mg total) by mouth 2 (two) times daily as needed for Pain.   simvastatin 20 MG Oral Tab Take 1 tablet exertion  CARDIOVASCULAR: denies chest pain on exertion or palpitations  GI: denies heartburn, denies constipation or diarrhea  MUSCULOSKELETAL: denies pain, normal range of motion of extremities  NEURO: denies headaches, denies dizziness, denies weakness starches. 5. Prediabetes  As above in #4.    6. History of ventral hernia repair  Bedside commode. Shower chair. Walker with seat. - DME - EXTERNAL     7.  Other urinary incontinence  - DME - EXTERNAL           Orders Placed This Encounter      Gamal

## 2018-03-30 ENCOUNTER — APPOINTMENT (OUTPATIENT)
Dept: CT IMAGING | Facility: HOSPITAL | Age: 83
DRG: 389 | End: 2018-03-30
Attending: EMERGENCY MEDICINE
Payer: MEDICARE

## 2018-03-30 ENCOUNTER — TELEPHONE (OUTPATIENT)
Dept: FAMILY MEDICINE CLINIC | Facility: CLINIC | Age: 83
End: 2018-03-30

## 2018-03-30 ENCOUNTER — HOSPITAL ENCOUNTER (INPATIENT)
Facility: HOSPITAL | Age: 83
LOS: 8 days | Discharge: HOME HEALTH | DRG: 389 | End: 2018-04-07
Attending: EMERGENCY MEDICINE | Admitting: INTERNAL MEDICINE
Payer: MEDICARE

## 2018-03-30 DIAGNOSIS — Z87.19 S/P REPAIR OF VENTRAL HERNIA: ICD-10-CM

## 2018-03-30 DIAGNOSIS — K56.609 SBO (SMALL BOWEL OBSTRUCTION) (HCC): Primary | ICD-10-CM

## 2018-03-30 DIAGNOSIS — D72.829 LEUKOCYTOSIS, UNSPECIFIED TYPE: ICD-10-CM

## 2018-03-30 DIAGNOSIS — Z98.890 S/P REPAIR OF VENTRAL HERNIA: ICD-10-CM

## 2018-03-30 PROCEDURE — 99223 1ST HOSP IP/OBS HIGH 75: CPT | Performed by: HOSPITALIST

## 2018-03-30 PROCEDURE — 74177 CT ABD & PELVIS W/CONTRAST: CPT | Performed by: EMERGENCY MEDICINE

## 2018-03-30 RX ORDER — MORPHINE SULFATE 4 MG/ML
4 INJECTION, SOLUTION INTRAMUSCULAR; INTRAVENOUS EVERY 2 HOUR PRN
Status: DISCONTINUED | OUTPATIENT
Start: 2018-03-30 | End: 2018-04-07

## 2018-03-30 RX ORDER — SODIUM CHLORIDE 9 MG/ML
INJECTION, SOLUTION INTRAVENOUS CONTINUOUS
Status: DISCONTINUED | OUTPATIENT
Start: 2018-03-30 | End: 2018-03-30

## 2018-03-30 RX ORDER — ONDANSETRON 2 MG/ML
4 INJECTION INTRAMUSCULAR; INTRAVENOUS ONCE
Status: COMPLETED | OUTPATIENT
Start: 2018-03-30 | End: 2018-03-30

## 2018-03-30 RX ORDER — TAMSULOSIN HYDROCHLORIDE 0.4 MG/1
CAPSULE ORAL
Qty: 90 CAPSULE | Refills: 0 | OUTPATIENT
Start: 2018-03-30

## 2018-03-30 RX ORDER — SODIUM CHLORIDE, SODIUM LACTATE, POTASSIUM CHLORIDE, CALCIUM CHLORIDE 600; 310; 30; 20 MG/100ML; MG/100ML; MG/100ML; MG/100ML
INJECTION, SOLUTION INTRAVENOUS CONTINUOUS
Status: DISCONTINUED | OUTPATIENT
Start: 2018-03-30 | End: 2018-04-03

## 2018-03-30 RX ORDER — HEPARIN SODIUM 5000 [USP'U]/ML
5000 INJECTION, SOLUTION INTRAVENOUS; SUBCUTANEOUS EVERY 8 HOURS SCHEDULED
Status: DISCONTINUED | OUTPATIENT
Start: 2018-03-30 | End: 2018-04-07

## 2018-03-30 RX ORDER — HYDRALAZINE HYDROCHLORIDE 20 MG/ML
10 INJECTION INTRAMUSCULAR; INTRAVENOUS
Status: DISCONTINUED | OUTPATIENT
Start: 2018-03-30 | End: 2018-04-07

## 2018-03-30 RX ORDER — MORPHINE SULFATE 4 MG/ML
4 INJECTION, SOLUTION INTRAMUSCULAR; INTRAVENOUS EVERY 30 MIN PRN
Status: DISCONTINUED | OUTPATIENT
Start: 2018-03-30 | End: 2018-04-07

## 2018-03-30 RX ORDER — SODIUM CHLORIDE 9 MG/ML
125 INJECTION, SOLUTION INTRAVENOUS CONTINUOUS
Status: DISCONTINUED | OUTPATIENT
Start: 2018-03-30 | End: 2018-03-30

## 2018-03-30 RX ORDER — FAMOTIDINE 10 MG/ML
20 INJECTION, SOLUTION INTRAVENOUS 2 TIMES DAILY
Status: DISCONTINUED | OUTPATIENT
Start: 2018-03-30 | End: 2018-04-07

## 2018-03-30 RX ORDER — ONDANSETRON 2 MG/ML
4 INJECTION INTRAMUSCULAR; INTRAVENOUS EVERY 4 HOURS PRN
Status: DISCONTINUED | OUTPATIENT
Start: 2018-03-30 | End: 2018-03-30

## 2018-03-30 RX ORDER — DEXTROSE MONOHYDRATE 25 G/50ML
50 INJECTION, SOLUTION INTRAVENOUS
Status: DISCONTINUED | OUTPATIENT
Start: 2018-03-30 | End: 2018-04-07

## 2018-03-30 RX ORDER — MORPHINE SULFATE 4 MG/ML
2 INJECTION, SOLUTION INTRAMUSCULAR; INTRAVENOUS EVERY 2 HOUR PRN
Status: DISCONTINUED | OUTPATIENT
Start: 2018-03-30 | End: 2018-04-07

## 2018-03-30 RX ORDER — ONDANSETRON 2 MG/ML
4 INJECTION INTRAMUSCULAR; INTRAVENOUS EVERY 6 HOURS PRN
Status: DISCONTINUED | OUTPATIENT
Start: 2018-03-30 | End: 2018-04-07

## 2018-03-30 RX ORDER — MORPHINE SULFATE 4 MG/ML
1 INJECTION, SOLUTION INTRAMUSCULAR; INTRAVENOUS EVERY 2 HOUR PRN
Status: DISCONTINUED | OUTPATIENT
Start: 2018-03-30 | End: 2018-04-07

## 2018-03-30 NOTE — ED PROVIDER NOTES
Patient Seen in: [facility] Emergency Department    History   Patient presents with:  Abdomen/Flank Pain (GI/)    Stated Complaint: vomiting;abd pain;recent abd surgery    HPI    80-year-old male presents to the emergency department for evaluation • Parkinson disease [facility]) 2014   • Personal history of antineoplastic chemotherapy     2010   • PONV (postoperative nausea and vomiting)    • Retained suture 9/29/2015   • Shoulder injury 1987    left    • Tubular adenoma 9-       Past Surgical Hi mucosa is pasty. Oropharynx is normal.  Neck: No adenopathy or thyromegaly. No hoarseness or stridor. Lungs are clear to auscultation. Heart exam: Normal S1-S2 without extra sounds or murmurs. Regular rate and rhythm.   Abdomen: Distended with markedly [facility]       ED Course as of Mar 30 2215  ------------------------------------------------------------  Intravenous access was obtained. The patient was treated with IV fluids, analgesics and antiemetics.      [facility]           Admission disposition: 3/30/2018

## 2018-03-30 NOTE — ED INITIAL ASSESSMENT (HPI)
Patient arrives with c/o fatigue, vomiting dark emesis this morning and recent abdominal surgery. Patient had hernia repair on 3/21, saw PMD yesterday and was prescribed miralax to have a BM.  Patient c/o abdominal bloating and urinary retention, last BM ye

## 2018-03-30 NOTE — H&P
[facility]  History and Physical     Venu Nevarez Patient Status:  Emergency    1935 MRN [id number]   Location [address] [facility] Attending Oscar Slater MD   Hosp Day # 0 PCP Raford Cogan, DO Ch chemotherapy     2010   • PONV (postoperative nausea and vomiting)    • Retained suture 9/29/2015   • Shoulder injury 1987    left    • Tubular adenoma 9-        Past Surgical History: Past Surgical History:  No date: APPENDECTOMY  No date: [address] [address] simvastatin 20 MG Oral Tab Take 1 tablet (20 mg total) by mouth nightly. Disp: 90 tablet Rfl: 1   carbidopa-levodopa  MG Oral Tab Take 1 tablet by mouth 3 (three) times daily.  Disp:  Rfl: 11   TraMADol HCl 50 MG Oral Tab Take 1 tablet (50 mg total) Epic.      ASSESSMENT / PLAN:     1. Small bowel obstruction s/p ventral hernia repair with small bowel resection 3/21  1. NG placed, NPO, IVF, pain and nausea control   2. Per surgery   2. HTN  1. Hold home meds  2. Prn hydralazine   3.  DM - A1c 6.5, not

## 2018-03-30 NOTE — TELEPHONE ENCOUNTER
Pt's granddaughter called and said he is still not urinating and he is throwing up and is dizzy. I talked to Bailee and she said he should go to the ER. I informed Dr. Dona Raymond and she agreed.

## 2018-03-31 ENCOUNTER — APPOINTMENT (OUTPATIENT)
Dept: GENERAL RADIOLOGY | Facility: HOSPITAL | Age: 83
DRG: 389 | End: 2018-03-31
Attending: COLON & RECTAL SURGERY
Payer: MEDICARE

## 2018-03-31 PROCEDURE — 99232 SBSQ HOSP IP/OBS MODERATE 35: CPT | Performed by: HOSPITALIST

## 2018-03-31 PROCEDURE — 74019 RADEX ABDOMEN 2 VIEWS: CPT | Performed by: COLON & RECTAL SURGERY

## 2018-03-31 RX ORDER — ACETAMINOPHEN 325 MG/1
650 TABLET ORAL EVERY 6 HOURS PRN
Status: DISCONTINUED | OUTPATIENT
Start: 2018-03-31 | End: 2018-04-07

## 2018-03-31 RX ORDER — POTASSIUM CHLORIDE 14.9 MG/ML
20 INJECTION INTRAVENOUS ONCE
Status: COMPLETED | OUTPATIENT
Start: 2018-03-31 | End: 2018-03-31

## 2018-03-31 NOTE — PAYOR COMM NOTE
--------------  ADMISSION REVIEW     Payor: Keith Hickman  #:  [id number]  Authorization Number: N/A    Admit date: 3/30/18  Admit time: 8404       Admitting Physician: Hira Up MD  Attending Physician:  Fiona Higgins MD  Primary Care y Notable for the following:     POC Glucose 136 (*)     All other components within normal limits   CBC W/ DIFFERENTIAL - Abnormal; Notable for the following:     WBC 19.6 (*)     Neutrophil Absolute Prelim 16.61 (*)     Neutrophil Absolute 16.61 (*)     Al Intravenous Peg GORDON Morin      Heparin Sodium (Porcine) 5000 UNIT/ML injection 5,000 Units     Date Action Dose Route User    3/31/2018 0825 Given 5000 Units Subcutaneous (Right Lower Abdomen) Anup Mcnamara RN    3/30/2018 2111 Given 5000 Units Subcuta

## 2018-03-31 NOTE — PROGRESS NOTES
SPOKE WITH  AT 0911 REGARDING OBSTRUCTIVE SERIES RESULTS THEN RELAYED & SPOKE WITH DR. GASPAR ABOUT IT AS WELL.

## 2018-03-31 NOTE — CONSULTS
[facility]  Report of Consultation    [address] Patient Status:  Inpatient    1935 MRN [id number]   [facility] 3NW-A Attending Scott Fountain MD   Hosp Day # 1 PCP Dana Zamudio DO     Reason for Consultation: (benign prostatic hyperplasia)    • Cancer (HCC)     skin cancer ? kind   • Cataract     HAD SURGERY- ??HAS LENS IMPLANTS   • [facility]   • Colon polyps 9-   • EKG, abnormal 2/13/2015   • Esophageal reflux     no longer a problem/ reso Facility-Administered Medications:   •  potassium chloride IVPB premix 20 mEq, 20 mEq, Intravenous, Once  •  morphINE sulfate (PF) 4 MG/ML injection 4 mg, 4 mg, Intravenous, Q30 Min PRN  •  lactated ringers infusion, , Intravenous, Continuous  •  Heparin S 1431  03/31/18   0523   RBC  5.24  4.52   HGB  15.9  14.0   HCT  47.4  41.3   MCV  90.5  91.4   MCH  30.3  31.0   MCHC  33.5  33.9   RDW  13.3  13.6   NEPRELIM  16.61*  11.59*   WBC  19.6*  13.6*   PLT  321.0  264.0       Recent Labs   Lab  03/30/18   1431 counseling/coordination of care:  15 Minutes    Total time spent with patient:  27 Minutes    Isaiah Da Silva  3/31/2018  8:43 AM

## 2018-03-31 NOTE — PROGRESS NOTES
[facility]  Progress Note     Yanet Priest Patient Status:  Inpatient    1935 MRN [id number]   [facility] 3NW-A Attending Russ Peñaloza MD   Hosp Day # 1 PCP Brandee Silverman DO     Chief Complaint: abdominal pain obstruction s/p ventral hernia repair with small bowel resection 3/21  1. NG placed, NPO, IVF, pain and nausea control   2. Per surgery   2. HTN  1. Hold home meds  2. Prn hydralazine   3. DM - A1c 6.5, not on meds   1. ICF   4. HLD   5. GERD  6.  Parkinson

## 2018-03-31 NOTE — PLAN OF CARE
ALERT & ORIENTED X4. DENIES PAIN AT THIS TIME . NAUSEUS AT TIMES ANALI WHEN TUBE IS CLAMPED. RIGHT NGT TO LIS DRAINING MODERATE AMOUNT OF THICK BROWNISH YELLOW OUTPUT. KEPT NPO. UP WITH STANDBY ASSIST. PLAN OF CARE DISCUSSED WITH PATIENT. WILL MONITOR.

## 2018-03-31 NOTE — PLAN OF CARE
Assumed care at 1900  A/ox4, Japanese speaking only. vss on room air. c/o mid abd pain at times, but declines pain medication. Medicated with zofran for nausea. NGT to lis, dark brown output, irrigated as needed when output is thick.  Ok to irrigate per

## 2018-04-01 PROCEDURE — 99232 SBSQ HOSP IP/OBS MODERATE 35: CPT | Performed by: HOSPITALIST

## 2018-04-01 NOTE — PROGRESS NOTES
[facility]  Progress Note    Lupe Muhammad Patient Status:  Inpatient    1935 MRN [id number]   [facility] 3NW-A Attending Mari Dalton MD   [facility] Day # 2 PCP Yanick Porras DO     Subjective:  No new complaints, no Suture granuloma     History of malignant neoplasm of colon     Ventral incisional hernia     Nocturia     Hyperglycemia     Rash     Ventral hernia without obstruction or gangrene     SBO (small bowel obstruction) (HCC)     S/P repair of ventral hernia

## 2018-04-01 NOTE — PROGRESS NOTES
[facility]  Progress Note     Tyrel Callahan Patient Status:  Inpatient    1935 MRN [id number]   [facility] 3NW-A Attending Raphael Warren MD   Hosp Day # 2 PCP Brown Vallejo DO     Chief Complaint: abdominal pain • Heparin Sodium (Porcine)  5,000 Units Subcutaneous Q8H [facility] & alf   • famoTIDine  20 mg Intravenous BID       ASSESSMENT / PLAN:        1. Small bowel obstruction s/p ventral hernia repair with small bowel resection 3/21  1.  NG , NPO, IVF, pain and nausea con

## 2018-04-01 NOTE — PROGRESS NOTES
Pt and son stated he sneezed several times and NGT came out completely. New 18 Fr. Tube reinserted, large amount of dark green/brown output noted. Denies any nausea. Pt tolerated insertion well. Will continue to monitor.

## 2018-04-02 ENCOUNTER — APPOINTMENT (OUTPATIENT)
Dept: GENERAL RADIOLOGY | Facility: HOSPITAL | Age: 83
DRG: 389 | End: 2018-04-02
Attending: COLON & RECTAL SURGERY
Payer: MEDICARE

## 2018-04-02 PROCEDURE — 74019 RADEX ABDOMEN 2 VIEWS: CPT | Performed by: COLON & RECTAL SURGERY

## 2018-04-02 PROCEDURE — 99232 SBSQ HOSP IP/OBS MODERATE 35: CPT | Performed by: INTERNAL MEDICINE

## 2018-04-02 NOTE — CM/SW NOTE
04/02/18 1100   CM/SW Screening   Referral Source Physician   Information Source [facility] staff   Patient's Mental Status Alert;Oriented     MSW, CM and Bedside Rn discussed patient's post d/c needs in rounds. Lots of family support per Rn.  No identified nee

## 2018-04-02 NOTE — PROGRESS NOTES
[facility]  Progress Note    Jones Barclayccasin Patient Status:  Inpatient    1935 MRN [id number]   [facility] 3NW-A Attending Abdirahman Verdugo MD   [facility] Day # 3 PCP Ginger Ceballos,      Subjective:  Feels good. No pain. obstruction. No pain. 2. Ngt output decreasing over last 24 hours. 3. Will start clamp trials of NGT today. 4. Encouraged ambulation, up in chair. 5. Discussed possibility of surgery if the obstruction does not resolve with conservative management.   6

## 2018-04-02 NOTE — PROGRESS NOTES
AMBULATED IN HALLWAYS AND WAS UP IN CHAIR .GOT NAUSEUS AT 1400 (6 HRS NGT CLAMPED). RECONNECTED NGT TO LIS AS ORDERED AND HE FELT LESS NAUSEUS.

## 2018-04-02 NOTE — PLAN OF CARE
GASTROINTESTINAL - ADULT    • Maintains or returns to baseline bowel function Not Progressing          Diabetes/Glucose Control    • Glucose maintained within prescribed range Progressing        GASTROINTESTINAL - ADULT    • Minimal or absence of nausea an

## 2018-04-02 NOTE — PROGRESS NOTES
[facility]  Progress Note     Evelena Close Patient Status:  Inpatient    1935 MRN [id number]   [facility] 3NW-A Attending Bernard Anderson MD   [facility] Day # 3 PCP Ramandeep Bell DO     Chief Complaint: abdominal pain 31.0   --    ALKPHO  68   --    AST  12*   --    ALT  9*   --    BILT  0.7   --    TP  7.1   --        Estimated Creatinine Clearance: 59 mL/min (based on SCr of 0.98 mg/dL).     Recent Labs   Lab  03/31/18   0523   PTP  14.6   INR  1.10       No results fo

## 2018-04-02 NOTE — PLAN OF CARE
GASTROINTESTINAL - ADULT    • Maintains or returns to baseline bowel function Not Progressing          Has not passed gas today. No BM.     Diabetes/Glucose Control    • Glucose maintained within prescribed range Progressing        GASTROINTESTINAL - ADULT

## 2018-04-03 PROBLEM — N40.1 BENIGN PROSTATIC HYPERPLASIA WITH URINARY FREQUENCY: Status: ACTIVE | Noted: 2018-04-03

## 2018-04-03 PROBLEM — R32 ABSENCE OF BLADDER CONTINENCE: Status: ACTIVE | Noted: 2018-04-03

## 2018-04-03 PROBLEM — E87.6 HYPOKALEMIA: Status: ACTIVE | Noted: 2018-04-03

## 2018-04-03 PROBLEM — R35.0 BENIGN PROSTATIC HYPERPLASIA WITH URINARY FREQUENCY: Status: ACTIVE | Noted: 2018-04-03

## 2018-04-03 PROCEDURE — 99232 SBSQ HOSP IP/OBS MODERATE 35: CPT | Performed by: INTERNAL MEDICINE

## 2018-04-03 RX ORDER — AMLODIPINE BESYLATE 5 MG/1
10 TABLET ORAL DAILY
Status: DISCONTINUED | OUTPATIENT
Start: 2018-04-03 | End: 2018-04-07

## 2018-04-03 RX ORDER — DEXTROSE AND SODIUM CHLORIDE 5; .9 G/100ML; G/100ML
INJECTION, SOLUTION INTRAVENOUS CONTINUOUS
Status: DISCONTINUED | OUTPATIENT
Start: 2018-04-03 | End: 2018-04-04

## 2018-04-03 RX ORDER — ALFUZOSIN HYDROCHLORIDE 10 MG/1
10 TABLET, EXTENDED RELEASE ORAL
Status: DISCONTINUED | OUTPATIENT
Start: 2018-04-04 | End: 2018-04-07

## 2018-04-03 NOTE — PHYSICAL THERAPY NOTE
PHYSICAL THERAPY QUICK EVALUATION - INPATIENT    Room Number: 303/303-A  Evaluation Date: 4/3/2018    Presenting Problem: SBO   Physician Order: PT Eval and Treat     History of current admit: Pt is 80year old male admitted on 3/30/2018 from home with c Other and unspecified hyperlipidemia    • Parkinson disease ([facility] [address] 2014   • Personal history of antineoplastic chemotherapy     2010   • PONV (postoperative nausea and vomiting)    • Retained suture 9/29/2015   • Shoulder injury 1987    left    • Tubular ad functional limits     NEUROLOGICAL FINDINGS  Neurological Findings: None                   AM-PAC '6-Clicks' INPATIENT SHORT FORM - BASIC MOBILITY  How much difficulty does the patient currently have. ..  -   Turning over in bed (including adjusting bedclot Pertinent comorbidities and personal factors impacting therapy include 4.   Functional outcome measures completed include The AM-PAC '6-Clicks' Inpatient Basic Mobility Short Form was completed and this patient is demonstrating a 0% degree of impairment in

## 2018-04-03 NOTE — PROGRESS NOTES
[facility]  Progress Note    Samira Serna Patient Status:  Inpatient    1935 MRN [id number]   [facility] 3NW-A Attending Lashell Royal MD   [facility] Day # 4 PCP Ronak Choi,      Subjective:  Feels good.   Passing sm type        Plan:  1. Doing well. Slowly resolving small bowel obstruction. 2. Minimal out from NGT and patient tolerated NGT being clamped yesterday. D/c NGT today. 3. Start clear liquids. 4. Encouraged ambulation, up in chair.   5. All questions answ

## 2018-04-03 NOTE — CDS QUERY
Clinical Significance – Radiology Report  Opal Ordaz  Dear Doctor:  Clinical information from the Radiology Report (provided below) includes findings which have not been included in diagnostic statement(s) in the patient's me

## 2018-04-03 NOTE — PROGRESS NOTES
Dr. Dottie Allen paged for lower blood sugar. Waiting for response. 0031: Dr. Dottie Allen notified, new orders received. Will implement.

## 2018-04-03 NOTE — PROGRESS NOTES
[facility]  Progress Note     Alanna Oconnor Patient Status:  Inpatient    1935 MRN [id number]   [facility] 3NW-A Attending Ivania Ceja MD   [address] Day # 4 PCP Armando Wang DO     Chief Complaint: abdominal pain last 72 hours. Medications:   • Insulin Aspart Pen  1-5 Units Subcutaneous 4 times per day   • Heparin Sodium (Porcine)  5,000 Units Subcutaneous Q8H [address]   • famoTIDine  20 mg Intravenous BID       ASSESSMENT / PLAN:        1.  Small bowel obstruction

## 2018-04-03 NOTE — PROGRESS NOTES
HAD A SHORT EPISODE OF NAUSEA AROUND 2000 THEN IT PASSED. NGT CLAMPED AT 2005,PATIENT AMBULATING IN HALLWAY .

## 2018-04-04 PROCEDURE — 99232 SBSQ HOSP IP/OBS MODERATE 35: CPT | Performed by: INTERNAL MEDICINE

## 2018-04-04 RX ORDER — DOCUSATE SODIUM 100 MG/1
100 CAPSULE, LIQUID FILLED ORAL 2 TIMES DAILY
Status: DISCONTINUED | OUTPATIENT
Start: 2018-04-04 | End: 2018-04-07

## 2018-04-04 RX ORDER — MAGNESIUM HYDROXIDE/ALUMINUM HYDROXICE/SIMETHICONE 120; 1200; 1200 MG/30ML; MG/30ML; MG/30ML
30 SUSPENSION ORAL ONCE
Status: COMPLETED | OUTPATIENT
Start: 2018-04-04 | End: 2018-04-04

## 2018-04-04 NOTE — PROGRESS NOTES
[facility]  Progress Note    Bijal Clark Patient Status:  Inpatient    1935 MRN [id number]   [facility] 3NW-A Attending Marga Urrutia MD   [address] Day # 5 PCP Berenice Pallas, DO     Subjective:  Feels good.   Mild pain ventral hernia     Leukocytosis, unspecified type     Benign prostatic hyperplasia with urinary frequency     Hypokalemia     Absence of bladder continence        Plan:  1. Doing well. Slowly resolving small bowel obstruction. Passing flatus. No pain.

## 2018-04-04 NOTE — CM/SW NOTE
04/02/18 1100   CM/SW Referral Data   Referral Source Physician   Reason for Referral Discharge planning   Informant Patient; Children   Patient Info   Patient's Mental Status Alert;Oriented   Patient Communication Issues Language barrier   Choice of Larau Niall

## 2018-04-04 NOTE — PROGRESS NOTES
[facility]  Progress Note     Gwenevere Schirmer Patient Status:  Inpatient    1935 MRN [id number]   [facility] 3NW-A Attending Avila Zarate MD   [facility] Day # 5 PCP Karolina Nolasco DO     Chief Complaint: abdominal pain the last 72 hours.       Medications:   • Insulin Aspart Pen  1-5 Units Subcutaneous TID CC and HS   • AmLODIPine Besylate  10 mg Oral Daily   • carbidopa-levodopa  1 tablet Oral TID   • Alfuzosin HCl ER  10 mg Oral Daily with breakfast   • Heparin Sodium (

## 2018-04-04 NOTE — CM/SW NOTE
Msg left for [facility] [phone], she states patient lives her and pt's son [place]. She would like a [facility] Rn if that is possible. Patient has hx of "[employer]" but she is not sure which company. MSW will check insurance website and make referrals.

## 2018-04-04 NOTE — OCCUPATIONAL THERAPY NOTE
Skilled OT order received, chart reviewed. Screened pt for OT needs. Pt's sons available for translation. Pt/sons deny any current OT needs, stating that pt is independent and has been ambulating in the hallway without difficulty.  Will D/C OT at this time

## 2018-04-05 PROCEDURE — 99232 SBSQ HOSP IP/OBS MODERATE 35: CPT | Performed by: INTERNAL MEDICINE

## 2018-04-05 RX ORDER — METOCLOPRAMIDE HYDROCHLORIDE 5 MG/ML
10 INJECTION INTRAMUSCULAR; INTRAVENOUS EVERY 4 HOURS
Status: COMPLETED | OUTPATIENT
Start: 2018-04-05 | End: 2018-04-06

## 2018-04-05 RX ORDER — POTASSIUM CHLORIDE 14.9 MG/ML
20 INJECTION INTRAVENOUS ONCE
Status: COMPLETED | OUTPATIENT
Start: 2018-04-05 | End: 2018-04-05

## 2018-04-05 RX ORDER — DEXTROSE AND SODIUM CHLORIDE 5; .9 G/100ML; G/100ML
INJECTION, SOLUTION INTRAVENOUS CONTINUOUS
Status: DISCONTINUED | OUTPATIENT
Start: 2018-04-05 | End: 2018-04-07

## 2018-04-05 NOTE — PAYOR COMM NOTE
--------------  CONTINUED STAY REVIEW        4/2    + NAUSEA NO FLATUS NO BM  Chief Complaint: abdominal pain     S: NG tube being clamped. Complains of some nausea while the NG was clamped after about 6 hours and NG back to suction now     1.  Small bowel

## 2018-04-05 NOTE — CM/SW NOTE
MSW updated via [facility] that [facility] can not accept patient. MSW sent additional referrals. 2:30pm  MSW spoke to [facility] who will accept patient.

## 2018-04-05 NOTE — PROGRESS NOTES
Dr. Ellena Kawasaki paged regarding lab results. 1120: Dr. Ellena Kawasaki notified of am labs, will replace potassium through [facility] protocol.

## 2018-04-05 NOTE — PROGRESS NOTES
Pt complaining of heartburn unrelieved by pepcid. Dr. Omero Lopes notified. Orders received. Will implement & continue to monitor.

## 2018-04-05 NOTE — PROGRESS NOTES
[facility]  Progress Note    Tyrel Callahan Patient Status:  Inpatient    1935 MRN [id number]   [facility] 3NW-A Attending Paris Love MD   [address] Day # 6 PCP Brown Vallejo DO     Subjective:  Pt intermittently nause type     Benign prostatic hyperplasia with urinary frequency     Hypokalemia     Absence of bladder continence        Plan:  1. Pt with a small bowel obstruction. Mildly nauseated overnight. Passing flatus.   2. Lengthy discussion with the patient and his

## 2018-04-05 NOTE — PROGRESS NOTES
[facility]  Progress Note     Grayson Crump Patient Status:  Inpatient    1935 MRN [id number]   [facility] 3NW-A Attending Mary Silver MD   [address] Day # 6 PCP Georgia Peña DO     Chief Complaint: abdominal pain mg Intravenous Q4H   • Insulin Aspart Pen  1-5 Units Subcutaneous TID CC and HS   • docusate sodium  100 mg Oral BID   • AmLODIPine Besylate  10 mg Oral Daily   • carbidopa-levodopa  1 tablet Oral TID   • Alfuzosin HCl ER  10 mg Oral Daily with breakfast

## 2018-04-06 PROCEDURE — 99232 SBSQ HOSP IP/OBS MODERATE 35: CPT | Performed by: SURGERY

## 2018-04-06 PROCEDURE — 99232 SBSQ HOSP IP/OBS MODERATE 35: CPT | Performed by: INTERNAL MEDICINE

## 2018-04-06 NOTE — CM/SW NOTE
04/06/18 1121   Discharge disposition   Expected discharge disposition Home-Health   Name of Tyree Frausto A   Discharge transportation Private car     Per nursing rounds, anticipate d/c 4/7, has been accepted to [facility]

## 2018-04-06 NOTE — PROGRESS NOTES
[facility]  Progress Note     Gwenevere Schirmer Patient Status:  Inpatient    1935 MRN [id number]   [facility] 3NW-A Attending Avila Zarate MD   [address] Day # 7 PCP Karolina Nolasco DO     Chief Complaint: abdominal pain mg/dL). No results for input(s): PTP, INR in the last 72 hours. No results for input(s): TROP, CK in the last 72 hours.       Medications:   • Metoclopramide HCl  10 mg Intravenous Q4H   • Insulin Aspart Pen  1-5 Units Subcutaneous TID CC and HS   • d patient    Leta Farmer MD  4/6/2018

## 2018-04-06 NOTE — PROGRESS NOTES
[facility]  Progress Note    Cecily Goltz Patient Status:  Inpatient    1935 MRN [id number]   [facility] 3NW-A Attending Sonja Stinson MD   [facility] Day # 7 PCP Kendrick Zheng DO     Subjective:  Chano fong. \" of ventral hernia     Leukocytosis, unspecified type     Benign prostatic hyperplasia with urinary frequency     Hypokalemia     Absence of bladder continence        Plan:  1. Doing well. Resolving small bowel obstruction. Three BMs yesterday.   No nausea

## 2018-04-07 VITALS
HEART RATE: 61 BPM | TEMPERATURE: 98 F | WEIGHT: 198 LBS | HEIGHT: 70 IN | DIASTOLIC BLOOD PRESSURE: 52 MMHG | OXYGEN SATURATION: 95 % | SYSTOLIC BLOOD PRESSURE: 110 MMHG | RESPIRATION RATE: 16 BRPM | BODY MASS INDEX: 28.35 KG/M2

## 2018-04-07 PROCEDURE — 99239 HOSP IP/OBS DSCHRG MGMT >30: CPT | Performed by: INTERNAL MEDICINE

## 2018-04-07 NOTE — PROGRESS NOTES
[facility]  Progress Note     Grayson Crump Patient Status:  Inpatient    1935 MRN [id number]   [facility] 3NW-A Attending Mary Silver MD   [address] Day # 8 PCP Georgia Peña DO     Chief Complaint: abdominal pain Estimated Creatinine Clearance: 75.1 mL/min (based on SCr of 0.77 mg/dL). No results for input(s): PTP, INR in the last 72 hours. No results for input(s): TROP, CK in the last 72 hours.       Medications:   • Insulin Aspart Pen  1-5 Units Subcut primary care physician within 1 week in office. Follow-up with surgeon as outpatient in 1 week. Staple removal as directed by surgeon in 1 week as outpatient with surgeon.       Julienne Skinner MD  4/7/2018

## 2018-04-07 NOTE — PROGRESS NOTES
[facility]  Progress Note    Carolina Orr Patient Status:  Inpatient    1935 MRN [id number]   [facility] 3NW-A Attending Shannon Reddy MD   [facility] Day # 8 PCP Sonam De Leon,      Subjective:  Feels good.   Eating wel Absence of bladder continence        Plan:  1. Doing well. Resolved small bowel obstruction. 2. Tolerating diet and having BMs. 3. May d/c today. 4. Follow-up with me in 1 week for staple removal.  5. All questions answered.   Daughter present and act

## 2018-04-07 NOTE — DISCHARGE SUMMARY
[facility]  Discharge Summary    [facility] [address] Patient Status:  Inpatient    1935 MRN [id number]   [facility] 3NW-A Attending Mini Kirk MD   [facility] Day # 8 PCP Bijal Richmond DO     Date of Admission: 3/30/2018 reverted back to clears on 4/5/2018. On 4/6/2018 Patient states he had 3 bowel movement,  Passing gas and diet was advanced further. Tolerated diet with no N/V and being discharged home today. Follow-up with surgeon as directed, follow-up with regular outpa MD Nahid Hannah 65    In 1 week      Percy Pina, [address]  [phone]    In 1 week          Physical Exam:  /52 (BP Location: Right arm)   Pulse 61   Temp 97. 9

## 2018-04-07 NOTE — PROGRESS NOTES
NURSING DISCHARGE NOTE    Discharged Home via Wheelchair. Accompanied by Support staff  Belongings Taken by patient/family. Instructions given to patient and family. Both verbalize understanding. All questions and concerns were addressed.   IV dc'

## 2018-04-09 ENCOUNTER — TELEPHONE (OUTPATIENT)
Dept: FAMILY MEDICINE CLINIC | Facility: CLINIC | Age: 83
End: 2018-04-09

## 2018-04-09 ENCOUNTER — PATIENT OUTREACH (OUTPATIENT)
Dept: CASE MANAGEMENT | Age: 83
End: 2018-04-09

## 2018-04-09 DIAGNOSIS — Z98.890 S/P REPAIR OF VENTRAL HERNIA: ICD-10-CM

## 2018-04-09 DIAGNOSIS — K56.609 SBO (SMALL BOWEL OBSTRUCTION) (HCC): ICD-10-CM

## 2018-04-09 DIAGNOSIS — Z87.19 S/P REPAIR OF VENTRAL HERNIA: ICD-10-CM

## 2018-04-09 DIAGNOSIS — H26.40 SECONDARY CATARACT OF LEFT EYE, UNSPECIFIED SECONDARY CATARACT TYPE: Primary | ICD-10-CM

## 2018-04-09 NOTE — PAYOR COMM NOTE
--------------  DISCHARGE REVIEW    Payor: Lisa Garciaciro #:  [id number]  Authorization Number: [id number]      Admit date: 3/30/18  Admit time:  1848  Discharge Date: 4/7/2018 12:42 PM     Admitting Physician: Eunice Bob MD  Attending Physician

## 2018-04-09 NOTE — TELEPHONE ENCOUNTER
Ashish from [facility] called and said she needs a referral for [facility] with Dr. Jean Paul Mendiola. She can be reached at [phone] and the fax # is [phone].

## 2018-04-09 NOTE — TELEPHONE ENCOUNTER
Spoke to [address] and she said they have had problems trying to get hold of the surgeon to have them place the referral so they contacted our office. Not sure if this will be approved for tomorrow but she wants to try.     Procedure scheduled for this pt

## 2018-04-10 ENCOUNTER — TELEPHONE (OUTPATIENT)
Dept: FAMILY MEDICINE CLINIC | Facility: CLINIC | Age: 83
End: 2018-04-10

## 2018-04-10 NOTE — TELEPHONE ENCOUNTER
Spoke to Jeremías hinojosa and she said that she faxed over those scripts to the place but she has not heard anything back. Advised her to call that facility and find out what is going on.  She verbalized understanding

## 2018-04-10 NOTE — TELEPHONE ENCOUNTER
Etelvina Leger from [facility] called and wants to know if you will sign home health orders for Mr. Joy Dodd. He was recently in [facility] for a small bowel obstruction.

## 2018-04-10 NOTE — TELEPHONE ENCOUNTER
I provided a written order to the patient's family for these things at the patient's last appointment. Francisco Song stated that she would take it to the pharmacy to obtain the items.

## 2018-04-10 NOTE — TELEPHONE ENCOUNTER
The phone number is a fax number.   Unable to contact the [facility] health [facility]

## 2018-04-10 NOTE — PROGRESS NOTES
Initial Post Discharge Follow Up   Discharge Date: 4/7/18  Contact Date: 4/9/2018    Consent Verification:  Assessment Completed With: Other: Ashwin Permission received per patient?  written  HIPAA Verified?   Yes    Discharge Dx:    NEYMAR S/P ventral h performing ADL's  o Were you given a specific diet to follow at discharge?   yes  o Which diet? Diet as tolerated      Medications:     Current Outpatient Prescriptions:  tamsulosin HCl 0.4 MG Oral Cap Take 1 capsule (0.4 mg total) by mouth daily.  Disp: 3 time     Needs post D/C:   Now that you are home, are there any needs or concerns you need addressed before your next visit with your PCP?  (DME, meds, disease concerns, Etc): Yes, Family would like an update on DME was it ordered?      Follow up appointmen

## 2018-04-10 NOTE — TELEPHONE ENCOUNTER
Patient discharged home on 4/7/18  Family would like an update on DME was it ordered? (cane , walker w/seat , shower chair , pull ups , and commode)    Triage: Please investigate the above and call Ashwin at [phone] to let her know.   Thanks

## 2018-04-16 ENCOUNTER — OFFICE VISIT (OUTPATIENT)
Dept: FAMILY MEDICINE CLINIC | Facility: CLINIC | Age: 83
End: 2018-04-16

## 2018-04-16 VITALS
HEIGHT: 65.75 IN | WEIGHT: 197.19 LBS | RESPIRATION RATE: 18 BRPM | HEART RATE: 76 BPM | DIASTOLIC BLOOD PRESSURE: 58 MMHG | SYSTOLIC BLOOD PRESSURE: 128 MMHG | BODY MASS INDEX: 32.07 KG/M2

## 2018-04-16 DIAGNOSIS — Z98.890 S/P REPAIR OF VENTRAL HERNIA: ICD-10-CM

## 2018-04-16 DIAGNOSIS — R73.03 PREDIABETES: ICD-10-CM

## 2018-04-16 DIAGNOSIS — G20 PARKINSON DISEASE (HCC): Primary | ICD-10-CM

## 2018-04-16 DIAGNOSIS — G20 PARKINSON DISEASE (HCC): ICD-10-CM

## 2018-04-16 DIAGNOSIS — R73.9 HYPERGLYCEMIA: ICD-10-CM

## 2018-04-16 DIAGNOSIS — Z85.038 HISTORY OF MALIGNANT NEOPLASM OF COLON: ICD-10-CM

## 2018-04-16 DIAGNOSIS — I10 ESSENTIAL HYPERTENSION: ICD-10-CM

## 2018-04-16 DIAGNOSIS — Z87.19 S/P REPAIR OF VENTRAL HERNIA: ICD-10-CM

## 2018-04-16 DIAGNOSIS — K56.609 SBO (SMALL BOWEL OBSTRUCTION) (HCC): Primary | ICD-10-CM

## 2018-04-16 DIAGNOSIS — K56.7 ILEUS (HCC): ICD-10-CM

## 2018-04-16 PROBLEM — D58.2 ELEVATED HEMOGLOBIN: Status: ACTIVE | Noted: 2018-04-16

## 2018-04-16 PROBLEM — D58.2 ELEVATED HEMOGLOBIN (HCC): Status: ACTIVE | Noted: 2018-04-16

## 2018-04-16 PROCEDURE — 1111F DSCHRG MED/CURRENT MED MERGE: CPT | Performed by: FAMILY MEDICINE

## 2018-04-16 PROCEDURE — 99214 OFFICE O/P EST MOD 30 MIN: CPT | Performed by: FAMILY MEDICINE

## 2018-04-16 NOTE — PATIENT INSTRUCTIONS
Coma alimentos saludables para espinoza corazón  Lo que coma tiene un gran impacto en la saritha de espinoza corazón. De hecho, si come de [employer] Saritha marii podrá disminuir muchos de eagle riesgos cardíacos al [employer].  Por ejemplo, le ayudará a [employer], eagle n · Cuente las calorías. Mojgan caloría es mojgan unidad de [place]. Espinoza cuerpo quema calorías para obtener combustible, robert si usted come Chandra American espinoza cuerpo consume, las calorías adicionales se guardan en forma de grasa.  Espinoza proveedor [address] Más información sobre la alimentación saludable para el corazón  Ale las etiquetas de los alimentos  La alimentación saludable comienza en la ladi de alimentos. Preste atención a las etiquetas de nutrición que traen los alimentos empaquetados.  Busque pro

## 2018-04-16 NOTE — PROGRESS NOTES
HPI:    Josee Dangelo is a 80year old male here today for hospital follow up. He was discharged from Inpatient hospital, [facility] to Home   Admission Date: 3/30/18   Discharge Date: 4/7/18  Hospital Discharge Diagnoses:  1.  Small bow Prescriptions on File Prior to Visit:  tamsulosin HCl 0.4 MG Oral Cap Take 1 capsule (0.4 mg total) by mouth daily. AmLODIPine Besylate 10 MG Oral Tab Take 1 tablet (10 mg total) by mouth daily.    chlorthalidone 25 MG Oral Tab Take 1 tablet (25 mg total) smokeless tobacco. He reports that he does not drink alcohol or use drugs.      ROS:   GENERAL:  energy stable, no sweating  SKIN: denies any unusual rash  EYES: denies blurred vision or double vision  HEENT: denies nasal congestion, sinus pain or ST  LUNGS above in #1. 4. Parkinson disease [facility])  Patient has been seeing Dr. Ingrid Pandya, however Dr. Ingrid Pandya is no longer available at this location. Patient to make appointment to see Dr. Justa Nunez. 5. Essential hypertension  Well-controlled.   Continue ailyn

## 2018-04-16 NOTE — TELEPHONE ENCOUNTER
Medication: Carbidopa-Levodopa  mg    Date of last refill: Historical  [place]     Last office visit:04/05/17  Due back to clinic per last office note:  RTN in 1 year by 04/05/18  Date next office visit scheduled:

## 2018-04-17 ENCOUNTER — OFFICE VISIT (OUTPATIENT)
Dept: SURGERY | Facility: CLINIC | Age: 83
End: 2018-04-17

## 2018-04-17 VITALS
HEART RATE: 67 BPM | BODY MASS INDEX: 31.08 KG/M2 | DIASTOLIC BLOOD PRESSURE: 72 MMHG | WEIGHT: 198 LBS | TEMPERATURE: 97 F | HEIGHT: 67 IN | SYSTOLIC BLOOD PRESSURE: 144 MMHG

## 2018-04-17 DIAGNOSIS — Z98.890 S/P REPAIR OF VENTRAL HERNIA: ICD-10-CM

## 2018-04-17 DIAGNOSIS — Z87.19 S/P REPAIR OF VENTRAL HERNIA: ICD-10-CM

## 2018-04-17 DIAGNOSIS — K56.609 SBO (SMALL BOWEL OBSTRUCTION) (HCC): ICD-10-CM

## 2018-04-17 DIAGNOSIS — K43.9 VENTRAL HERNIA WITHOUT OBSTRUCTION OR GANGRENE: Primary | ICD-10-CM

## 2018-04-17 PROCEDURE — 99024 POSTOP FOLLOW-UP VISIT: CPT | Performed by: PHYSICIAN ASSISTANT

## 2018-04-17 NOTE — PROGRESS NOTES
Post Operative Visit Note       Active Problems  1. Ventral hernia without obstruction or gangrene    2. S/P repair of ventral hernia    3.  SBO (small bowel obstruction) [facility])         Chief Complaint   Patient presents with:  Post-Op: post op visit hernia defibrillator shock to come out per daughter,   • Aortic sclerosis 2/13/2015   • Atrophic gastritis 9-    chronic, inactive   • BPH (benign prostatic hyperplasia)    • Cancer (HCC)     skin cancer ? kind   • Cataract     HAD SURGERY- ??HAS LENS IMPL Years of education:                 Number of children:               Social History Main Topics    Smoking status: Former Smoker                                                                Packs/day: 0.25      Years: 67.00          Types: Cigarett Musculoskeletal: Negative for arthralgias and myalgias. Skin: Negative for color change and rash. Neurological: Negative for tremors, syncope and weakness. Hematological: Negative for adenopathy. Does not bruise/bleed easily.    Psychiatric/Behavior serosal adhesions.  -Separate donuts of small intestine.  -Negative for malignancy. Plan   The patient is recovering well following surgery. We reviewed his procedure, pathology, and appropriate aftercare in detail at today's visit.     Pathology dem Referrals   None    Follow Up  Return for colonoscopy consult with Dr. Charles Urbina.     SUDHAKAR Brown

## 2018-04-23 ENCOUNTER — OFFICE VISIT (OUTPATIENT)
Dept: SURGERY | Facility: CLINIC | Age: 83
End: 2018-04-23

## 2018-04-23 VITALS
HEIGHT: 66 IN | BODY MASS INDEX: 31.34 KG/M2 | WEIGHT: 195 LBS | SYSTOLIC BLOOD PRESSURE: 165 MMHG | DIASTOLIC BLOOD PRESSURE: 58 MMHG | HEART RATE: 71 BPM

## 2018-04-23 DIAGNOSIS — Z85.038 HISTORY OF MALIGNANT NEOPLASM OF COLON: Primary | ICD-10-CM

## 2018-04-23 PROCEDURE — 99213 OFFICE O/P EST LOW 20 MIN: CPT | Performed by: SURGERY

## 2018-04-23 NOTE — PROGRESS NOTES
Follow Up Visit Note       Active Problems      1.  History of malignant neoplasm of colon          Chief Complaint   Patient presents with:  Colonoscopy: Colon cancer 2010, colon resection at [facility], last colonoscopy 9/16/14. (scanned into Epic)        H Surgical History:  No date: APPENDECTOMY  No date: ARTHROSCOPY OF JOINT UNLISTED Bilateral      Comment: SURGERY ON BOTH SHOULDERS - UNSURE IF SCOPE OR               OPEN  No date: CATARACT Bilateral  No date: COLECTOMY  9-: COLONOSCOPY      Comment Tab Take 1 tablet (10 mg total) by mouth daily. Disp: 90 tablet Rfl: 1   chlorthalidone 25 MG Oral Tab Take 1 tablet (25 mg total) by mouth every morning. Disp: 90 tablet Rfl: 1   simvastatin 20 MG Oral Tab Take 1 tablet (20 mg total) by mouth nightly.  Dis diaphoretic. Nursing note and vitals reviewed. Assessment   History of malignant neoplasm of colon  (primary encounter diagnosis)    Pt in need of a colonoscopy given h/o colon cancer in 2010.     Plan   · Colonoscopy scheduled at the [facility]

## 2018-04-24 RX ORDER — POLYETHYLENE GLYCOL 3350, SODIUM CHLORIDE, SODIUM BICARBONATE, POTASSIUM CHLORIDE 420; 11.2; 5.72; 1.48 G/4L; G/4L; G/4L; G/4L
POWDER, FOR SOLUTION ORAL
Qty: 1 BOTTLE | Refills: 0 | Status: SHIPPED | OUTPATIENT
Start: 2018-04-24 | End: 2020-01-28

## 2018-05-14 ENCOUNTER — TELEPHONE (OUTPATIENT)
Dept: FAMILY MEDICINE CLINIC | Facility: CLINIC | Age: 83
End: 2018-05-14

## 2018-05-14 NOTE — TELEPHONE ENCOUNTER
OT discharge paper have been signed and faxed back to Narinder at [phone], [facility],

## 2018-06-09 ENCOUNTER — PATIENT OUTREACH (OUTPATIENT)
Dept: FAMILY MEDICINE CLINIC | Facility: CLINIC | Age: 83
End: 2018-06-09

## 2018-06-15 ENCOUNTER — TELEPHONE (OUTPATIENT)
Dept: FAMILY MEDICINE CLINIC | Facility: CLINIC | Age: 83
End: 2018-06-15

## 2018-06-28 ENCOUNTER — TELEPHONE (OUTPATIENT)
Dept: FAMILY MEDICINE CLINIC | Facility: CLINIC | Age: 83
End: 2018-06-28

## 2018-06-28 DIAGNOSIS — G20 PARKINSON DISEASE (HCC): ICD-10-CM

## 2018-06-28 NOTE — TELEPHONE ENCOUNTER
These were faxed on 4/20/18 to [facility].   Faxed again today to [facility] at [phone]

## 2018-06-28 NOTE — TELEPHONE ENCOUNTER
Jacinto Ribeiro from [address] called and said she is still waiting for a signed OTC eval and a signed order from 4/20/18. Have those orders been signed and faxed back already?

## 2018-06-29 ENCOUNTER — TELEPHONE (OUTPATIENT)
Dept: FAMILY MEDICINE CLINIC | Facility: CLINIC | Age: 83
End: 2018-06-29

## 2018-06-29 NOTE — TELEPHONE ENCOUNTER
Pt last seen by Dr. Ingrid Pandya in 2017, on 4/5/18. Pt scheduled appt with Dr. Justa Nunez for 5/30/18, but canceled and never rescheduled. Will refuse this request.  Pt must be seen in office by one of our providers before this can be filled.

## 2018-07-02 ENCOUNTER — TELEPHONE (OUTPATIENT)
Dept: SURGERY | Facility: CLINIC | Age: 83
End: 2018-07-02

## 2018-09-27 ENCOUNTER — TELEPHONE (OUTPATIENT)
Dept: FAMILY MEDICINE CLINIC | Facility: CLINIC | Age: 83
End: 2018-09-27

## 2018-10-26 ENCOUNTER — OFFICE VISIT (OUTPATIENT)
Dept: FAMILY MEDICINE CLINIC | Facility: CLINIC | Age: 83
End: 2018-10-26
Payer: MEDICARE

## 2018-10-26 VITALS
BODY MASS INDEX: 31 KG/M2 | HEART RATE: 60 BPM | SYSTOLIC BLOOD PRESSURE: 124 MMHG | DIASTOLIC BLOOD PRESSURE: 60 MMHG | WEIGHT: 195 LBS

## 2018-10-26 DIAGNOSIS — I51.7 LAE (LEFT ATRIAL ENLARGEMENT): ICD-10-CM

## 2018-10-26 DIAGNOSIS — R73.9 HYPERGLYCEMIA: ICD-10-CM

## 2018-10-26 DIAGNOSIS — L30.9 DERMATITIS: ICD-10-CM

## 2018-10-26 DIAGNOSIS — I10 ESSENTIAL HYPERTENSION, BENIGN: Primary | ICD-10-CM

## 2018-10-26 DIAGNOSIS — I51.7 LVH (LEFT VENTRICULAR HYPERTROPHY): ICD-10-CM

## 2018-10-26 DIAGNOSIS — D58.2 ELEVATED HEMOGLOBIN (HCC): ICD-10-CM

## 2018-10-26 DIAGNOSIS — I35.8 AORTIC VALVE SCLEROSIS: ICD-10-CM

## 2018-10-26 PROCEDURE — 99214 OFFICE O/P EST MOD 30 MIN: CPT | Performed by: FAMILY MEDICINE

## 2018-10-26 RX ORDER — CHLORTHALIDONE 25 MG/1
25 TABLET ORAL EVERY MORNING
Qty: 90 TABLET | Refills: 1 | Status: SHIPPED | OUTPATIENT
Start: 2018-10-26 | End: 2019-02-11

## 2018-10-26 RX ORDER — AMLODIPINE BESYLATE 10 MG/1
10 TABLET ORAL DAILY
Qty: 90 TABLET | Refills: 1 | Status: SHIPPED | OUTPATIENT
Start: 2018-10-26 | End: 2020-01-28

## 2018-10-26 RX ORDER — TRIAMCINOLONE ACETONIDE 5 MG/G
1 CREAM TOPICAL 2 TIMES DAILY
Qty: 15 G | Refills: 0 | Status: SHIPPED | OUTPATIENT
Start: 2018-10-26 | End: 2020-02-11 | Stop reason: ALTCHOICE

## 2018-10-26 NOTE — PROGRESS NOTES
Abner Neri is a 80year old male. HPI:       Derm:  Pt saw Dr. Cam Miramontes a couple of weeks ago, has been treated for precancerous and cancerous skin lesions. Rash of right forearm x 4 days. Itchy, not painful.   Has been working out in th abuse ([facility] [address] 8/30/2017    Episodic   • Anesthesia complication     difficulty awakening after a surgery many years ago- needed defibrillator shock to come out per daughter,   • Aortic sclerosis 2/13/2015   • Atrophic gastritis 9-    chronic, inactive Pack years: 16.75        Types: Cigarettes        Quit date: 3/10/2015        Years since quitting: 3.6      Smokeless tobacco: Never Used    Alcohol use: No      Comment: DRINKS 5-6 WHEN IN [place]    Drug use: No        Family History   Problem Relatio focal weakness  PSYCH: affect normal        DATA:    Transthoracic Echocardiogram      Name:Hiro Chaparro      Date: 2015 :  1935 Ht:  66in    BP: 130 / 80   MRN:  [id number]    Age:  80years    Wt:  209lb   HR: 75bpm   Loc:  [facility]       G AST (SGOT)      15 - 41 U/L 12 (L) 19 13 (L)   ALT (SGPT)      17 - 63 U/L 9 (L) 10 (L) <6 (L)   Total Bilirubin      0.1 - 2.0 mg/dL 0.7 0.6 0.6   TOTAL PROTEIN      6.1 - 8.3 g/dL 7.1 8.4 (H) 6.7   Albumin      3.5 - 4.8 g/dL 3.1 (L) 3.8 2.8 (L)   Sodi transesophageal echocardiogram with Doppler.    - CARD ECHO 2D DOPPLER (CPT=93306); Future    7. Hyperglycemia  Prediabetes versus diabetes.   Check A1c to further evaluate.    - HEMOGLOBIN A1C; Future      Medication use, risks, benefits, side effects and

## 2018-11-02 NOTE — PATIENT INSTRUCTIONS
Dermatitis atópica (adulto)  La dermatitis atópica es un salpullido enrojecido con comezón. También se conoce jorden eczema. El salpullido es crónico, o continuo. Puede aparecer y desaparecer con el tiempo.  La enfermedad suele ser Donice Mary, y se traspasa d · Use difenhidramina oral para ayudar a reducir la picazón. Es un antihistamínico que puede comprar en farmacias y tiendas de alimentos. Puede que le cause somnolencia. Por eso, use dosis más pequeñas colton el día. También puede usar loratadina.  Es un an

## 2018-11-08 ENCOUNTER — APPOINTMENT (OUTPATIENT)
Dept: LAB | Age: 83
End: 2018-11-08
Attending: FAMILY MEDICINE
Payer: MEDICARE

## 2018-11-08 DIAGNOSIS — I10 ESSENTIAL HYPERTENSION, BENIGN: ICD-10-CM

## 2018-11-08 DIAGNOSIS — R73.03 PREDIABETES: ICD-10-CM

## 2018-11-08 DIAGNOSIS — R73.9 HYPERGLYCEMIA: ICD-10-CM

## 2018-11-08 PROCEDURE — 80053 COMPREHEN METABOLIC PANEL: CPT

## 2018-11-08 PROCEDURE — 80061 LIPID PANEL: CPT

## 2018-11-08 PROCEDURE — 36415 COLL VENOUS BLD VENIPUNCTURE: CPT

## 2018-11-08 PROCEDURE — 83036 HEMOGLOBIN GLYCOSYLATED A1C: CPT

## 2018-11-12 DIAGNOSIS — E78.00 HYPERCHOLESTEROLEMIA: ICD-10-CM

## 2018-11-13 DIAGNOSIS — E87.6 HYPOKALEMIA: Primary | ICD-10-CM

## 2018-11-13 NOTE — TELEPHONE ENCOUNTER
Chart reviewed. It states: \"patient discontinued\", and I am not sure why. Cholesterol panel, specifically the LDL, is worse.   Before refilling the medication find out if patient was taking the simvastatin consistently leading up to his last blood draw,

## 2018-11-14 RX ORDER — SIMVASTATIN 20 MG
TABLET ORAL
Qty: 90 TABLET | Refills: 0 | Status: SHIPPED | OUTPATIENT
Start: 2018-11-14 | End: 2019-02-11

## 2018-11-14 NOTE — TELEPHONE ENCOUNTER
Per patients granddaughter Adam Staff patient stopped taking his cholesterol med [place]. Pt aware he should continue taking his medication.

## 2018-11-14 NOTE — TELEPHONE ENCOUNTER
Claudia Miller    Can you call this pt's daughter and ask her about this cholesterol med ? And then let me know. See Dr. Bello Teague question below.   thanks

## 2019-02-07 DIAGNOSIS — I10 ESSENTIAL HYPERTENSION, BENIGN: ICD-10-CM

## 2019-02-07 RX ORDER — CHLORTHALIDONE 25 MG/1
TABLET ORAL
Qty: 90 TABLET | Refills: 0 | OUTPATIENT
Start: 2019-02-07

## 2019-02-11 DIAGNOSIS — I10 ESSENTIAL HYPERTENSION, BENIGN: ICD-10-CM

## 2019-02-11 DIAGNOSIS — E78.00 HYPERCHOLESTEROLEMIA: ICD-10-CM

## 2019-02-11 RX ORDER — CHLORTHALIDONE 25 MG/1
TABLET ORAL
Qty: 90 TABLET | Refills: 0 | Status: SHIPPED | OUTPATIENT
Start: 2019-02-11 | End: 2020-01-28

## 2019-02-11 RX ORDER — SIMVASTATIN 20 MG
TABLET ORAL
Qty: 90 TABLET | Refills: 0 | Status: SHIPPED | OUTPATIENT
Start: 2019-02-11 | End: 2020-02-11

## 2019-08-13 ENCOUNTER — TELEPHONE (OUTPATIENT)
Dept: FAMILY MEDICINE CLINIC | Facility: CLINIC | Age: 84
End: 2019-08-13

## 2019-08-13 DIAGNOSIS — R21 RASH OF FACE: ICD-10-CM

## 2019-08-13 DIAGNOSIS — C44.90 SKIN CANCER: ICD-10-CM

## 2019-08-13 DIAGNOSIS — Z85.828 HISTORY OF SKIN CANCER: ICD-10-CM

## 2019-08-13 DIAGNOSIS — L30.9 DERMATITIS: Primary | ICD-10-CM

## 2019-08-13 NOTE — TELEPHONE ENCOUNTER
LOV with Dr. Tran Browning was 10/26/2018 and patient was to come back for super visit 1/26/19. Has upcoming appointment 8/29/2019    Patient requesting referral for Macho Germain PA-C at Dr. Roque Garrett office from [facility]. Has appointment with them 8/22/2019.

## 2019-08-13 NOTE — TELEPHONE ENCOUNTER
[facility] Clinical Staff   Cc: [facility] Referral Pool   Phone Number: [phone]             .Reason for the order/referral: [address]   PCP: Etta Reddy   Refer to Provider (first and last name): Mono Vaca   Specialty: Pearl Espinoza

## 2019-10-25 ENCOUNTER — PATIENT OUTREACH (OUTPATIENT)
Dept: FAMILY MEDICINE CLINIC | Facility: CLINIC | Age: 84
End: 2019-10-25

## 2019-10-25 NOTE — PROGRESS NOTES
Patient is due for Subsequent Medicare Annual Wellness visit, Left message to call and schedule appointment.

## 2019-11-15 ENCOUNTER — PATIENT OUTREACH (OUTPATIENT)
Dept: FAMILY MEDICINE CLINIC | Facility: CLINIC | Age: 84
End: 2019-11-15

## 2019-11-15 NOTE — PROGRESS NOTES
Patient is due for a Medical Annual Wellness visit. Spoke with Janice Sparrow, Patient is in [facility] and will return until next year.

## 2020-01-28 ENCOUNTER — OFFICE VISIT (OUTPATIENT)
Dept: FAMILY MEDICINE CLINIC | Facility: CLINIC | Age: 85
End: 2020-01-28
Payer: MEDICARE

## 2020-01-28 VITALS
TEMPERATURE: 98 F | SYSTOLIC BLOOD PRESSURE: 184 MMHG | WEIGHT: 198 LBS | HEIGHT: 65.55 IN | BODY MASS INDEX: 32.59 KG/M2 | DIASTOLIC BLOOD PRESSURE: 82 MMHG | HEART RATE: 66 BPM

## 2020-01-28 DIAGNOSIS — Z23 IMMUNIZATION DUE: ICD-10-CM

## 2020-01-28 DIAGNOSIS — I10 ESSENTIAL HYPERTENSION, BENIGN: Primary | ICD-10-CM

## 2020-01-28 DIAGNOSIS — R31.9 HEMATURIA, UNSPECIFIED TYPE: ICD-10-CM

## 2020-01-28 DIAGNOSIS — G20 PARKINSON DISEASE (HCC): ICD-10-CM

## 2020-01-28 LAB
BILIRUB UR QL STRIP.AUTO: NEGATIVE
CLARITY UR REFRACT.AUTO: CLEAR
COLOR UR AUTO: YELLOW
GLUCOSE UR STRIP.AUTO-MCNC: NEGATIVE MG/DL
KETONES UR STRIP.AUTO-MCNC: NEGATIVE MG/DL
LEUKOCYTE ESTERASE UR QL STRIP.AUTO: NEGATIVE
NITRITE UR QL STRIP.AUTO: NEGATIVE
PH UR STRIP.AUTO: 7 [PH] (ref 4.5–8)
PROT UR STRIP.AUTO-MCNC: NEGATIVE MG/DL
RBC UR QL AUTO: NEGATIVE
SP GR UR STRIP.AUTO: 1.02 (ref 1–1.03)
UROBILINOGEN UR STRIP.AUTO-MCNC: <2 MG/DL

## 2020-01-28 PROCEDURE — 88108 CYTOPATH CONCENTRATE TECH: CPT | Performed by: FAMILY MEDICINE

## 2020-01-28 PROCEDURE — 99215 OFFICE O/P EST HI 40 MIN: CPT | Performed by: FAMILY MEDICINE

## 2020-01-28 PROCEDURE — 90662 IIV NO PRSV INCREASED AG IM: CPT | Performed by: FAMILY MEDICINE

## 2020-01-28 PROCEDURE — 87086 URINE CULTURE/COLONY COUNT: CPT | Performed by: FAMILY MEDICINE

## 2020-01-28 PROCEDURE — G0008 ADMIN INFLUENZA VIRUS VAC: HCPCS | Performed by: FAMILY MEDICINE

## 2020-01-28 PROCEDURE — 81003 URINALYSIS AUTO W/O SCOPE: CPT | Performed by: FAMILY MEDICINE

## 2020-01-28 RX ORDER — CLOTRIMAZOLE AND BETAMETHASONE DIPROPIONATE 10; .64 MG/G; MG/G
CREAM TOPICAL
Qty: 45 G | Refills: 0 | Status: SHIPPED | OUTPATIENT
Start: 2020-01-28 | End: 2021-09-09 | Stop reason: ALTCHOICE

## 2020-01-28 RX ORDER — LEVOCETIRIZINE DIHYDROCHLORIDE 5 MG/1
5 TABLET, FILM COATED ORAL EVERY EVENING
Qty: 90 TABLET | Refills: 0 | Status: ON HOLD | OUTPATIENT
Start: 2020-01-28 | End: 2020-10-18

## 2020-01-28 RX ORDER — CHLORTHALIDONE 25 MG/1
TABLET ORAL
Qty: 90 TABLET | Refills: 1 | Status: SHIPPED | OUTPATIENT
Start: 2020-01-28 | End: 2020-02-11

## 2020-01-28 RX ORDER — AMLODIPINE BESYLATE 10 MG/1
10 TABLET ORAL DAILY
Qty: 90 TABLET | Refills: 1 | Status: SHIPPED | OUTPATIENT
Start: 2020-01-28 | End: 2020-07-28

## 2020-01-28 NOTE — PATIENT INSTRUCTIONS
Empeza chlorothalidone cada ankita    En [place] semana, empeza la otra (amlodipine)    Vamos a llamar con las resultas de [place] levocetirizine para comezon    [employer], llama antes si necessita

## 2020-01-30 LAB — NON GYNE INTERPRETATION: NEGATIVE

## 2020-01-30 NOTE — PROGRESS NOTES
Sohail Zhang is a 80year old male here for Patient presents with:  Hypertension: Patient stopped taking medication 3 months ago, patient has noticed urine is a dark brown. No urine smell, no pain when urinating.        HPI:     Stopped all meds 3 mon Procedure Laterality Date   • APPENDECTOMY     • ARTHROSCOPY OF JOINT UNLISTED Bilateral     SURGERY ON BOTH SHOULDERS - UNSURE IF SCOPE OR OPEN   • CATARACT Bilateral    • COLECTOMY     • COLONOSCOPY  9-    Dr. Aly, [place]   • HERNIA SURGER 20 MG Oral Tab TAKE 1 TABLET(20 MG) BY MOUTH EVERY NIGHT (Patient not taking: Reported on 1/28/2020) 90 tablet 0   • triamcinolone acetonide 0.5 % External Cream Apply 1 Application topically 2 (two) times daily.  Up to 7 days (Patient not taking: Reported Disp Refills   • chlorthalidone 25 MG Oral Tab 90 tablet 1     Sig: TAKE 1 TABLET(25 MG) BY MOUTH EVERY MORNING   • amLODIPine Besylate 10 MG Oral Tab 90 tablet 1     Sig: Take 1 tablet (10 mg total) by mouth daily.    • Levocetirizine Dihydrochloride (Riki Kuo

## 2020-02-11 ENCOUNTER — LAB REQUISITION (OUTPATIENT)
Dept: LAB | Facility: HOSPITAL | Age: 85
End: 2020-02-11
Payer: MEDICARE

## 2020-02-11 ENCOUNTER — OFFICE VISIT (OUTPATIENT)
Dept: FAMILY MEDICINE CLINIC | Facility: CLINIC | Age: 85
End: 2020-02-11
Payer: MEDICARE

## 2020-02-11 VITALS
DIASTOLIC BLOOD PRESSURE: 60 MMHG | WEIGHT: 195 LBS | SYSTOLIC BLOOD PRESSURE: 132 MMHG | TEMPERATURE: 97 F | HEIGHT: 65.95 IN | BODY MASS INDEX: 31.34 KG/M2 | HEART RATE: 82 BPM

## 2020-02-11 DIAGNOSIS — L30.9 DERMATITIS: ICD-10-CM

## 2020-02-11 DIAGNOSIS — I10 ESSENTIAL HYPERTENSION, BENIGN: Primary | ICD-10-CM

## 2020-02-11 DIAGNOSIS — R31.9 HEMATURIA, UNSPECIFIED TYPE: ICD-10-CM

## 2020-02-11 DIAGNOSIS — D48.5 NEOPLASM OF UNCERTAIN BEHAVIOR OF SKIN: ICD-10-CM

## 2020-02-11 DIAGNOSIS — E78.2 MIXED HYPERLIPIDEMIA: ICD-10-CM

## 2020-02-11 DIAGNOSIS — L29.9 ITCHING: ICD-10-CM

## 2020-02-11 DIAGNOSIS — R73.9 HYPERGLYCEMIA: ICD-10-CM

## 2020-02-11 PROCEDURE — 88305 TISSUE EXAM BY PATHOLOGIST: CPT | Performed by: DERMATOLOGY

## 2020-02-11 PROCEDURE — 99214 OFFICE O/P EST MOD 30 MIN: CPT | Performed by: FAMILY MEDICINE

## 2020-02-11 RX ORDER — HYDROXYZINE HYDROCHLORIDE 25 MG/1
TABLET, FILM COATED ORAL
Qty: 30 TABLET | Refills: 1 | Status: SHIPPED | OUTPATIENT
Start: 2020-02-11 | End: 2020-07-28

## 2020-02-11 NOTE — PATIENT INSTRUCTIONS
Va a quest para sean en ayunas    Empeza zyrtec cada manana    Empeza hydroxyzine en la noche cuando necessita para comezon    Trata sarna lotion cuando necessita para comezon (sin receta)    Regresa en un mes con Dr. Annie French para espinoza physico general

## 2020-02-11 NOTE — PROGRESS NOTES
Rasheed Alvares is a 80year old male here for Patient presents with:  Hypertension: 2 Week follow up   Itchiness: itchiness all over body follow up      HPI:       1.  Essential hypertension, benign  -improved back on amlodipine  -tolerating without iss UNSURE IF SCOPE OR OPEN   • CATARACT Bilateral    • COLECTOMY     • COLONOSCOPY  9-    Dr. Manuelito uMsa [place], [place]   • HERNIA SURGERY     • HERNIA VENTRAL REPAIR N/A 3/21/2018    Performed by Frandy Enciso MD at [address] [place] [address]   • SKIN SURGERY Left 4-14-15 Size: adult)   Pulse 82   Temp 97.3 °F (36.3 °C) (Oral)   Ht 65.95\"   Wt 195 lb (88.5 kg)   BMI 31.53 kg/m²     Gen: NAD, alert and oriented x 3  HEENT: NCAT, pupils equal and round  Pulm: CTAB, no wheezing  CV: RRR, no murmurs  Ext: full ROM  Psych: norm

## 2020-03-23 NOTE — PLAN OF CARE
Problem: Wound  Goal: Optimal Wound Healing  3/23/2020 1059 by Ruthy Mckeon LPN  Outcome: Met  3/23/2020 1058 by Ruthy Mckeon LPN  Outcome: Ongoing, Progressing      Diabetes/Glucose Control    • Glucose maintained within prescribed range Progressing        GASTROINTESTINAL - ADULT    • Minimal or absence of nausea and vomiting Progressing    • Maintains or returns to baseline bowel function Progressing        PAIN - A

## 2020-06-30 ENCOUNTER — PATIENT OUTREACH (OUTPATIENT)
Dept: FAMILY MEDICINE CLINIC | Facility: CLINIC | Age: 85
End: 2020-06-30

## 2020-07-28 ENCOUNTER — OFFICE VISIT (OUTPATIENT)
Dept: FAMILY MEDICINE CLINIC | Facility: CLINIC | Age: 85
End: 2020-07-28
Payer: MEDICARE

## 2020-07-28 VITALS
TEMPERATURE: 99 F | HEART RATE: 52 BPM | BODY MASS INDEX: 32.62 KG/M2 | SYSTOLIC BLOOD PRESSURE: 152 MMHG | DIASTOLIC BLOOD PRESSURE: 80 MMHG | WEIGHT: 198.19 LBS | HEIGHT: 65.55 IN | OXYGEN SATURATION: 98 %

## 2020-07-28 DIAGNOSIS — I51.7 LAE (LEFT ATRIAL ENLARGEMENT): ICD-10-CM

## 2020-07-28 DIAGNOSIS — Z23 NEED FOR VACCINATION: ICD-10-CM

## 2020-07-28 DIAGNOSIS — I35.8 AORTIC VALVE SCLEROSIS: ICD-10-CM

## 2020-07-28 DIAGNOSIS — I51.7 LVH (LEFT VENTRICULAR HYPERTROPHY): ICD-10-CM

## 2020-07-28 DIAGNOSIS — H53.9 CHANGE IN VISION: ICD-10-CM

## 2020-07-28 DIAGNOSIS — G20 PARKINSON DISEASE (HCC): ICD-10-CM

## 2020-07-28 DIAGNOSIS — R01.1 HEART MURMUR: ICD-10-CM

## 2020-07-28 DIAGNOSIS — N40.1 BENIGN PROSTATIC HYPERPLASIA WITH URINARY FREQUENCY: ICD-10-CM

## 2020-07-28 DIAGNOSIS — L28.2 PRURIGO: ICD-10-CM

## 2020-07-28 DIAGNOSIS — R35.0 BENIGN PROSTATIC HYPERPLASIA WITH URINARY FREQUENCY: ICD-10-CM

## 2020-07-28 DIAGNOSIS — Z00.00 MEDICARE ANNUAL WELLNESS VISIT, SUBSEQUENT: Primary | ICD-10-CM

## 2020-07-28 DIAGNOSIS — C44.91 BASAL CELL CARCINOMA (BCC), UNSPECIFIED SITE: ICD-10-CM

## 2020-07-28 DIAGNOSIS — M25.511 CHRONIC RIGHT SHOULDER PAIN: ICD-10-CM

## 2020-07-28 DIAGNOSIS — R73.9 HYPERGLYCEMIA: ICD-10-CM

## 2020-07-28 DIAGNOSIS — D58.2 ELEVATED HEMOGLOBIN (HCC): ICD-10-CM

## 2020-07-28 DIAGNOSIS — E78.00 HYPERCHOLESTEROLEMIA: ICD-10-CM

## 2020-07-28 DIAGNOSIS — E87.6 HYPOKALEMIA: ICD-10-CM

## 2020-07-28 DIAGNOSIS — IMO0001 ALCOHOLISM /ALCOHOL ABUSE: ICD-10-CM

## 2020-07-28 DIAGNOSIS — I51.89 DIASTOLIC DYSFUNCTION WITHOUT HEART FAILURE: ICD-10-CM

## 2020-07-28 DIAGNOSIS — I05.8 MITRAL VALVE SCLEROSIS: ICD-10-CM

## 2020-07-28 DIAGNOSIS — Z12.5 SCREENING FOR MALIGNANT NEOPLASM OF PROSTATE: ICD-10-CM

## 2020-07-28 DIAGNOSIS — I10 ESSENTIAL HYPERTENSION, BENIGN: ICD-10-CM

## 2020-07-28 DIAGNOSIS — N40.0 ENLARGED PROSTATE: ICD-10-CM

## 2020-07-28 DIAGNOSIS — Z85.038 HISTORY OF COLON CANCER: ICD-10-CM

## 2020-07-28 DIAGNOSIS — I51.9 ASYMPTOMATIC LV DYSFUNCTION: ICD-10-CM

## 2020-07-28 DIAGNOSIS — M19.019 GLENOHUMERAL ARTHRITIS: ICD-10-CM

## 2020-07-28 DIAGNOSIS — R35.1 NOCTURIA: ICD-10-CM

## 2020-07-28 DIAGNOSIS — G89.29 CHRONIC RIGHT SHOULDER PAIN: ICD-10-CM

## 2020-07-28 PROBLEM — E11.9 DIABETES MELLITUS (HCC): Status: ACTIVE | Noted: 2020-07-28

## 2020-07-28 PROBLEM — K56.609 SBO (SMALL BOWEL OBSTRUCTION) (HCC): Status: RESOLVED | Noted: 2018-03-30 | Resolved: 2020-07-28

## 2020-07-28 PROCEDURE — 90732 PPSV23 VACC 2 YRS+ SUBQ/IM: CPT | Performed by: FAMILY MEDICINE

## 2020-07-28 PROCEDURE — G0439 PPPS, SUBSEQ VISIT: HCPCS | Performed by: FAMILY MEDICINE

## 2020-07-28 PROCEDURE — 3077F SYST BP >= 140 MM HG: CPT | Performed by: FAMILY MEDICINE

## 2020-07-28 PROCEDURE — G0009 ADMIN PNEUMOCOCCAL VACCINE: HCPCS | Performed by: FAMILY MEDICINE

## 2020-07-28 PROCEDURE — 96160 PT-FOCUSED HLTH RISK ASSMT: CPT | Performed by: FAMILY MEDICINE

## 2020-07-28 PROCEDURE — 99397 PER PM REEVAL EST PAT 65+ YR: CPT | Performed by: FAMILY MEDICINE

## 2020-07-28 PROCEDURE — 3079F DIAST BP 80-89 MM HG: CPT | Performed by: FAMILY MEDICINE

## 2020-07-28 PROCEDURE — 3008F BODY MASS INDEX DOCD: CPT | Performed by: FAMILY MEDICINE

## 2020-07-28 RX ORDER — AMLODIPINE BESYLATE 10 MG/1
10 TABLET ORAL DAILY
Qty: 90 TABLET | Refills: 3 | Status: ON HOLD | OUTPATIENT
Start: 2020-07-28 | End: 2020-10-18

## 2020-07-28 NOTE — PATIENT INSTRUCTIONS
-When you restart the amlodipine, take one half tab nightly for 4 nights, then go ahead and start taking the full tablet nightly.             Armaan Chaparro's SCREENING SCHEDULE   Tests on this list are recommended by your physician bu to Medicare Visit    Abdominal aortic aneurysm screening (once between ages 73-68)  No results found for this or any previous visit.  Limited to patients who meet one of the following criteria:   • Men who are 73-68 years old and have smoked more than 100 c Once after 65 No orders found for this or any previous visit. Please get once after your 65th birthday    Hepatitis B for Moderate/High Risk No orders found for this or any previous visit.  Medium/high risk factors:   End-stage renal disease   Hemophiliacs

## 2020-07-28 NOTE — PROGRESS NOTES
HPI:   Charis Kate is a 80year old male who presents for a MA (Medicare Advantage) [facility] (Once per calendar year).              Fall/Risk Assessment   He has been screened for Falls and is low risk: Fall/Risk Scorin    Cognitive Assessmen Years: 67.00        Pack years: 16.75        Types: Cigarettes        Quit date: 3/10/2015        Years since quittin.4      Smokeless tobacco: Never Used         CAGE Alcohol screening   Anna Posey was screened for Alcohol abuse and had a sc amLODIPine Besylate 10 MG Oral Tab, Take 1 tablet (10 mg total) by mouth daily. triamcinolone acetonide 0.1 % External Cream, Apply topically 2 (two) times daily as needed.        MEDICAL INFORMATION:   He  has a past medical history of Alcoholism Genesis Velázquez skin cancers, basal cell carcinoma, removed. EYES: Several days ago noticed abrupt decreased vision of both eyes and possible double vision.   HEENT: denies nasal congestion, sinus pain or ST  LUNGS: denies shortness of breath with exertion  CARDIOVASCULAR intact.             Vaccination History     Immunization History   Administered Date(s) Administered   • >=3 YRS FLUZONE OR FLUARIX QUAD PRESERVE FREE SINGLE DOSE (03816) FLU CLINIC 09/24/2015   • FLU VACC High Dose 72 YRS & Older PRSV Free (53582) 09/27/20 with dermatologist, Dr. Hermelinda Bailey, who has treated patient for multiple skin cancers and actinic keratosis. 6. Essential hypertension, benign  Uncontrolled off of antihypertensive medication.   Restart amlodipine 10 mg tablet nightly by taking a half a tab Enlarged prostate  21. Nocturia  Recommend follow-up with urologist.    22. Glenohumeral arthritis - right  23. Chronic right shoulder pain  Okay to take Tylenol when having flares of pain. May use topical ice or heat whichever feels better.     24. Histor describe your daily physical activity?: Moderate  How would you describe your current health state?: Good  How do you maintain positive mental well-being?: Games    This section provided for quick review of chart, separate sheet to patient  PREVENTATIVE SE risk factors:   End-stage renal disease   Hemophiliacs who received Factor VIII or IX concentrates   Clients of institutions for the mentally retarded   Persons who live in the same house as a HepB virus carrier   Homosexual men   Illicit injectable drug a

## 2020-08-04 PROBLEM — R21 RASH: Status: RESOLVED | Noted: 2018-02-20 | Resolved: 2020-08-04

## 2020-08-04 PROBLEM — H53.9 CHANGE IN VISION: Status: ACTIVE | Noted: 2020-08-04

## 2020-08-04 PROBLEM — K43.2 VENTRAL INCISIONAL HERNIA: Status: RESOLVED | Noted: 2017-11-02 | Resolved: 2020-08-04

## 2020-08-04 PROBLEM — Z98.890 S/P REPAIR OF VENTRAL HERNIA: Status: RESOLVED | Noted: 2018-03-30 | Resolved: 2020-08-04

## 2020-08-04 PROBLEM — D72.829 LEUKOCYTOSIS, UNSPECIFIED TYPE: Status: RESOLVED | Noted: 2018-03-30 | Resolved: 2020-08-04

## 2020-08-04 PROBLEM — K43.9 VENTRAL HERNIA WITHOUT OBSTRUCTION OR GANGRENE: Status: RESOLVED | Noted: 2018-03-21 | Resolved: 2020-08-04

## 2020-08-04 PROBLEM — T81.89XA SUTURE GRANULOMA: Status: RESOLVED | Noted: 2017-10-03 | Resolved: 2020-08-04

## 2020-08-04 PROBLEM — L30.9 DERMATITIS: Status: RESOLVED | Noted: 2018-10-26 | Resolved: 2020-08-04

## 2020-08-04 PROBLEM — I05.8 MITRAL VALVE SCLEROSIS: Status: ACTIVE | Noted: 2020-08-04

## 2020-08-04 PROBLEM — Z87.19 S/P REPAIR OF VENTRAL HERNIA: Status: RESOLVED | Noted: 2018-03-30 | Resolved: 2020-08-04

## 2020-08-04 PROBLEM — C44.91 BASAL CELL CARCINOMA (BCC): Status: ACTIVE | Noted: 2020-08-04

## 2020-08-04 PROBLEM — R32 ABSENCE OF BLADDER CONTINENCE: Status: RESOLVED | Noted: 2018-04-03 | Resolved: 2020-08-04

## 2020-08-04 PROBLEM — Z85.038 HISTORY OF COLON CANCER: Status: ACTIVE | Noted: 2020-08-04

## 2020-08-04 PROBLEM — Z85.038 HISTORY OF MALIGNANT NEOPLASM OF COLON: Status: RESOLVED | Noted: 2017-10-03 | Resolved: 2020-08-04

## 2020-08-04 PROBLEM — L28.2 PRURIGO: Status: ACTIVE | Noted: 2020-08-04

## 2020-08-05 ENCOUNTER — TELEPHONE (OUTPATIENT)
Dept: FAMILY MEDICINE CLINIC | Facility: CLINIC | Age: 85
End: 2020-08-05

## 2020-08-10 DIAGNOSIS — L28.2 PRURIGO: ICD-10-CM

## 2020-08-10 NOTE — TELEPHONE ENCOUNTER
Pt requesting refill of TRIAMCINOLONE ACETONIDE 0.1 % External Cream    Last Time Medication was Filled:  7/28/20    Last Office Visit with Provider: 7/28/20    No future appointments.

## 2020-08-11 ENCOUNTER — TELEPHONE (OUTPATIENT)
Dept: NEUROLOGY | Facility: CLINIC | Age: 85
End: 2020-08-11

## 2020-08-11 NOTE — TELEPHONE ENCOUNTER
Left message with Kobojo Camera that pt did not show for his appt on 8/11/20. She stated will contact pt to rs appt.

## 2020-08-11 NOTE — TELEPHONE ENCOUNTER
Refill for triamcinolone approved. However, if rash persists, recommend patient see his dermatologist for this persistent rash.

## 2020-10-16 ENCOUNTER — APPOINTMENT (OUTPATIENT)
Dept: CT IMAGING | Facility: HOSPITAL | Age: 85
End: 2020-10-16
Attending: EMERGENCY MEDICINE
Payer: MEDICARE

## 2020-10-16 ENCOUNTER — HOSPITAL ENCOUNTER (OUTPATIENT)
Facility: HOSPITAL | Age: 85
Setting detail: OBSERVATION
Discharge: HOME | End: 2020-10-18
Attending: EMERGENCY MEDICINE | Admitting: HOSPITALIST
Payer: MEDICARE

## 2020-10-16 DIAGNOSIS — R11.2 NAUSEA AND VOMITING IN ADULT: Primary | ICD-10-CM

## 2020-10-16 DIAGNOSIS — R10.9 ABDOMINAL PAIN OF UNKNOWN ETIOLOGY: ICD-10-CM

## 2020-10-16 DIAGNOSIS — I10 ESSENTIAL HYPERTENSION, BENIGN: ICD-10-CM

## 2020-10-16 PROCEDURE — 99220 INITIAL OBSERVATION CARE,LEVL III: CPT | Performed by: HOSPITALIST

## 2020-10-16 PROCEDURE — 74177 CT ABD & PELVIS W/CONTRAST: CPT | Performed by: EMERGENCY MEDICINE

## 2020-10-16 RX ORDER — ONDANSETRON 2 MG/ML
4 INJECTION INTRAMUSCULAR; INTRAVENOUS ONCE
Status: COMPLETED | OUTPATIENT
Start: 2020-10-16 | End: 2020-10-16

## 2020-10-16 RX ORDER — HYDROMORPHONE HYDROCHLORIDE 1 MG/ML
0.5 INJECTION, SOLUTION INTRAMUSCULAR; INTRAVENOUS; SUBCUTANEOUS EVERY 30 MIN PRN
Status: DISCONTINUED | OUTPATIENT
Start: 2020-10-16 | End: 2020-10-17

## 2020-10-17 PROBLEM — R10.9 ABDOMINAL PAIN OF UNKNOWN ETIOLOGY: Status: ACTIVE | Noted: 2020-10-17

## 2020-10-17 PROCEDURE — 99203 OFFICE O/P NEW LOW 30 MIN: CPT | Performed by: SURGERY

## 2020-10-17 PROCEDURE — 99225 SUBSEQUENT OBSERVATION CARE: CPT | Performed by: INTERNAL MEDICINE

## 2020-10-17 RX ORDER — AMLODIPINE BESYLATE 5 MG/1
10 TABLET ORAL DAILY
Status: DISCONTINUED | OUTPATIENT
Start: 2020-10-18 | End: 2020-10-17

## 2020-10-17 RX ORDER — ONDANSETRON 2 MG/ML
4 INJECTION INTRAMUSCULAR; INTRAVENOUS EVERY 6 HOURS PRN
Status: DISCONTINUED | OUTPATIENT
Start: 2020-10-17 | End: 2020-10-18

## 2020-10-17 RX ORDER — AMLODIPINE BESYLATE 5 MG/1
10 TABLET ORAL DAILY
Status: DISCONTINUED | OUTPATIENT
Start: 2020-10-17 | End: 2020-10-18

## 2020-10-17 RX ORDER — LABETALOL HYDROCHLORIDE 5 MG/ML
10 INJECTION, SOLUTION INTRAVENOUS EVERY 4 HOURS PRN
Status: DISCONTINUED | OUTPATIENT
Start: 2020-10-17 | End: 2020-10-17

## 2020-10-17 RX ORDER — HYDROMORPHONE HYDROCHLORIDE 1 MG/ML
0.8 INJECTION, SOLUTION INTRAMUSCULAR; INTRAVENOUS; SUBCUTANEOUS EVERY 2 HOUR PRN
Status: DISCONTINUED | OUTPATIENT
Start: 2020-10-17 | End: 2020-10-18

## 2020-10-17 RX ORDER — HYDROMORPHONE HYDROCHLORIDE 1 MG/ML
0.2 INJECTION, SOLUTION INTRAMUSCULAR; INTRAVENOUS; SUBCUTANEOUS EVERY 2 HOUR PRN
Status: DISCONTINUED | OUTPATIENT
Start: 2020-10-17 | End: 2020-10-18

## 2020-10-17 RX ORDER — METOCLOPRAMIDE HYDROCHLORIDE 5 MG/ML
10 INJECTION INTRAMUSCULAR; INTRAVENOUS EVERY 8 HOURS PRN
Status: DISCONTINUED | OUTPATIENT
Start: 2020-10-17 | End: 2020-10-18

## 2020-10-17 RX ORDER — SODIUM CHLORIDE 9 MG/ML
INJECTION, SOLUTION INTRAVENOUS CONTINUOUS
Status: ACTIVE | OUTPATIENT
Start: 2020-10-17 | End: 2020-10-17

## 2020-10-17 RX ORDER — SODIUM CHLORIDE 9 MG/ML
INJECTION, SOLUTION INTRAVENOUS CONTINUOUS
Status: DISCONTINUED | OUTPATIENT
Start: 2020-10-17 | End: 2020-10-18

## 2020-10-17 RX ORDER — HEPARIN SODIUM 5000 [USP'U]/ML
5000 INJECTION, SOLUTION INTRAVENOUS; SUBCUTANEOUS EVERY 8 HOURS SCHEDULED
Status: DISCONTINUED | OUTPATIENT
Start: 2020-10-17 | End: 2020-10-18

## 2020-10-17 RX ORDER — HYDROMORPHONE HYDROCHLORIDE 1 MG/ML
0.4 INJECTION, SOLUTION INTRAMUSCULAR; INTRAVENOUS; SUBCUTANEOUS EVERY 2 HOUR PRN
Status: DISCONTINUED | OUTPATIENT
Start: 2020-10-17 | End: 2020-10-18

## 2020-10-17 RX ORDER — HYDRALAZINE HYDROCHLORIDE 20 MG/ML
10 INJECTION INTRAMUSCULAR; INTRAVENOUS EVERY 4 HOURS PRN
Status: DISCONTINUED | OUTPATIENT
Start: 2020-10-17 | End: 2020-10-18

## 2020-10-17 RX ORDER — BISACODYL 10 MG
10 SUPPOSITORY, RECTAL RECTAL DAILY
Status: DISCONTINUED | OUTPATIENT
Start: 2020-10-17 | End: 2020-10-18

## 2020-10-17 NOTE — CONSULTS
[facility]  Report of Consultation    Song Grigsby Patient Status:  Observation    1935 MRN [id number]   [facility] 3SW-A Attending Collette Liu DO   Hosp Day # 0 PCP Lyndsay Qureshi DO     Requesting Physician:  ROSITA 9-    chronic, inactive   • BPH (benign prostatic hyperplasia)    • [facility]     skin cancer ? kind   • Cataract     HAD SURGERY- ??HAS LENS IMPLANTS   • Colon cancer ([facility] 2010   • Colon polyps 9-   • Dermatitis 10/26/2018    New.  Right • Stroke Neg       reports that he quit smoking about 5 years ago. His smoking use included cigarettes. He has a 16.75 pack-year smoking history. He has never used smokeless tobacco. He reports previous alcohol use. He reports that he does not use drugs. swelling  Gastrointestinal: See history of present illness  Endocrine: No polydipsia, No polyuria  Genitoruinary: No dysuria, No Hematuria  Musculoskeletal: No joint pain, No muscle pain  Neurologic: No dizzyness, No syncope, No headaches, No numbness  Hem --    ALT 10*  --    BILT 0.5  --    TP 8.0  --          No results for input(s): PTP, INR, PTT in the last 168 hours. Ct Abdomen+pelvis(contrast Only)(cpt=74177)    Result Date: 10/16/2020  CONCLUSION:  1.  There are fluid-filled loops of small bowel pelvis which is likely asymptomatic due to pain and tenderness in the left upper abdomen. 4. We will add bowel regimen. 5. He may start clear liquid diet.   6. Consult gastroenterology for evaluation of left-sided abdominal pain possible upper endoscopy

## 2020-10-17 NOTE — PROGRESS NOTES
[facility]  Progress Note     [facility] [place]:  Observation    1935 MRN [id number]   [facility] 3SW-A Attending Tawana Iverson DO   Hosp Day # 0 PCP Reina Helton DO     Chief Complaint: abdominal pa results for input(s): PTP, INR in the last 168 hours. No results for input(s): TROP, CK in the last 168 hours. Imaging: Imaging data reviewed in Epic.     Medications:   • Heparin Sodium (Porcine)  5,000 Units Subcutaneous Q8H [address]       ASSESSMEN

## 2020-10-17 NOTE — ED PROVIDER NOTES
Patient Seen in: [facility] Emergency Department      History   Patient presents with:  Abdomen/Flank Pain    Stated Complaint: abd pain     HPI    27-year-old  male presents emergency room today for plan of abdominal pain.   Patient has a pr adenoma 9-   • Ventral hernia without obstruction or gangrene 3/21/2018   • Ventral incisional hernia 11/2/2017              Past Surgical History:   Procedure Laterality Date   • APPENDECTOMY     • ARTHROSCOPY OF JOINT UNLISTED Bilateral     SURGER stable he is afebrile    Head is normocephalic atraumatic conjunctiva is clear. Sclerae anicteric. Neck is supple. Lungs are clear to auscultation bilaterally.   Heart is regular rate and rhythm with a holosystolic murmur but no gallop or rub    Abdome cholelithiasis is noted. No gallbladder wall thickening or pericholecystic inflammation is identified. There is no biliary ductal dilatation. PANCREAS:  There is moderate atrophy of the pancreas. No focal mass or ductal dilatation.   SPLEEN:  No enlarge changes are seen at both lung bases. Calcifications are seen at the aortic root. OTHER:  Negative.                     =====  CONCLUSION:    1. There are fluid-filled loops of small bowel without wall thickening. A transition zone is not identified.   Bran Catalan Admission  Date Reviewed: 7/28/2020          ICD-10-CM Noted POA    Nausea and vomiting in adult R11.2 10/16/2020 Unknown

## 2020-10-17 NOTE — PLAN OF CARE
Er admit w/ gastroenteritits vs SBO, Pt is AAOX4, room air, IVF, voiding freely, frequently (BPH), speaks only Yi, NPO now for bowel rest, no nausea or vomiting this shift, per Pt pain much better now, all needs met, juan light within reach, will CTM.

## 2020-10-17 NOTE — H&P
[facility]  History and Physical     Calderon Mohr Patient Status:  Observation    1935 MRN [id number]   [facility] 3SW-A Attending Leeann Lara MD   Hosp Day # 0 PCP Diogenes Vargas DO     Chief Complaint: Abdominal shoulder   • Other and unspecified hyperlipidemia    • Parkinson disease ([facility] 2014   • Personal history of antineoplastic chemotherapy     2010   • PONV (postoperative nausea and vomiting)    • Retained suture 9/29/2015   • S/P repair of ventral hernia 3/ Dihydrochloride (XYZAL) 5 MG Oral Tab, Take 1 tablet (5 mg total) by mouth every evening.  (Patient not taking: Reported on 7/28/2020 ), Disp: 90 tablet, Rfl: 0    •  clotrimazole-betamethasone 1-0.05 % External Cream, Apply to affected areas twice a day an in the last 168 hours. Imaging: Imaging data reviewed in Epic. ASSESSMENT / PLAN:     1. Abdominal pain- Possible ileus vs. Early SBO. Will treat conservatively with IVF, bowel rest, and IV pain meds. General surgery on consult.   2. Hypertension- W

## 2020-10-17 NOTE — PLAN OF CARE
POC, NPO status, fall prevention, use of call light, orientation to unit, pain control, medication/side effects discussed at length with patient via qualified hospital employee.  Will continue to monitor BP, will recheck again after administration of pain m

## 2020-10-17 NOTE — ED INITIAL ASSESSMENT (HPI)
Patient here with c/o left sided abdominal pain for 3 days along with 2 episodes of diarrhea.   Denies fever and N/V

## 2020-10-18 VITALS
TEMPERATURE: 98 F | BODY MASS INDEX: 32.14 KG/M2 | WEIGHT: 200 LBS | HEIGHT: 66 IN | OXYGEN SATURATION: 94 % | SYSTOLIC BLOOD PRESSURE: 146 MMHG | DIASTOLIC BLOOD PRESSURE: 57 MMHG | RESPIRATION RATE: 18 BRPM | HEART RATE: 57 BPM

## 2020-10-18 PROCEDURE — 99217 OBSERVATION CARE DISCHARGE: CPT | Performed by: INTERNAL MEDICINE

## 2020-10-18 PROCEDURE — 99225 SUBSEQUENT OBSERVATION CARE: CPT | Performed by: SURGERY

## 2020-10-18 RX ORDER — LISINOPRIL 20 MG/1
20 TABLET ORAL DAILY
Status: DISCONTINUED | OUTPATIENT
Start: 2020-10-18 | End: 2020-10-18

## 2020-10-18 RX ORDER — LISINOPRIL 20 MG/1
20 TABLET ORAL DAILY
Qty: 20 TABLET | Refills: 0 | Status: SHIPPED | OUTPATIENT
Start: 2020-10-19 | End: 2020-10-22

## 2020-10-18 RX ORDER — POLYETHYLENE GLYCOL 3350 17 G/17G
17 POWDER, FOR SOLUTION ORAL DAILY
Qty: 1 EACH | Refills: 0 | Status: SHIPPED | OUTPATIENT
Start: 2020-10-18 | End: 2020-12-30

## 2020-10-18 RX ORDER — OMEPRAZOLE 40 MG/1
40 CAPSULE, DELAYED RELEASE ORAL DAILY
Qty: 30 CAPSULE | Refills: 0 | Status: SHIPPED | OUTPATIENT
Start: 2020-10-18 | End: 2020-10-22 | Stop reason: ALTCHOICE

## 2020-10-18 RX ORDER — AMLODIPINE BESYLATE 10 MG/1
10 TABLET ORAL DAILY
Qty: 30 TABLET | Refills: 3 | Status: SHIPPED | OUTPATIENT
Start: 2020-10-18 | End: 2020-11-10

## 2020-10-18 RX ORDER — POLYETHYLENE GLYCOL 3350 17 G/17G
17 POWDER, FOR SOLUTION ORAL DAILY
Status: DISCONTINUED | OUTPATIENT
Start: 2020-10-18 | End: 2020-10-18

## 2020-10-18 NOTE — PLAN OF CARE
ER admit w/ gastroenteritits early signs of SBO, Pt is AAOX4, room air, IVF, voiding freely, frequently (BPH), speaks only Hungarian, diet advanced to clears, advance per MD, no nausea or vomiting this shift, bowel sounds improved, Pt reports 2 BM's 10/17, p

## 2020-10-18 NOTE — PLAN OF CARE
Patient cleared for discharge from all services. Updated and in agreement with POC and DC plan. AVS discussed in detail with patient and family Salma Pratt, all questions answered.  All instructions discussed with writer and qualified hospital employee

## 2020-10-18 NOTE — PROGRESS NOTES
[facility]  Progress Note    Gerhard Donald Patient Status:  Observation    1935 MRN [id number]   [facility] 3SW-A Attending Barbara Siddiqi DO   Hosp Day # 0 PCP Sandeep Cortes DO     Subjective:   The patient states t adult     Abdominal pain of unknown etiology     Hypertensive urgency      Left upper quadrant abdominal pain  Constipation    Plan:  1. Continue bowel regimen as needed to avoid constipation  2. Continue clear liquid diet  3. GI consult pending  4.  Await

## 2020-10-18 NOTE — CONSULTS
[facility]                       [facility]astroenterology Consultation-[facility]    Ray [facility] Patient Status:  Observation    1935 MRN [id number]   [facility] 3SW-A Attending Elidia Moe, hyperlipidemia    • Parkinson disease ([facility] 2014   • Personal history of antineoplastic chemotherapy     2010   • PONV (postoperative nausea and vomiting)    • Retained suture 9/29/2015   • S/P repair of ventral hernia 3/30/2018   • SBO (small bowel obstru (APRESOLINE) injection 10 mg, 10 mg, Intravenous, Q4H PRN    •  bisacodyl (DULCOLAX) rectal suppository 10 mg, 10 mg, Rectal, Daily    •  amLODIPine Besylate (NORVASC) tab 10 mg, 10 mg, Oral, Daily    •  [COMPLETED] sodium chloride 0.9% IV bolus 1,000 mL, no history of seizure, stroke, or frequent headaches      PE: /59 (BP Location: Left arm)   Pulse 59   Temp 98 °F (36.7 °C) (Oral)   Resp 19   Ht 66\"   Wt 200 lb (90.7 kg)   SpO2 94%   BMI 32.28 kg/m²     Gen: AAO x 3, NAD     HENT: NCAT, EOMI, PERR

## 2020-10-18 NOTE — DISCHARGE SUMMARY
[facility]OSPITAL[facility]  DISCHARGE SUMMARY     Celena Leonard Patient Status:  Observation    1935 MRN [id number]   [facility] 3SW-A Attending Saniya Molina DO   Hosp Day # 0 PCP Demetra Gómez DO     Date of Admission: 10/16/ 29-58:  Moderate Risk of readmission after discharge from the hospital.     Procedures during hospitalization:   • none    Incidental or significant findings and recommendations (brief descriptions):  • none    Lab/Test results pending at Discharge:   · non [address]        Appointments for Next 30 Days 10/18/2020 - 11/17/2020    None          Vital signs:  Temp:  [97.5 °F (36.4 °C)-98.3 °F (36.8 °C)] 97.5 °F (36.4 °C)  Pulse:  Konrad Tinsley

## 2020-10-18 NOTE — PROGRESS NOTES
Assumed care at this time,alert and oriented,B/P monitored. PRN medication as needed. up with standby assist,frequent voiding. Abdomen round,distended. No nausea or vomiting. Will continue to monitor. GI MD to see this am.

## 2020-10-18 NOTE — PROGRESS NOTES
[facility]  Progress Note     Emilio Noble Patient Status:  Observation    1935 MRN [id number]   [facility] 3SW-A Attending Maureen Roberson DO   Hosp Day # 0 PCP Hawa Molina DO     Chief Complaint: abdominal pa in the last 168 hours. No results for input(s): TROP, CK in the last 168 hours. Imaging: Imaging data reviewed in Epic.     Medications:   • Heparin Sodium (Porcine)  5,000 Units Subcutaneous Q8H [address]   • bisacodyl  10 mg Rectal Daily   • amLODIPi

## 2020-10-18 NOTE — PLAN OF CARE
Message received that daughter Henok Dee called requesting that writer return call. Call placed to number on file, no answer HIPPA compliant message left with return number no PHI provided on VM.  Return call received from Maricruz Rico asking i

## 2020-10-19 ENCOUNTER — HOSPITAL ENCOUNTER (EMERGENCY)
Facility: HOSPITAL | Age: 85
Discharge: HOME | End: 2020-10-19
Attending: EMERGENCY MEDICINE
Payer: MEDICARE

## 2020-10-19 ENCOUNTER — TELEPHONE (OUTPATIENT)
Dept: FAMILY MEDICINE CLINIC | Facility: CLINIC | Age: 85
End: 2020-10-19

## 2020-10-19 VITALS
SYSTOLIC BLOOD PRESSURE: 157 MMHG | BODY MASS INDEX: 31.39 KG/M2 | RESPIRATION RATE: 17 BRPM | TEMPERATURE: 98 F | HEART RATE: 58 BPM | HEIGHT: 67 IN | DIASTOLIC BLOOD PRESSURE: 68 MMHG | OXYGEN SATURATION: 96 % | WEIGHT: 200 LBS

## 2020-10-19 DIAGNOSIS — R10.9 ABDOMINAL PAIN, LEFT LATERAL: ICD-10-CM

## 2020-10-19 DIAGNOSIS — B02.9 HERPES ZOSTER WITHOUT COMPLICATION: Primary | ICD-10-CM

## 2020-10-19 PROCEDURE — 99284 EMERGENCY DEPT VISIT MOD MDM: CPT

## 2020-10-19 PROCEDURE — 96374 THER/PROPH/DIAG INJ IV PUSH: CPT

## 2020-10-19 RX ORDER — ONDANSETRON 2 MG/ML
4 INJECTION INTRAMUSCULAR; INTRAVENOUS ONCE
Status: DISCONTINUED | OUTPATIENT
Start: 2020-10-19 | End: 2020-10-19

## 2020-10-19 RX ORDER — KETOROLAC TROMETHAMINE 30 MG/ML
15 INJECTION, SOLUTION INTRAMUSCULAR; INTRAVENOUS ONCE
Status: DISCONTINUED | OUTPATIENT
Start: 2020-10-19 | End: 2020-10-19

## 2020-10-19 RX ORDER — SODIUM CHLORIDE 9 MG/ML
INJECTION, SOLUTION INTRAVENOUS CONTINUOUS
Status: DISCONTINUED | OUTPATIENT
Start: 2020-10-19 | End: 2020-10-19

## 2020-10-19 RX ORDER — KETOROLAC TROMETHAMINE 30 MG/ML
15 INJECTION, SOLUTION INTRAMUSCULAR; INTRAVENOUS ONCE
Status: COMPLETED | OUTPATIENT
Start: 2020-10-19 | End: 2020-10-19

## 2020-10-19 RX ORDER — VALACYCLOVIR HYDROCHLORIDE 1 G/1
1 TABLET, FILM COATED ORAL 3 TIMES DAILY
Qty: 21 TABLET | Refills: 0 | Status: SHIPPED | OUTPATIENT
Start: 2020-10-19 | End: 2020-10-26

## 2020-10-19 RX ORDER — ACETAMINOPHEN AND CODEINE PHOSPHATE 300; 30 MG/1; MG/1
1-2 TABLET ORAL EVERY 6 HOURS PRN
Qty: 10 TABLET | Refills: 0 | Status: SHIPPED | OUTPATIENT
Start: 2020-10-19 | End: 2020-10-22

## 2020-10-19 RX ORDER — DOCUSATE SODIUM 100 MG/1
100 CAPSULE, LIQUID FILLED ORAL 2 TIMES DAILY
Qty: 60 CAPSULE | Refills: 0 | Status: SHIPPED | OUTPATIENT
Start: 2020-10-19 | End: 2020-11-18

## 2020-10-19 NOTE — TELEPHONE ENCOUNTER
Appointment has been made with Dr. Talib Pretty for tomorrow at [address] [place] [address].  Please advise if I should reach out to GI.

## 2020-10-19 NOTE — ED PROVIDER NOTES
Patient Seen in: [facility] Emergency Department      History   Patient presents with:  Abdomen/Flank Pain    Stated Complaint: ABD PAIN, JUST D/C Hien Schwarz    HPI    59-year-old male presents emergency room with chief complaint of left-sided history of antineoplastic chemotherapy     2010   • PONV (postoperative nausea and vomiting)    • Retained suture 9/29/2015   • S/P repair of ventral hernia 3/30/2018   • [facility] obstruction) [facility] 3/30/2018   • Shoulder injury 1987    left    • S °C)   Resp 16   Ht 170.2 cm (5' 7\")   Wt 90.7 kg   SpO2 95%   BMI 31.32 kg/m²         Physical Exam    GENERAL: Patient is awake, alert, well-appearing, in no acute distress. HEENT:  no scleral icterus.   Mucous membranes are moist, oropharynx is clear, u daily for 7 days. Qty: 21 tablet Refills: 0    Acetaminophen-Codeine #3 300-30 MG Oral Tab  Take 1-2 tablets by mouth every 6 (six) hours as needed for Pain.   Qty: 10 tablet Refills: 0    docusate sodium 100 MG Oral Cap  Take 1 capsule (100 mg total) by m

## 2020-10-19 NOTE — TELEPHONE ENCOUNTER
Spoke to granddaughter. She said they were told that he has gallstones or stomach ulcers. They gave him a suppository on Saturday and that was his last bowel movement. She said she thought a surgeon saw him but they don't know what was said.   Patient co

## 2020-10-19 NOTE — TELEPHONE ENCOUNTER
Please reach out to [facility] group surgeon Dr. Temi Gan and [facility] to assist with evaluation and recommendations for this patient who was discharged from [facility] on 10/18/2020 and went to [facility] emergency department on 10/19/2020.

## 2020-10-19 NOTE — ED INITIAL ASSESSMENT (HPI)
81 yo M, recently discharged from hospital 3pm today presetns in ER for pain in left lower abdomen started 1 hour pta. Last BM was yesterday while in the hospital. Denies nausea, vomiting. Denies fever, cough or SOB.      Pt has rash in left back that flows

## 2020-10-19 NOTE — TELEPHONE ENCOUNTER
Pt granddaughter called states pt was in [address] and he needs HFU I made one for Thursday but she states he needs to be seen sooner per the hospital. Otherwise she wants to know if Dr. Pearl Rivera wants her to take him back to the ER?

## 2020-10-20 ENCOUNTER — PATIENT OUTREACH (OUTPATIENT)
Dept: CASE MANAGEMENT | Age: 85
End: 2020-10-20

## 2020-10-20 DIAGNOSIS — R10.9 ABDOMINAL PAIN OF UNKNOWN ETIOLOGY: ICD-10-CM

## 2020-10-20 DIAGNOSIS — Z02.9 ENCOUNTERS FOR UNSPECIFIED ADMINISTRATIVE PURPOSE: ICD-10-CM

## 2020-10-20 PROCEDURE — 1111F DSCHRG MED/CURRENT MED MERGE: CPT

## 2020-10-20 NOTE — PROGRESS NOTES
Initial Post Discharge Follow Up   Discharge Date: 10/19/20  Contact Date: 10/20/2020    Consent Verification:  Assessment Completed With: Other: Granddaughter, Oziel Host Permission received per patient?  written  HIPAA Verified?   Yes    Discharge Dx: diet?  Low fiber/lot fat  - Are there any barriers to following this diet? no    Medications: Reviewed medication list.  Medications are up to date.       Current Outpatient Medications   Medication Sig Dispense Refill   • valACYclovir HCl 1 G Oral Tab Take are home, are there any needs or concerns you need addressed before your next visit with your PCP?  (DME, meds, disease concerns, Etc): No     Follow up appointments:      Your appointments     Date & Time Appointment Department [facility])    Oct 20, 2020  3

## 2020-10-22 ENCOUNTER — OFFICE VISIT (OUTPATIENT)
Dept: FAMILY MEDICINE CLINIC | Facility: CLINIC | Age: 85
End: 2020-10-22
Payer: MEDICARE

## 2020-10-22 VITALS
TEMPERATURE: 98 F | BODY MASS INDEX: 29.82 KG/M2 | RESPIRATION RATE: 16 BRPM | OXYGEN SATURATION: 98 % | WEIGHT: 190 LBS | HEIGHT: 67 IN | SYSTOLIC BLOOD PRESSURE: 140 MMHG | HEART RATE: 53 BPM | DIASTOLIC BLOOD PRESSURE: 70 MMHG

## 2020-10-22 DIAGNOSIS — I10 ESSENTIAL HYPERTENSION, BENIGN: ICD-10-CM

## 2020-10-22 DIAGNOSIS — R10.32 LEFT LOWER QUADRANT ABDOMINAL PAIN: Primary | ICD-10-CM

## 2020-10-22 DIAGNOSIS — R01.1 HEART MURMUR: ICD-10-CM

## 2020-10-22 DIAGNOSIS — I50.32 CHRONIC DIASTOLIC HEART FAILURE (HCC): ICD-10-CM

## 2020-10-22 DIAGNOSIS — B02.9 HERPES ZOSTER WITHOUT COMPLICATION: ICD-10-CM

## 2020-10-22 PROCEDURE — 99496 TRANSJ CARE MGMT HIGH F2F 7D: CPT | Performed by: FAMILY MEDICINE

## 2020-10-22 PROCEDURE — 3078F DIAST BP <80 MM HG: CPT | Performed by: FAMILY MEDICINE

## 2020-10-22 PROCEDURE — 1111F DSCHRG MED/CURRENT MED MERGE: CPT | Performed by: FAMILY MEDICINE

## 2020-10-22 PROCEDURE — 3008F BODY MASS INDEX DOCD: CPT | Performed by: FAMILY MEDICINE

## 2020-10-22 PROCEDURE — 3077F SYST BP >= 140 MM HG: CPT | Performed by: FAMILY MEDICINE

## 2020-10-22 RX ORDER — ACETAMINOPHEN AND CODEINE PHOSPHATE 300; 30 MG/1; MG/1
1-2 TABLET ORAL EVERY 6 HOURS PRN
Qty: 10 TABLET | Refills: 0 | Status: SHIPPED | OUTPATIENT
Start: 2020-10-22 | End: 2020-10-29

## 2020-10-22 RX ORDER — LISINOPRIL 20 MG/1
20 TABLET ORAL DAILY
Qty: 90 TABLET | Refills: 3 | Status: SHIPPED | OUTPATIENT
Start: 2020-10-22 | End: 2021-12-16 | Stop reason: DRUGHIGH

## 2020-10-22 NOTE — PATIENT INSTRUCTIONS
-Please schedule the echocardiogram at your earliest convenience.    -Please complete your outstanding fasting labs at [address] at your earliest convenience.   When you go to [facility] tell them DO NOT DO the following tests:  CBC  CMP  Hemoglobi (salpullido) medhat de [place]. 4) Sameera Serna en la casa [employer] las ampollas se hayan formado [facility] o [facility] y usted ya no sukhjinder contagioso. 5) [place] la medicina recetada según las instrucciones hasta acabarla.   Seguimiento  con espinoza doctor o [address] i VIH/SIDA;  · tiene cáncer, especialmente la enfermedad o linfoma de Hodgkin;  · ivania medicamentos que debilitan el sistema inmunitario. ¿Cuáles son los síntomas de la culebrilla?   · Con frecuencia, los primeros signos de la culebrilla son dolor, ardor, co · Aplique compresas de hielo o compresas frías, o sumérjase en madison franci de agua fría. Para hacer madison compresa de hielo, coloque cubitos de hielo en madison bolsa plástica que pueda cerrarse. Envuelva la bolsa en madison toalla o un paño limpio y ольга.  Claudia Sandoval muerte.    Cuándo recibir atención médica  Llame a espinoza proveedor de atención médica si tiene alguno de los siguientes síntomas:  · síntomas nuevos o síntomas que no desaparecen con el tratamiento;  · madison erupción o ampollas cerca de los ojos;  · Agueda Peguero funciona mejor y lo protege de la culebrilla colton [facility]. Consulte a espinoza proveedor de [employer] momento más adecuado para recibir la vacuna. Pregúntele qué vacuna debería recibir según espinoza edad y las afecciones de la saritha que tenga.

## 2020-10-22 NOTE — PROGRESS NOTES
HPI:    Jigar Jackson is a 80year old male here today for hospital follow up.    He was discharged from Inpatient hospital, [facility] to Home   Admission Date: 10/16/20   Discharge Date: 10/18/20    Patient went to the emergency departme patient believes this is secondary to the pain medication he was given. Pain 5/10 today. Appetite is good.     There was some confusion regarding the lisinopril, patient's daughter who is with him today stated that the patient's daughter in-law had to repair of ventral hernia (3/30/2018), SBO (small bowel obstruction) ([facility] [address] (3/30/2018), Shoulder injury (1987), Suture granuloma (10/3/2017), Tubular adenoma (9-), Ventral hernia without obstruction or gangrene (3/21/2018), and Ventral incisional her several with eschar, the area is mildly tender to soft touch.   HEENT: atraumatic, normocephalic  EYES: PERRLA, EOMI, conjunctiva are clear  NECK: supple, no adenopathy  CHEST: no chest tenderness  LUNGS: clear to auscultation posteriorly and anteriorly  CA previously on amlodipine 10 mg nightly and diuretic in the morning for treatment of his hypertension)  There was some confusion regarding the lisinopril, patient's daughter who is with him today stated that the patient's daughter in-law had told him not to

## 2020-10-23 ENCOUNTER — TELEPHONE (OUTPATIENT)
Dept: FAMILY MEDICINE CLINIC | Facility: CLINIC | Age: 85
End: 2020-10-23

## 2020-10-23 NOTE — TELEPHONE ENCOUNTER
Script called to pharmacy on [address] as requested. Called Delfina to close out script there.

## 2020-10-23 NOTE — TELEPHONE ENCOUNTER
Anil Khan is calling to get her dad's prescriptions sent to the [facility] at [facility] [place] in [place], [place] P[place] called in prescriptions yesterday to the one at [address] [place] [address] in [place], she does not want them to go there.

## 2020-10-24 PROBLEM — I50.32 CHRONIC DIASTOLIC HEART FAILURE (HCC): Status: ACTIVE | Noted: 2020-10-24

## 2020-10-24 PROBLEM — R10.32 LEFT LOWER QUADRANT ABDOMINAL PAIN: Status: ACTIVE | Noted: 2020-10-24

## 2020-10-24 PROBLEM — B02.9 HERPES ZOSTER WITHOUT COMPLICATION: Status: ACTIVE | Noted: 2020-10-24

## 2020-10-27 ENCOUNTER — TELEPHONE (OUTPATIENT)
Dept: FAMILY MEDICINE CLINIC | Facility: CLINIC | Age: 85
End: 2020-10-27

## 2020-10-27 DIAGNOSIS — R10.32 LEFT LOWER QUADRANT ABDOMINAL PAIN: Primary | ICD-10-CM

## 2020-10-27 NOTE — TELEPHONE ENCOUNTER
Specialist is out of network  Received: Jas Gabriel,     The specialist for this patient is out of network with the patient plan.      Please provide the name of an in network spe

## 2020-10-27 NOTE — TELEPHONE ENCOUNTER
Left message for patient to call back. Will advise to contact insurance for a list of in-network GI providers. We can place new referral once he picks one.

## 2020-10-28 NOTE — TELEPHONE ENCOUNTER
Granddaughter phoned and stated plan said Dr. Jelena Martin is in her plan. Referral placed. Now patients family is wanting to  Tyl #3 script to pharmacy in [facility] in [place].   Granddaughter stated a different family member must have switched i

## 2020-11-09 ENCOUNTER — TELEPHONE (OUTPATIENT)
Dept: FAMILY MEDICINE CLINIC | Facility: CLINIC | Age: 85
End: 2020-11-09

## 2020-11-09 DIAGNOSIS — I10 ESSENTIAL HYPERTENSION, BENIGN: ICD-10-CM

## 2020-11-09 NOTE — TELEPHONE ENCOUNTER
LOV 10/22/20  Herpes zoster without complication  Slowly improving. Prescription for Tylenol 3 to sparingly use when pain is severe. Patient advised to complete the full 7-day course of the Valtrex.   Potential for post herpetic neuralgia syndrome discuss

## 2020-11-09 NOTE — TELEPHONE ENCOUNTER
Pt stopped in with his daughter Judy An asking for a refill on Acetaminophen-Codeine #3 300-30 MG Oral Tab because he is still having some abdominal pain.   He will also need a refill on the rest of his meds, he is leaving for [facility] on 11/17/20 and will be

## 2020-11-10 RX ORDER — AMLODIPINE BESYLATE 10 MG/1
10 TABLET ORAL DAILY
Qty: 90 TABLET | Refills: 3 | Status: SHIPPED | OUTPATIENT
Start: 2020-11-10 | End: 2021-12-16

## 2020-11-10 NOTE — TELEPHONE ENCOUNTER
I recommend that communication be in [address] to patient's daughter who was with him at his hospital follow-up visit, whose name I am of uncertain of, and his daughter-in-law Rosalie Miramontes.     Patient should continue lisinopril which he has refills for already at

## 2020-11-11 NOTE — TELEPHONE ENCOUNTER
Relayed recommendations to daughter. She agreed with plan.    Virtual appointment confirmed for Monday 11/16/20

## 2020-11-16 ENCOUNTER — VIRTUAL PHONE E/M (OUTPATIENT)
Dept: FAMILY MEDICINE CLINIC | Facility: CLINIC | Age: 85
End: 2020-11-16
Payer: MEDICARE

## 2020-11-16 DIAGNOSIS — B02.29 POSTHERPETIC NEURALGIA: ICD-10-CM

## 2020-11-16 DIAGNOSIS — I10 ESSENTIAL HYPERTENSION, BENIGN: ICD-10-CM

## 2020-11-16 DIAGNOSIS — E78.00 HYPERCHOLESTEROLEMIA: ICD-10-CM

## 2020-11-16 DIAGNOSIS — U07.1 COVID-19 VIRUS INFECTION: Primary | ICD-10-CM

## 2020-11-16 PROCEDURE — 99443 PHONE E/M BY PHYS 21-30 MIN: CPT | Performed by: FAMILY MEDICINE

## 2020-11-16 RX ORDER — ACETAMINOPHEN AND CODEINE PHOSPHATE 300; 30 MG/1; MG/1
1-2 TABLET ORAL EVERY 8 HOURS PRN
Qty: 10 TABLET | Refills: 0 | Status: SHIPPED | OUTPATIENT
Start: 2020-11-16 | End: 2021-09-09 | Stop reason: ALTCHOICE

## 2020-11-16 NOTE — PROGRESS NOTES
Virtual/Telephone Check-In    Khalida verbally consents to a Virtual/Telephone Check-In service on 11/16/20. Patient has been referred to the [facility] website at [email or web address] to review the yearly Consent to Treat document.   Rama tablet 0   • amLODIPine Besylate 10 MG Oral Tab Take 1 tablet (10 mg total) by mouth daily. 90 tablet 3   • Omeprazole 40 MG Oral Capsule Delayed Release Take 40 mg by mouth daily. • lisinopril 20 MG Oral Tab Take 1 tablet (20 mg total) by mouth daily. 9-    chronic, inactive   • Atypical mole    • BPH (benign prostatic hyperplasia)    • Cancer ([facility]     skin cancer ? kind   • Cataract     HAD SURGERY- ??HAS LENS IMPLANTS   • [facility] ([facility] 2010   • Colon polyps 9-   • Constipation    • vision  HEENT: As in HPI  LUNGS: As in HPI  CARDIOVASCULAR: Denies chest pain or palpitations. GI: As in HPI  MUSCULOSKELETAL: Denies any new muscle aches or joint pains. NEURO: Denies headache or dizziness. PSYCH: Denies depression or anxiety.       EXA nightly. Continue lisinopril 20 mg nightly. 3. Hypercholesterolemia  Consider restart statin. 4. Postherpetic neuralgia  Recommend OTC Salonpas that has lidocaine. Prescription to sparingly use Tylenol 3 when having flares of pain.   Take MiraLAX if

## 2020-11-16 NOTE — PATIENT INSTRUCTIONS
-Do not travel until after November 30, 2020. Enfermedad del coronavirus 2019 (COVID-19): descripción general  La enfermedad del coronavirus 2019 (COVID-19) es madison afección respiratoria.  Es causada por un nuevo coronavirus (difer más sobre la COVID-19, se registran otros síntomas.  Los síntomas pueden aparecer unos 1 E 28 días después de entrar en contacto con el virus:   · Nils Divina o escalofríos  · Tos  · Dificultad para respirar o sensación de falta de aire  · Dolor de garganta  · C locales sobre cualquier persona hilario de 21 años que esté lo suficientemente enferma jorden para ser hospitalizada y tenga todos los siguientes síntomas:   · Fiebre por encima de los 100.4 °F (38.0 °C) colton más de 24 emil y Urszula Xie un resultado positivo en la COVID-19. Estas pruebas pueden incluir lo siguiente:   · Análisis de anticuerpos.  Se analizan las pruebas de anticuerpos para determinar si la persona estuvo infectada con el virus previamente y ahora podría tener anticuerpos en la [place], jorden el SARS comprobados son aquellos que ayudan al cuerpo mientras combate el virus. [place] es lo que se conoce jorden tratamiento de apoyo. El mismo incluye lo siguiente:   · Descanse. [place] ayuda al cuerpo a combatir la enfermedad. · Manténgase hidratado.  Beber líquidos COVID-19. El plasma de las personas recuperadas por completo de la COVID-19 puede contener anticuerpos que ayuden a combatir la COVID-19 en personas que se encuentren gravemente enfermas en la actualidad.  Los expertos no saben si la donación de plasma func pruebas pendientes, resultados positivos, postratamiento y donación de plasma. No hay tratamiento antiviral específico recomendado para el COVID-19. Las personas con COVID-19 deberían recibir cuidado de apoyo para ayudar a [employer].       Cualq y agua colton al menos 21 segundos o limpie eagle jo-ann con un desinfectante de mano con base de alcohol que contenga al menos un 60% de alcohol. 8. Tanto jorden sea posible, permanezca en un cuarto específico y alejado de otras personas en espinoza hogar.  Igualm y  • Hay mejora en los síntomas respiratorios (p. ej., tos, falta de aire), y  • Tejada pasado al menos 10 emil desde que aparecieron los síntomas por primera vez O si es un paciente asintomático, o si la fecha en que [employer] síntomas no está delfin, ent donar espinoza plasma para ayudar a otros diagnosticados con el virus, comuníquese directamente con [employer] en espinoza sitio web: [email or web address]    ¿Quién es elegible para donar plasma de paciente convaleciente?   [employer] al hospital o a la clínica. Siga las instrucciones de espinoza proveedor. Es posible que le aconsejen que se aísle en espinoza casa. A esto se lo llama autoaislamiento. · No se asuste. Tenga en cuenta que hay otras enfermedades que pueden causar síntomas parecidos. COVID-19? · eJnny Watts en sepinoza casa y comience el [address] No salga de casa, charly que necesite atención médica. No vaya al Martina Longest, a la escuela ni a lugares públicos. No use transporte público ni taxis.   · Siga todas las instrucciones que le haya dad evaluando si algunos medicamentos que se usan para tratar [employer] sirven para la COVID-19, robert aún no se ha aprobado ninguno para tratarla. Actualmente, el tratamiento consiste en ayudar al cuerpo mientras combate el virus.  Avis es lo que se personas con  graves de COVID-19 o con riesgo de Story. ¿Qué hacer si está cuidando a alguien enfermo?   · Siga todas las instrucciones que le dé el personal de Jas daly. · Lávese las jo-ann con frecuencia.   · Use ropa que lo [employer] esté atento a los síntomas. Si espinoza estado de saritha es normal, desde los CDC se aconseja no repetir el análisis de COVID-19 por medio de un hisopado nasofaríngeo. Puede suspender el autoaislamiento cuando se cumplan las 3 condiciones siguientes:   1.  No ha posible el proveedor de [facility] Nik diga que debe seguir las instrucciones de aislamiento de las personas que no tienen otras enfermedades. Siga las instrucciones de espinoza proveedor sobre el aislamiento y Jovany Castillo cuándo puede suspenderlo.   El regreso a l rekha  · Ritmo cardíaco irregular o acelerado  · Confusión o problemas para despertarse  · Desmayos o pérdida del conocimiento  · Tos con sean  ¿Cómo regresar a casa desde el hospital?   Si le diagnosticaron COVID-19 y fue dado de juan miguel de un hospital reci pulmones pueden funcionar mejor con menos esfuerzo. Puede ayudarlo a evitar problemas, jorden el colapso pulmonar. Aguilita ocurre [employer] no pueden inflarse o se llenan de líquido.  Puede producirse en joshua o los dos pulmones y en todo el pulmón o sol Permanezca en esta posición de 30 minutos a 2 horas. Lado derecho: Acuéstese sobre espinoza lado derecho en madison cama plana. Permanezca en esta posición de 30 minutos a 2 horas.          Incorporarse en la cama: Siéntese en la cama formando un ángulo de 30 a Racheal Santoyo. Asegúrese de seguir las instrucciones específicas de espinoza equipo de [facility].  Podrían indicarle lo siguiente:  · Cuándo colocarse en posición decúbito prono  · Con qué frecuencia hacerlo  · Con qué frecuencia cambiar de posición  Cuándo llam

## 2020-11-24 ENCOUNTER — TELEPHONE (OUTPATIENT)
Dept: FAMILY MEDICINE CLINIC | Facility: CLINIC | Age: 85
End: 2020-11-24

## 2020-11-24 PROBLEM — U07.1 COVID-19 VIRUS INFECTION: Status: ACTIVE | Noted: 2020-11-24

## 2020-11-24 NOTE — TELEPHONE ENCOUNTER
Ancelmo Vick is calling to get a refill on Benjy's parkinson's medication, he was just tested positive with Covid. And he is shaking a lot more, she is not sure if it is because of the covid, but he does need a refill sent to his pharmacy.

## 2020-11-24 NOTE — TELEPHONE ENCOUNTER
Called Russel. [phone]  It looks like patient was on carbidopa-levodopa from Dr. Felicia Garcia office last prescribed 4/16/18 90 days. Advised to call that office. She BRANDO.

## 2020-11-25 ENCOUNTER — TELEPHONE (OUTPATIENT)
Dept: FAMILY MEDICINE CLINIC | Facility: CLINIC | Age: 85
End: 2020-11-25

## 2020-11-25 NOTE — TELEPHONE ENCOUNTER
Olvin Carter called states pt was taken to the ER for his oxygen levels and [facility] has no rooms for him so he is still waiting in the ER for a room and they refuse to transfer pt to [facility] without PCP letter.  Fela Delgado wanted

## 2020-11-25 NOTE — TELEPHONE ENCOUNTER
Spoke to Jeremías hinojosa. States patient has been in [facility] since yesterday afternoon. They still do not have a bed for him. Advised, unfortunately, we can not involve ourselves as we do not have all medical reports as to what they should do.   Advised to

## 2020-11-25 NOTE — TELEPHONE ENCOUNTER
On call- liam (family member) called stating O2 84-88% and breathing faster than normal, /86, myalgia, shaking, cough, headaches, + for covid. His grandfather's quarantine ended.   Has     Rec- advised her to go to nearest hospital, which she sa

## 2020-12-07 DIAGNOSIS — G20 PARKINSON DISEASE (HCC): ICD-10-CM

## 2020-12-07 NOTE — TELEPHONE ENCOUNTER
pts daughter req refill of Carbidopa - Levodopa; pt will be switching to [facility] because he can only be seen in [facility]; pt made f/u for 1/6/21

## 2020-12-07 NOTE — TELEPHONE ENCOUNTER
Per Dr. Raymundo Rogers: not able to fill at this time, has been over 3 years since last visit. Relayed to daughter, Isabel Ba (ok per HIPAA), verbalized understanding.

## 2020-12-07 NOTE — TELEPHONE ENCOUNTER
Per Epic review, pt has not been seen in office since 4/5/2017. Spoke with daughter, Tamala Kanner (ok per HIPAA). She states that pt has been reluctant to return to the doctor's office and that is reason for the length of time since last visit.      Is ready t

## 2020-12-10 ENCOUNTER — TELEPHONE (OUTPATIENT)
Dept: FAMILY MEDICINE CLINIC | Facility: CLINIC | Age: 85
End: 2020-12-10

## 2020-12-10 NOTE — TELEPHONE ENCOUNTER
Tarun Covarrubias from [facility] is calling to see if Jayson Renee can get some kind of home health, he was at [facility] for 10 days with Covid, he was released on Friday Dec 4th, he was sent home with no home health and no home instructions from the hospital, his weak

## 2020-12-10 NOTE — TELEPHONE ENCOUNTER
Spoke with Ancelmo Vick, states patient is feeling better, still has mild cough. .  She is [facility] orders for PT/OT, wheelchair. Appointment has been moved up to 12/11/20. Will discuss request at appointment.

## 2020-12-11 ENCOUNTER — TELEMEDICINE (OUTPATIENT)
Dept: FAMILY MEDICINE CLINIC | Facility: CLINIC | Age: 85
End: 2020-12-11
Payer: MEDICARE

## 2020-12-11 VITALS — OXYGEN SATURATION: 91 % | SYSTOLIC BLOOD PRESSURE: 162 MMHG | DIASTOLIC BLOOD PRESSURE: 75 MMHG | HEART RATE: 73 BPM

## 2020-12-11 DIAGNOSIS — R53.1 GENERALIZED WEAKNESS: ICD-10-CM

## 2020-12-11 DIAGNOSIS — U07.1 PNEUMONIA DUE TO COVID-19 VIRUS: Primary | ICD-10-CM

## 2020-12-11 DIAGNOSIS — Z91.81 RISK FOR FALLS: ICD-10-CM

## 2020-12-11 DIAGNOSIS — R09.02 HYPOXIA: ICD-10-CM

## 2020-12-11 DIAGNOSIS — J12.82 PNEUMONIA DUE TO COVID-19 VIRUS: Primary | ICD-10-CM

## 2020-12-11 PROCEDURE — 99214 OFFICE O/P EST MOD 30 MIN: CPT | Performed by: FAMILY MEDICINE

## 2020-12-11 PROCEDURE — 3077F SYST BP >= 140 MM HG: CPT | Performed by: FAMILY MEDICINE

## 2020-12-11 PROCEDURE — 3078F DIAST BP <80 MM HG: CPT | Performed by: FAMILY MEDICINE

## 2020-12-11 RX ORDER — DEXAMETHASONE 6 MG/1
6 TABLET ORAL DAILY
COMMUNITY
Start: 2020-12-04 | End: 2020-12-22

## 2020-12-11 RX ORDER — HYDROCODONE BITARTRATE AND ACETAMINOPHEN 10; 325 MG/1; MG/1
TABLET ORAL AS NEEDED
COMMUNITY
Start: 2020-12-04 | End: 2020-12-22

## 2020-12-11 RX ORDER — CEFDINIR 300 MG/1
300 CAPSULE ORAL
COMMUNITY
Start: 2020-12-04 | End: 2021-09-09 | Stop reason: ALTCHOICE

## 2020-12-11 NOTE — PROGRESS NOTES
Doximity video visit with live interactive synchronous audio and video    Kalen Fernandez verbally consents to a Doximity video visit with live interactive synchronous audio and video service on 12/11/20.   Patient has been referred to the [facility] we room.    Eating less. No fever or chills since he has been home. Denies shortness of breath. Occ dry cough. Denies HA.           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

## 2020-12-14 ENCOUNTER — TELEPHONE (OUTPATIENT)
Dept: FAMILY MEDICINE CLINIC | Facility: CLINIC | Age: 85
End: 2020-12-14

## 2020-12-14 DIAGNOSIS — J12.82 PNEUMONIA DUE TO COVID-19 VIRUS: Primary | ICD-10-CM

## 2020-12-14 DIAGNOSIS — U07.1 PNEUMONIA DUE TO COVID-19 VIRUS: Primary | ICD-10-CM

## 2020-12-14 DIAGNOSIS — R53.1 GENERALIZED WEAKNESS: ICD-10-CM

## 2020-12-14 DIAGNOSIS — R09.02 HYPOXIA: ICD-10-CM

## 2020-12-14 NOTE — TELEPHONE ENCOUNTER
Please advise. Do you have a preference? [facility], DAIN Andre [facility], [address] [facility]?

## 2020-12-14 NOTE — TELEPHONE ENCOUNTER
Keith Chandler with [facility] called stated they are unfortunately unable to accept the referral for [facility] services due to staffing issues.  The daughter Sergio Anderson has been notified by the agency but is expecting a call from our office for a different referral to another agen

## 2020-12-17 ENCOUNTER — TELEPHONE (OUTPATIENT)
Dept: FAMILY MEDICINE CLINIC | Facility: CLINIC | Age: 85
End: 2020-12-17

## 2020-12-17 DIAGNOSIS — R09.02 HYPOXIA: ICD-10-CM

## 2020-12-17 DIAGNOSIS — U07.1 PNEUMONIA DUE TO COVID-19 VIRUS: Primary | ICD-10-CM

## 2020-12-17 DIAGNOSIS — J12.82 PNEUMONIA DUE TO COVID-19 VIRUS: Primary | ICD-10-CM

## 2020-12-17 NOTE — TELEPHONE ENCOUNTER
Received fax from [facility] for oxygen (fax in triage). Patient is currently receiving home health.   Left message for Evangelina Diaz [facility] RN asking if this is something they can handle or if we need to process referral.   Patient has [facility]

## 2020-12-17 NOTE — TELEPHONE ENCOUNTER
Gibran Child from [facility] is calling because she seen Lynn Cobian for the first time today, he is post covid from about a month ago, he did have pneumonia, his vitals today were fine, he is very tired and on 3 liters of oxygen his oxygen levels do decrease

## 2020-12-18 NOTE — TELEPHONE ENCOUNTER
Please call patient as planned on Monday for condition update, and continue to follow-up every day or every other day.     (Note: It is unfortunate that patient cannot be put on the [address] SARS-CoV-2/COVID-19 monitoring pool.)

## 2020-12-18 NOTE — TELEPHONE ENCOUNTER
Spoke to Jeremías hinojosa. Notified her of CXR. Stated she would take patient. States he is doing much the same. No worse. O2 sats mostly in 94-95% range. Per our  Karthik home monitoring only for discharges from THE [facility] ER or THE [facility] inpatient.

## 2020-12-18 NOTE — TELEPHONE ENCOUNTER
Ana M Hurtado is returning a call to Juan Miguel Lyman, she just wanted to let her know that the order for oxygen comes from our office and not home health, any questions or concern please call Ana M Hurtado at [phone] may leave message if no answer per Ana M Hurtado.

## 2020-12-21 NOTE — TELEPHONE ENCOUNTER
Spoke to John robles. States Lynn Cobian is doing much the same. O2 is 97% on 3 liters. She forgot to call to schedule CXR. Will call today.

## 2020-12-21 NOTE — TELEPHONE ENCOUNTER
Await CXR results. Please instruct family to immediately call 911 if there is a worsening of his condition. Please continue to do a condition update every day or every other day.

## 2020-12-22 ENCOUNTER — APPOINTMENT (OUTPATIENT)
Dept: GENERAL RADIOLOGY | Age: 85
End: 2020-12-22
Payer: MEDICARE

## 2020-12-22 ENCOUNTER — APPOINTMENT (OUTPATIENT)
Dept: GENERAL RADIOLOGY | Age: 85
End: 2020-12-22
Attending: PHYSICIAN ASSISTANT
Payer: MEDICARE

## 2020-12-22 ENCOUNTER — HOSPITAL ENCOUNTER (EMERGENCY)
Age: 85
Discharge: HOME | End: 2020-12-22
Attending: EMERGENCY MEDICINE
Payer: MEDICARE

## 2020-12-22 VITALS
HEART RATE: 75 BPM | SYSTOLIC BLOOD PRESSURE: 147 MMHG | WEIGHT: 180 LBS | HEIGHT: 66 IN | TEMPERATURE: 98 F | OXYGEN SATURATION: 98 % | RESPIRATION RATE: 16 BRPM | DIASTOLIC BLOOD PRESSURE: 60 MMHG | BODY MASS INDEX: 28.93 KG/M2

## 2020-12-22 DIAGNOSIS — M25.431 PAIN AND SWELLING OF RIGHT WRIST: Primary | ICD-10-CM

## 2020-12-22 DIAGNOSIS — M19.031 OSTEOARTHRITIS OF RIGHT WRIST, UNSPECIFIED OSTEOARTHRITIS TYPE: ICD-10-CM

## 2020-12-22 DIAGNOSIS — M25.531 PAIN AND SWELLING OF RIGHT WRIST: Primary | ICD-10-CM

## 2020-12-22 PROCEDURE — 99284 EMERGENCY DEPT VISIT MOD MDM: CPT

## 2020-12-22 PROCEDURE — 85025 COMPLETE CBC W/AUTO DIFF WBC: CPT | Performed by: PHYSICIAN ASSISTANT

## 2020-12-22 PROCEDURE — 73110 X-RAY EXAM OF WRIST: CPT | Performed by: PHYSICIAN ASSISTANT

## 2020-12-22 PROCEDURE — 73130 X-RAY EXAM OF HAND: CPT | Performed by: EMERGENCY MEDICINE

## 2020-12-22 PROCEDURE — 84550 ASSAY OF BLOOD/URIC ACID: CPT | Performed by: PHYSICIAN ASSISTANT

## 2020-12-22 PROCEDURE — 80053 COMPREHEN METABOLIC PANEL: CPT | Performed by: PHYSICIAN ASSISTANT

## 2020-12-22 PROCEDURE — 36415 COLL VENOUS BLD VENIPUNCTURE: CPT

## 2020-12-22 RX ORDER — METHYLPREDNISOLONE 4 MG/1
TABLET ORAL
Qty: 1 PACKAGE | Refills: 0 | Status: SHIPPED | OUTPATIENT
Start: 2020-12-22 | End: 2020-12-30 | Stop reason: ALTCHOICE

## 2020-12-22 NOTE — ED PROVIDER NOTES
Patient Seen in: THE [facility] Emergency Department In [place]      History   Patient presents with:  Cellulitis  Pain    Stated Complaint: pain in right hand and wrist onset yesterday,     HPI    80-year-old male here with his family member after his home h Conjunctiva/sclera: Conjunctivae normal.      Pupils: Pupils are equal, round, and reactive to light. Neck:      Musculoskeletal: Normal range of motion and neck supple. Cardiovascular:      Rate and Rhythm: Normal rate and regular rhythm.       Heart RIGHT (CPT=73130)  TECHNIQUE:  Three views were obtained.   COMPARISON:  [facility], XR, XR WRIST COMPLETE (MIN 3 VIEWS), RIGHT (CPT=73110), 12/22/2020, 1:49 PM.  INDICATIONS:  pain in right hand and wrist onset yesterday,  PATIENT STATED HISTORY: (As trans Osteoarthritic changes of 1st carpometacarpal joint.    Dictated by (CST): Archana Shah MD on 12/22/2020 at 2:32 PM     Finalized by (CST): Archana Shah MD on 12/22/2020 at 2:33 PM       Site:R wrist/hand  Device:velcro wrist splint with thumb spica  N/V

## 2020-12-23 NOTE — TELEPHONE ENCOUNTER
Patient was taken to ED yesterday for wrist/thumb pain and swelling. Harrison Mckeon states he has gout and needs to follow up with Dr. Johann Valdez. .   Requested video appointment. Next available 12/30/20    She reports his breathing is better.  O2 is between 95%-9

## 2020-12-30 ENCOUNTER — TELEPHONE (OUTPATIENT)
Dept: FAMILY MEDICINE CLINIC | Facility: CLINIC | Age: 85
End: 2020-12-30

## 2020-12-30 ENCOUNTER — TELEMEDICINE (OUTPATIENT)
Dept: FAMILY MEDICINE CLINIC | Facility: CLINIC | Age: 85
End: 2020-12-30
Payer: MEDICARE

## 2020-12-30 DIAGNOSIS — M18.11 PRIMARY OSTEOARTHRITIS OF FIRST CARPOMETACARPAL JOINT OF RIGHT HAND: ICD-10-CM

## 2020-12-30 DIAGNOSIS — J12.82 PNEUMONIA DUE TO COVID-19 VIRUS: Primary | ICD-10-CM

## 2020-12-30 DIAGNOSIS — U07.1 PNEUMONIA DUE TO COVID-19 VIRUS: Primary | ICD-10-CM

## 2020-12-30 DIAGNOSIS — R09.02 HYPOXIA: ICD-10-CM

## 2020-12-30 PROCEDURE — 99214 OFFICE O/P EST MOD 30 MIN: CPT | Performed by: FAMILY MEDICINE

## 2020-12-30 NOTE — TELEPHONE ENCOUNTER
Patient is currently on continuous oxygen, 3L. Per Loyce Simmonds, today O2 sat 97% on 3L with respirations 18 at rest, lungs sound clear, slightly diminished bilaterally.    Patient and family are eager to get him off oxygen as he wants to return [place] his home in [place]

## 2020-12-30 NOTE — PROGRESS NOTES
Doximity video visit with live interactive synchronous audio and video    Minnie Thompson verbally consents to a Doximity video visit with live interactive synchronous audio and video service on 12/30/20.   Patient has been referred to the [facility] amLODIPine Besylate 10 MG Oral Tab Take 1 tablet (10 mg total) by mouth daily. 90 tablet 3   • Omeprazole 40 MG Oral Capsule Delayed Release Take 40 mg by mouth daily. • lisinopril 20 MG Oral Tab Take 1 tablet (20 mg total) by mouth daily.  90 tablet 3 Arthritis    • Atrophic gastritis 9-    chronic, inactive   • Atypical mole    • BPH (benign prostatic hyperplasia)    • [facility]     skin cancer ? kind   • Cataract     HAD SURGERY- ??HAS LENS IMPLANTS   • [facility]   • Colon poly rashes. EYES: Denies changes in vision  HEENT: Denies sore throat or other upper respiratory symptoms. LUNGS: Cough greatly improved. CARDIOVASCULAR: Denies chest pain or palpitations.   GI: Denies abdominal pain, nausea, vomiting, or diarrhea  MUSCULOSK 7 - 18 mg/dL 18   CREATININE      0.70 - 1.30 mg/dL 0.77   BUN/CREAT Ratio      10.0 - 20.0 23.4 (H)   CALCIUM      8.5 - 10.1 mg/dL 9.1   CALCULATED OSMOLALITY      275 - 295 mOsm/kg 293   eGFR NON-AFR.  AMERICAN      >=60 83   eGFR       > nurse, patient currently on home O2 3 L nasal cannula, okay for home health nurse to wean supplemental O2 per protocol as tolerated.   Patient and patient's family strongly advised that patient should not travel out of the country at this time, patient has

## 2020-12-30 NOTE — TELEPHONE ENCOUNTER
David Laws from [facility] called and wants to know if she can titrate pt's oxygen down from 3 liters. She said he has improved.

## 2020-12-30 NOTE — TELEPHONE ENCOUNTER
Please call patient's home health provider and add on order for occupational therapy for right hand/right wrist pain and primary osteoarthritis of right first carpometacarpal joint.

## 2020-12-31 NOTE — TELEPHONE ENCOUNTER
Left detailed message with recommendations for [facility] nurse.    Advised she call back with any questions

## 2020-12-31 NOTE — TELEPHONE ENCOUNTER
Please contact home health nurse, carrie to wean supplemental oxygen. During today's telehealth visit patient was advised not to travel and to schedule follow-up visit in approximately 2 weeks.     Please inform patient and patient's family that we can pro

## 2021-01-04 ENCOUNTER — TELEPHONE (OUTPATIENT)
Dept: FAMILY MEDICINE CLINIC | Facility: CLINIC | Age: 86
End: 2021-01-04

## 2021-01-04 NOTE — TELEPHONE ENCOUNTER
Pt granddaughter called requesting a note for the airline. Pt was suppose to travel to [facility] tomorrow but since he is still on oxygen that will not be the case and the airline needs a note from Dr. Luis Merlos so the pt can get a refund for the ticket.

## 2021-01-05 ENCOUNTER — TELEPHONE (OUTPATIENT)
Dept: FAMILY MEDICINE CLINIC | Facility: CLINIC | Age: 86
End: 2021-01-05

## 2021-01-05 NOTE — TELEPHONE ENCOUNTER
Note provided and sent to [employer]. Also, printed a copy and [address] [facility] Maxwell Calloway she can  copy if needed.

## 2021-01-05 NOTE — TELEPHONE ENCOUNTER
Faxed patient home certification, OT plan of care, and PT plan of care to [facility].    Fx [phone]

## 2021-01-27 ENCOUNTER — TELEPHONE (OUTPATIENT)
Dept: FAMILY MEDICINE CLINIC | Facility: CLINIC | Age: 86
End: 2021-01-27

## 2021-01-27 NOTE — TELEPHONE ENCOUNTER
Per home health, patient was not weaned off of O2 prior to trip to [facility] on discharge from home health were: 98.4-/66 97% O2 sat on 3 Liters oxygen. Spoke with granddaughter Pooja Olivo. She states he was weaned down.   Is currently in [facility]

## 2021-01-29 DIAGNOSIS — Z23 NEED FOR VACCINATION: ICD-10-CM

## 2021-04-19 ENCOUNTER — TELEPHONE (OUTPATIENT)
Dept: CASE MANAGEMENT | Age: 86
End: 2021-04-19

## 2021-04-19 NOTE — TELEPHONE ENCOUNTER
Daughter Andi Pena informed Pt is eligible for 2021 Medicare Advantage Supervisit. , scheduled for 5/13/2021.

## 2021-05-04 ENCOUNTER — TELEPHONE (OUTPATIENT)
Dept: FAMILY MEDICINE CLINIC | Facility: CLINIC | Age: 86
End: 2021-05-04

## 2021-05-04 DIAGNOSIS — C44.91 BASAL CELL CARCINOMA (BCC), UNSPECIFIED SITE: Primary | ICD-10-CM

## 2021-05-04 NOTE — TELEPHONE ENCOUNTER
Spoke to daughter. Referral is for derm Dr. Prosper Botello. Referral placed and referral department notified since appointment is tomorrow.

## 2021-05-04 NOTE — TELEPHONE ENCOUNTER
Pt granddaughter called requesting a referral to Dr. Leonidas Joy, pt has an appt tomorrow at [phone] Lynn would like a call when referral has been entered.

## 2021-05-04 NOTE — TELEPHONE ENCOUNTER
Received message from referrals:    Ladell Ganser 28 Clinical Staff  Good Afternoon,     The following provider is out of network with patients [facility] plan.  Patient does not have out of network benefits, therefore this referral will be closed at [facility]

## 2021-05-04 NOTE — TELEPHONE ENCOUNTER
Russel called back. Asked we place referral to Dr. Ayden Maddox. Referral placed and referral dept notified.

## 2021-07-14 NOTE — TELEPHONE ENCOUNTER
Medication: Carbidopa-Levodopa  mg     Date of last refill:04/16/18  [place]      Last office visit:04/05/17  Due back to clinic per last office note:  RTN in 1 year by 04/05/18  Date next office visit scheduled: Patient/Caregiver provided printed discharge information.

## 2021-09-07 ENCOUNTER — TELEPHONE (OUTPATIENT)
Dept: FAMILY MEDICINE CLINIC | Facility: CLINIC | Age: 86
End: 2021-09-07

## 2021-09-07 DIAGNOSIS — R41.3 MEMORY LOSS: Primary | ICD-10-CM

## 2021-09-07 NOTE — TELEPHONE ENCOUNTER
Jass Alan (daughter in law) is calling to get a referral to Dr Mateo olson, she has a appt this Thursday for memory loss and confusion, she did make a appt for Friday Sept 17th with Dr Pearl Rivera, she is not on hippa form but Calvin Jobs will be living with her f

## 2021-09-09 ENCOUNTER — OFFICE VISIT (OUTPATIENT)
Dept: NEUROLOGY | Facility: CLINIC | Age: 86
End: 2021-09-09
Payer: MEDICARE

## 2021-09-09 VITALS
RESPIRATION RATE: 16 BRPM | WEIGHT: 180 LBS | BODY MASS INDEX: 28.93 KG/M2 | HEART RATE: 82 BPM | DIASTOLIC BLOOD PRESSURE: 82 MMHG | SYSTOLIC BLOOD PRESSURE: 140 MMHG | HEIGHT: 66 IN

## 2021-09-09 DIAGNOSIS — G20 PARKINSON DISEASE (HCC): Primary | ICD-10-CM

## 2021-09-09 DIAGNOSIS — R41.3 MEMORY LOSS: ICD-10-CM

## 2021-09-09 PROCEDURE — 99214 OFFICE O/P EST MOD 30 MIN: CPT | Performed by: OTHER

## 2021-09-09 PROCEDURE — 3008F BODY MASS INDEX DOCD: CPT | Performed by: OTHER

## 2021-09-09 PROCEDURE — 3077F SYST BP >= 140 MM HG: CPT | Performed by: OTHER

## 2021-09-09 PROCEDURE — 3079F DIAST BP 80-89 MM HG: CPT | Performed by: OTHER

## 2021-09-09 NOTE — PROGRESS NOTES
[facility] Progress Note    Patient presents with:  Neurologic Problem: C/O of short term memory       HPI:  As per my initial H&P from 3/6/15:  Kraig Hawk is a [de-identified]year old, with history of HTN and HL, who presents for evaluat sclerosis 2/13/2015   • Arthritis    • Atrophic gastritis 9-    chronic, inactive   • Atypical mole    • BPH (benign prostatic hyperplasia)    • [facility]     skin cancer ? 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     Gen: well developed, well nourished, no acute distress  HEENT: normocephalic  Heart; normal X5/P3, regular rate and rhythm  Lungs: clear to auscultation bilaterally  Extremities: no edema, peripheral pulses intact    Neuro: Neurology  [facility]  Pager [phone]  9/9/2021

## 2021-11-08 ENCOUNTER — TELEPHONE (OUTPATIENT)
Dept: FAMILY MEDICINE CLINIC | Facility: CLINIC | Age: 86
End: 2021-11-08

## 2021-11-08 DIAGNOSIS — C44.91 BASAL CELL CARCINOMA (BCC), UNSPECIFIED SITE: Primary | ICD-10-CM

## 2021-11-08 NOTE — TELEPHONE ENCOUNTER
Delaney Morillo is going to see Dr Carolyn Mercer in a couple of days, he needs a new referral put in because the old one is , Shay Bravo grand daughter would like a procedure of a mole put in the referral, Please call Shay Bravo at [phone]

## 2021-11-08 NOTE — TELEPHONE ENCOUNTER
Please call Kamiin Wilde, referral for dermatologist Dr. Soto Ramires approved. Patient needs appointment to see me for his super visit.

## 2021-11-08 NOTE — TELEPHONE ENCOUNTER
Patient's granddaughter calling asking for a referral to Dr. Yodit Chen for mole removal.  The patient has been seen in the past by this provider. Referral  in October.       Last office visit 10/22/2020    Referral pended for approval.

## 2021-11-15 ENCOUNTER — TELEPHONE (OUTPATIENT)
Dept: FAMILY MEDICINE CLINIC | Facility: CLINIC | Age: 86
End: 2021-11-15

## 2021-11-15 DIAGNOSIS — C44.91 BASAL CELL CARCINOMA (BCC), UNSPECIFIED SITE: Primary | ICD-10-CM

## 2021-11-15 NOTE — TELEPHONE ENCOUNTER
Regi Tovar from [facility] is calling regarding Benjy's referral to Dr Pauline Freedman, please call Regi Tovar at [phone]

## 2021-11-15 NOTE — TELEPHONE ENCOUNTER
Dawit is calling to see if Dr Lida Quinteros can put in a referral for Dr Sarah Pan, she will be the one doing the mole procedure.  Please call Jailyn Carrasco at [facility] at [phone]

## 2021-11-16 ENCOUNTER — TELEPHONE (OUTPATIENT)
Dept: FAMILY MEDICINE CLINIC | Facility: CLINIC | Age: 86
End: 2021-11-16

## 2021-11-16 NOTE — TELEPHONE ENCOUNTER
Patient is requesting a referral to see specialist [facility] and fax # [phone] . I advised patient to allow up to 24 to 48 hours for a call back from a nurse. He will be having Micrograph surgery.

## 2021-11-17 ENCOUNTER — TELEPHONE (OUTPATIENT)
Dept: FAMILY MEDICINE CLINIC | Facility: CLINIC | Age: 86
End: 2021-11-17

## 2021-12-03 ENCOUNTER — TELEPHONE (OUTPATIENT)
Dept: FAMILY MEDICINE CLINIC | Facility: CLINIC | Age: 86
End: 2021-12-03

## 2021-12-16 ENCOUNTER — OFFICE VISIT (OUTPATIENT)
Dept: FAMILY MEDICINE CLINIC | Facility: CLINIC | Age: 86
End: 2021-12-16
Payer: MEDICARE

## 2021-12-16 VITALS
OXYGEN SATURATION: 97 % | BODY MASS INDEX: 32.14 KG/M2 | TEMPERATURE: 97 F | HEIGHT: 66 IN | SYSTOLIC BLOOD PRESSURE: 180 MMHG | HEART RATE: 60 BPM | WEIGHT: 200 LBS | DIASTOLIC BLOOD PRESSURE: 64 MMHG

## 2021-12-16 DIAGNOSIS — E78.00 HYPERCHOLESTEROLEMIA: ICD-10-CM

## 2021-12-16 DIAGNOSIS — C44.91 BASAL CELL CARCINOMA (BCC), UNSPECIFIED SITE: ICD-10-CM

## 2021-12-16 DIAGNOSIS — Z23 NEED FOR VACCINATION: ICD-10-CM

## 2021-12-16 DIAGNOSIS — Z00.00 MEDICARE ANNUAL WELLNESS VISIT, SUBSEQUENT: Primary | ICD-10-CM

## 2021-12-16 DIAGNOSIS — L28.2 PRURIGO: ICD-10-CM

## 2021-12-16 DIAGNOSIS — G20 PARKINSON DISEASE (HCC): ICD-10-CM

## 2021-12-16 DIAGNOSIS — I10 ESSENTIAL HYPERTENSION, BENIGN: ICD-10-CM

## 2021-12-16 DIAGNOSIS — F10.21 ALCOHOL USE DISORDER, MODERATE, IN EARLY REMISSION (HCC): ICD-10-CM

## 2021-12-16 DIAGNOSIS — E88.09 HYPOALBUMINEMIA DUE TO PROTEIN-CALORIE MALNUTRITION (HCC): ICD-10-CM

## 2021-12-16 DIAGNOSIS — I05.8 MITRAL VALVE SCLEROSIS: ICD-10-CM

## 2021-12-16 DIAGNOSIS — E66.09 CLASS 1 OBESITY DUE TO EXCESS CALORIES WITH SERIOUS COMORBIDITY AND BODY MASS INDEX (BMI) OF 32.0 TO 32.9 IN ADULT: ICD-10-CM

## 2021-12-16 DIAGNOSIS — I51.7 LVH (LEFT VENTRICULAR HYPERTROPHY): ICD-10-CM

## 2021-12-16 DIAGNOSIS — I50.32 CHRONIC DIASTOLIC HEART FAILURE (HCC): ICD-10-CM

## 2021-12-16 DIAGNOSIS — E46 HYPOALBUMINEMIA DUE TO PROTEIN-CALORIE MALNUTRITION (HCC): ICD-10-CM

## 2021-12-16 DIAGNOSIS — Z12.5 ENCOUNTER FOR SCREENING FOR MALIGNANT NEOPLASM OF PROSTATE: ICD-10-CM

## 2021-12-16 DIAGNOSIS — I51.7 LAE (LEFT ATRIAL ENLARGEMENT): ICD-10-CM

## 2021-12-16 PROBLEM — J12.82 PNEUMONIA DUE TO COVID-19 VIRUS: Status: RESOLVED | Noted: 2020-12-11 | Resolved: 2021-12-16

## 2021-12-16 PROBLEM — U07.1 PNEUMONIA DUE TO COVID-19 VIRUS: Status: RESOLVED | Noted: 2020-12-11 | Resolved: 2021-12-16

## 2021-12-16 PROBLEM — R09.02 HYPOXIA: Status: RESOLVED | Noted: 2020-12-11 | Resolved: 2021-12-16

## 2021-12-16 PROCEDURE — 90662 IIV NO PRSV INCREASED AG IM: CPT | Performed by: FAMILY MEDICINE

## 2021-12-16 PROCEDURE — G0439 PPPS, SUBSEQ VISIT: HCPCS | Performed by: FAMILY MEDICINE

## 2021-12-16 PROCEDURE — G0008 ADMIN INFLUENZA VIRUS VAC: HCPCS | Performed by: FAMILY MEDICINE

## 2021-12-16 PROCEDURE — 99214 OFFICE O/P EST MOD 30 MIN: CPT | Performed by: FAMILY MEDICINE

## 2021-12-16 RX ORDER — KETOCONAZOLE 20 MG/G
CREAM TOPICAL
COMMUNITY
Start: 2021-12-14

## 2021-12-16 RX ORDER — CARBIDOPA/LEVODOPA 25MG-250MG
1 TABLET ORAL 3 TIMES DAILY
COMMUNITY

## 2021-12-16 RX ORDER — AMLODIPINE BESYLATE 10 MG/1
10 TABLET ORAL DAILY
Qty: 90 TABLET | Refills: 3 | Status: SHIPPED | OUTPATIENT
Start: 2021-12-16

## 2021-12-16 RX ORDER — LISINOPRIL 20 MG/1
20 TABLET ORAL DAILY
Qty: 90 TABLET | Refills: 3 | Status: SHIPPED | OUTPATIENT
Start: 2021-12-16

## 2021-12-16 RX ORDER — LISINOPRIL 2.5 MG/1
2.5 TABLET ORAL DAILY
COMMUNITY
Start: 2021-08-12 | End: 2021-12-16 | Stop reason: DRUGHIGH

## 2021-12-16 RX ORDER — FLUOROURACIL 50 MG/G
CREAM TOPICAL
COMMUNITY
Start: 2021-12-08

## 2021-12-16 NOTE — PROGRESS NOTES
HPI:   Daisy Baxter is a 80year old male who presents for a Medicare Subsequent Annual Wellness visit (Pt already had Initial Annual Wellness), hypertension, hyperlipidemia, skin cancer, alcohol use disorder, heart failure, Parkinson's disea to patient     He does NOT have a Power of  for [employer] on file in [address] [place] [address] Rd. Info provided to patient       He smoked tobacco in the past but quit greater than 12 months ago.   Social History    Tobacco Use      Smoking status: Former Smoker Chemistry Labs:   Lab Results   Component Value Date    AST 19 12/22/2020    ALT 11 (L) 12/22/2020    CA 9.1 12/22/2020    ALB 2.8 (L) 12/22/2020    TSH 3.97 10/24/2020    CREATSERUM 0.77 12/22/2020     (H) 12/22/2020        CBC  (most recent labs) nausea and vomiting), Postherpetic neuralgia (11/16/2020), Retained suture (9/29/2015), S/P repair of ventral hernia (3/30/2018), SBO (small bowel obstruction) ([facility] (3/30/2018), Shoulder injury (1987), Suture granuloma (10/3/2017), Tubular adenoma (9-16-2 Screening    Time taken: 12/16/2021  3:30 PM  Screening Method: Finger Rub  Finger Rub Result: Pass (Comment: Right side not tested due to the area being bandaged after procedure for skin cancer)               General Appearance:  Alert, cooperative, no di [address] Free ([id number]) 10/18/2020        ASSESSMENT AND OTHER RELEVANT CHRONIC CONDITIONS:   Shahid Ruiz is a 80year old male who presents for a Medicare Assessment.      Medicare annual wellness visit, subsequent  (primary encounter diagnosis)  En family. 7. Chronic diastolic heart failure [facility])  Patient's cardiologist Dr. Rosa Nina has retired, patient referred to Dr. Morenita Whitt and colleagues.   Recommend complete echo if able prior to appointment with the cardiologist.    - CARD ECHO 2D Collie Skyler 32.9 in adult  Recommend healthy diet such as Mediterranean diet, [facility] diet, DASH diet, [place] diet, or plant-based diet.   Patient to discuss exercise with cardiologist.    15. Prurigo  Vitamin D level ordered and pending.  - VITAMIN D, 25-HYDROXY INTERNAL    Hypercholesterolemia  -     LIPID PANEL    Mitral valve sclerosis  -     CARD ECHO 2D DOPPLER (CPT=93306); Future  -     CARDIO - INTERNAL    LAE (left atrial enlargement)  -     CARD ECHO 2D DOPPLER (CPT=93306);  Future  -     CARDIO - INTERNAL Panel  Cholesterol  Lipoprotein (HDL)  Triglycerides Covered every 5 years for all Medicare beneficiaries without apparent signs or symptoms of cardiovascular disease Lab Results   Component Value Date    CHOLEST 200 (H) 10/24/2020    HDL 43 10/24/2020 Vaccines(1 of 2) Never done        Annual Monitoring of Persistent Medications (ACE/ARB, digoxin diuretics, anticonvulsants)    Potassium Annually Lab Results   Component Value Date    K 3.7 12/22/2020         Creatinine   Annually Lab Results   Component

## 2021-12-16 NOTE — PATIENT INSTRUCTIONS
-Follow-up appointment with Dr. Marie Gonzalez in approximately 1 month.             Shauna Randolph's SCREENING SCHEDULE   Tests on this list are recommended by your physician but may not be covered, or covered at this frequency, by Immunizations    Influenza Covered once per flu season  Please get every year -  Influenza Vaccine(1) due on 10/01/2021    Pneumococcal Each vaccine ([id number] & Vlwyhrkxj17) covered once after 65 Prevnar 13: 11/09/2017    Apxllgndl34: 07/28/2020     No

## 2021-12-19 PROBLEM — U07.1 COVID-19 VIRUS INFECTION: Status: RESOLVED | Noted: 2020-11-24 | Resolved: 2021-12-19

## 2021-12-19 PROBLEM — R10.32 LEFT LOWER QUADRANT ABDOMINAL PAIN: Status: RESOLVED | Noted: 2020-10-24 | Resolved: 2021-12-19

## 2021-12-19 PROBLEM — D58.2 ELEVATED HEMOGLOBIN: Status: RESOLVED | Noted: 2018-04-16 | Resolved: 2021-12-19

## 2021-12-19 PROBLEM — E87.6 HYPOKALEMIA: Status: RESOLVED | Noted: 2018-04-03 | Resolved: 2021-12-19

## 2021-12-19 PROBLEM — R10.9 ABDOMINAL PAIN OF UNKNOWN ETIOLOGY: Status: RESOLVED | Noted: 2020-10-17 | Resolved: 2021-12-19

## 2021-12-19 PROBLEM — D58.2 ELEVATED HEMOGLOBIN (HCC): Status: RESOLVED | Noted: 2018-04-16 | Resolved: 2021-12-19

## 2021-12-19 PROBLEM — E66.811 CLASS 1 OBESITY DUE TO EXCESS CALORIES WITH SERIOUS COMORBIDITY AND BODY MASS INDEX (BMI) OF 32.0 TO 32.9 IN ADULT: Status: ACTIVE | Noted: 2021-12-19

## 2021-12-19 PROBLEM — B02.29 POSTHERPETIC NEURALGIA: Status: RESOLVED | Noted: 2020-11-16 | Resolved: 2021-12-19

## 2021-12-19 PROBLEM — R11.2 NAUSEA AND VOMITING IN ADULT: Status: RESOLVED | Noted: 2020-10-16 | Resolved: 2021-12-19

## 2021-12-19 PROBLEM — B02.9 HERPES ZOSTER WITHOUT COMPLICATION: Status: RESOLVED | Noted: 2020-10-24 | Resolved: 2021-12-19

## 2021-12-19 PROBLEM — E66.09 CLASS 1 OBESITY DUE TO EXCESS CALORIES WITH SERIOUS COMORBIDITY AND BODY MASS INDEX (BMI) OF 32.0 TO 32.9 IN ADULT: Status: ACTIVE | Noted: 2021-12-19

## 2022-01-10 ENCOUNTER — TELEPHONE (OUTPATIENT)
Dept: FAMILY MEDICINE CLINIC | Facility: CLINIC | Age: 87
End: 2022-01-10

## 2022-01-10 NOTE — TELEPHONE ENCOUNTER
Patient is alll done with treatment needs letter to fly back to [facility].  His flight is Thursday January 13th.

## 2022-01-11 NOTE — TELEPHONE ENCOUNTER
I spoke to John robles earlier and now. She says she was aware of the high blood pressure. She was not aware of the echocardiogram order or the recommendations to follow up with cardiologist or neurology.   She is going to speak to the rest of the family ab

## 2022-01-11 NOTE — TELEPHONE ENCOUNTER
Please clarify the family member that was spoken to.   Please also confirm that the family member, Mariam García his granddaughter would be the best person to speak with, is aware that at patient's last office visit in December the patient was noted to have unc

## 2022-01-11 NOTE — TELEPHONE ENCOUNTER
Family states airline has old letter saying no fly for 3 months. They are requesting an updated letter stating ok to fly now. He is going to [facility] on Thursday. [facility] had derm procedure and had his f/u yesterday and is doing fine.  Pended a letter

## 2022-05-05 ENCOUNTER — TELEPHONE (OUTPATIENT)
Dept: FAMILY MEDICINE CLINIC | Facility: CLINIC | Age: 87
End: 2022-05-05

## 2022-05-05 NOTE — TELEPHONE ENCOUNTER
Cece Amado from [facility] and was asking about different diagnosis for this patient. I told her I was not a clinical person and I would have a nurse call her back.

## 2022-08-17 ENCOUNTER — TELEPHONE (OUTPATIENT)
Dept: FAMILY MEDICINE CLINIC | Facility: CLINIC | Age: 87
End: 2022-08-17

## 2022-08-17 NOTE — TELEPHONE ENCOUNTER
Spoke to daughter. She says he has had mid abd pain for 3 days. Reports diarrhea for 4 days but says he no longer has diarrhea. Denies constipation. C/o nausea. She says \"he wants to throw up but can't\". Denies fever. Advised to proceed to IC for evaluation and treatment. She VU.

## 2022-08-17 NOTE — TELEPHONE ENCOUNTER
Patient daughter calling patient has had stomach pain for the last 3 days that is constant no other symptoms

## 2022-09-06 ENCOUNTER — TELEPHONE (OUTPATIENT)
Dept: FAMILY MEDICINE CLINIC | Facility: CLINIC | Age: 87
End: 2022-09-06

## 2022-09-06 DIAGNOSIS — C44.91 BASAL CELL CARCINOMA (BCC), UNSPECIFIED SITE: Primary | ICD-10-CM

## 2022-09-06 NOTE — TELEPHONE ENCOUNTER
Spoke to [email or web address]. States he has two skin caner spots that are bleeding off and on. Advised to contact derm. Updated referral placed. Appointment made for right pain.

## 2022-09-06 NOTE — TELEPHONE ENCOUNTER
Russel, Patient's granddaughter called states patient had bleeding from one of his skin cancer spots on face. He also has pain in his right hip for about a week.  Requesting appt but none until October with Dr. Vikash Burton

## 2022-09-07 ENCOUNTER — APPOINTMENT (OUTPATIENT)
Dept: ULTRASOUND IMAGING | Age: 87
End: 2022-09-07
Attending: EMERGENCY MEDICINE
Payer: MEDICARE

## 2022-09-07 ENCOUNTER — HOSPITAL ENCOUNTER (EMERGENCY)
Age: 87
Discharge: HOME | End: 2022-09-07
Attending: EMERGENCY MEDICINE
Payer: MEDICARE

## 2022-09-07 ENCOUNTER — APPOINTMENT (OUTPATIENT)
Dept: GENERAL RADIOLOGY | Age: 87
End: 2022-09-07
Payer: MEDICARE

## 2022-09-07 VITALS
BODY MASS INDEX: 31.96 KG/M2 | TEMPERATURE: 98 F | OXYGEN SATURATION: 95 % | SYSTOLIC BLOOD PRESSURE: 161 MMHG | WEIGHT: 198.88 LBS | RESPIRATION RATE: 18 BRPM | DIASTOLIC BLOOD PRESSURE: 77 MMHG | HEART RATE: 67 BPM | HEIGHT: 66 IN

## 2022-09-07 DIAGNOSIS — S76.011A HIP STRAIN, RIGHT, INITIAL ENCOUNTER: ICD-10-CM

## 2022-09-07 DIAGNOSIS — R23.8 SKIN IRRITATION: Primary | ICD-10-CM

## 2022-09-07 LAB
ALBUMIN SERPL-MCNC: 3.3 G/DL (ref 3.4–5)
ALBUMIN/GLOB SERPL: 0.9 {RATIO} (ref 1–2)
ALP LIVER SERPL-CCNC: 80 U/L
ALT SERPL-CCNC: 17 U/L
ANION GAP SERPL CALC-SCNC: 7 MMOL/L (ref 0–18)
AST SERPL-CCNC: 24 U/L (ref 15–37)
BASOPHILS # BLD AUTO: 0.06 X10(3) UL (ref 0–0.2)
BASOPHILS NFR BLD AUTO: 0.9 %
BILIRUB SERPL-MCNC: 0.3 MG/DL (ref 0.1–2)
BUN BLD-MCNC: 18 MG/DL (ref 7–18)
CALCIUM BLD-MCNC: 9.2 MG/DL (ref 8.5–10.1)
CHLORIDE SERPL-SCNC: 107 MMOL/L (ref 98–112)
CO2 SERPL-SCNC: 28 MMOL/L (ref 21–32)
CREAT BLD-MCNC: 0.79 MG/DL
EOSINOPHIL # BLD AUTO: 0.12 X10(3) UL (ref 0–0.7)
EOSINOPHIL NFR BLD AUTO: 1.8 %
ERYTHROCYTE [DISTWIDTH] IN BLOOD BY AUTOMATED COUNT: 13.2 %
GFR SERPLBLD BASED ON 1.73 SQ M-ARVRAT: 86 ML/MIN/1.73M2 (ref 60–?)
GLOBULIN PLAS-MCNC: 3.7 G/DL (ref 2.8–4.4)
GLUCOSE BLD-MCNC: 132 MG/DL (ref 70–99)
HCT VFR BLD AUTO: 43.2 %
HGB BLD-MCNC: 14.8 G/DL
IMM GRANULOCYTES # BLD AUTO: 0.03 X10(3) UL (ref 0–1)
IMM GRANULOCYTES NFR BLD: 0.4 %
LYMPHOCYTES # BLD AUTO: 1.87 X10(3) UL (ref 1–4)
LYMPHOCYTES NFR BLD AUTO: 27.7 %
MCH RBC QN AUTO: 32 PG (ref 26–34)
MCHC RBC AUTO-ENTMCNC: 34.3 G/DL (ref 31–37)
MCV RBC AUTO: 93.3 FL
MONOCYTES # BLD AUTO: 0.69 X10(3) UL (ref 0.1–1)
MONOCYTES NFR BLD AUTO: 10.2 %
NEUTROPHILS # BLD AUTO: 3.98 X10 (3) UL (ref 1.5–7.7)
NEUTROPHILS # BLD AUTO: 3.98 X10(3) UL (ref 1.5–7.7)
NEUTROPHILS NFR BLD AUTO: 59 %
OSMOLALITY SERPL CALC.SUM OF ELEC: 298 MOSM/KG (ref 275–295)
PLATELET # BLD AUTO: 209 10(3)UL (ref 150–450)
POTASSIUM SERPL-SCNC: 3.7 MMOL/L (ref 3.5–5.1)
PROT SERPL-MCNC: 7 G/DL (ref 6.4–8.2)
RBC # BLD AUTO: 4.63 X10(6)UL
SODIUM SERPL-SCNC: 142 MMOL/L (ref 136–145)
WBC # BLD AUTO: 6.8 X10(3) UL (ref 4–11)

## 2022-09-07 PROCEDURE — 85025 COMPLETE CBC W/AUTO DIFF WBC: CPT | Performed by: EMERGENCY MEDICINE

## 2022-09-07 PROCEDURE — 93971 EXTREMITY STUDY: CPT | Performed by: EMERGENCY MEDICINE

## 2022-09-07 PROCEDURE — 99284 EMERGENCY DEPT VISIT MOD MDM: CPT

## 2022-09-07 PROCEDURE — 80053 COMPREHEN METABOLIC PANEL: CPT | Performed by: EMERGENCY MEDICINE

## 2022-09-07 PROCEDURE — 73502 X-RAY EXAM HIP UNI 2-3 VIEWS: CPT

## 2022-09-07 PROCEDURE — 73502 X-RAY EXAM HIP UNI 2-3 VIEWS: CPT | Performed by: EMERGENCY MEDICINE

## 2022-09-07 PROCEDURE — 36415 COLL VENOUS BLD VENIPUNCTURE: CPT

## 2022-09-19 ENCOUNTER — HOSPITAL ENCOUNTER (EMERGENCY)
Age: 87
Discharge: HOME | End: 2022-09-19
Attending: EMERGENCY MEDICINE

## 2022-09-19 ENCOUNTER — APPOINTMENT (OUTPATIENT)
Dept: CT IMAGING | Age: 87
End: 2022-09-19
Attending: EMERGENCY MEDICINE

## 2022-09-19 VITALS
HEART RATE: 73 BPM | BODY MASS INDEX: 32.78 KG/M2 | OXYGEN SATURATION: 95 % | SYSTOLIC BLOOD PRESSURE: 152 MMHG | DIASTOLIC BLOOD PRESSURE: 62 MMHG | WEIGHT: 204 LBS | TEMPERATURE: 99 F | HEIGHT: 66 IN | RESPIRATION RATE: 16 BRPM

## 2022-09-19 DIAGNOSIS — M54.40 BACK PAIN OF LUMBAR REGION WITH SCIATICA: ICD-10-CM

## 2022-09-19 DIAGNOSIS — R10.9 FLANK PAIN: Primary | ICD-10-CM

## 2022-09-19 LAB
ALBUMIN SERPL-MCNC: 3.2 G/DL (ref 3.4–5)
ALBUMIN/GLOB SERPL: 0.8 {RATIO} (ref 1–2)
ALP LIVER SERPL-CCNC: 81 U/L
ALT SERPL-CCNC: 10 U/L
ANION GAP SERPL CALC-SCNC: 8 MMOL/L (ref 0–18)
AST SERPL-CCNC: 18 U/L (ref 15–37)
BASOPHILS # BLD AUTO: 0.06 X10(3) UL (ref 0–0.2)
BASOPHILS NFR BLD AUTO: 0.8 %
BILIRUB SERPL-MCNC: 0.3 MG/DL (ref 0.1–2)
BUN BLD-MCNC: 21 MG/DL (ref 7–18)
CALCIUM BLD-MCNC: 9 MG/DL (ref 8.5–10.1)
CHLORIDE SERPL-SCNC: 102 MMOL/L (ref 98–112)
CO2 SERPL-SCNC: 28 MMOL/L (ref 21–32)
CREAT BLD-MCNC: 0.88 MG/DL
EOSINOPHIL # BLD AUTO: 0.13 X10(3) UL (ref 0–0.7)
EOSINOPHIL NFR BLD AUTO: 1.6 %
ERYTHROCYTE [DISTWIDTH] IN BLOOD BY AUTOMATED COUNT: 13.3 %
GFR SERPLBLD BASED ON 1.73 SQ M-ARVRAT: 83 ML/MIN/1.73M2 (ref 60–?)
GLOBULIN PLAS-MCNC: 4.1 G/DL (ref 2.8–4.4)
GLUCOSE BLD-MCNC: 176 MG/DL (ref 70–99)
HCT VFR BLD AUTO: 43.3 %
HGB BLD-MCNC: 14.9 G/DL
IMM GRANULOCYTES # BLD AUTO: 0.04 X10(3) UL (ref 0–1)
IMM GRANULOCYTES NFR BLD: 0.5 %
LYMPHOCYTES # BLD AUTO: 1.75 X10(3) UL (ref 1–4)
LYMPHOCYTES NFR BLD AUTO: 22 %
MCH RBC QN AUTO: 32 PG (ref 26–34)
MCHC RBC AUTO-ENTMCNC: 34.4 G/DL (ref 31–37)
MCV RBC AUTO: 92.9 FL
MONOCYTES # BLD AUTO: 0.93 X10(3) UL (ref 0.1–1)
MONOCYTES NFR BLD AUTO: 11.7 %
NEUTROPHILS # BLD AUTO: 5.04 X10 (3) UL (ref 1.5–7.7)
NEUTROPHILS # BLD AUTO: 5.04 X10(3) UL (ref 1.5–7.7)
NEUTROPHILS NFR BLD AUTO: 63.4 %
OSMOLALITY SERPL CALC.SUM OF ELEC: 293 MOSM/KG (ref 275–295)
PLATELET # BLD AUTO: 193 10(3)UL (ref 150–450)
POTASSIUM SERPL-SCNC: 3.7 MMOL/L (ref 3.5–5.1)
PROT SERPL-MCNC: 7.3 G/DL (ref 6.4–8.2)
RBC # BLD AUTO: 4.66 X10(6)UL
SODIUM SERPL-SCNC: 138 MMOL/L (ref 136–145)
WBC # BLD AUTO: 8 X10(3) UL (ref 4–11)

## 2022-09-19 PROCEDURE — 99284 EMERGENCY DEPT VISIT MOD MDM: CPT

## 2022-09-19 PROCEDURE — 80053 COMPREHEN METABOLIC PANEL: CPT | Performed by: EMERGENCY MEDICINE

## 2022-09-19 PROCEDURE — 85025 COMPLETE CBC W/AUTO DIFF WBC: CPT | Performed by: EMERGENCY MEDICINE

## 2022-09-19 PROCEDURE — 74177 CT ABD & PELVIS W/CONTRAST: CPT | Performed by: EMERGENCY MEDICINE

## 2022-09-19 PROCEDURE — 96374 THER/PROPH/DIAG INJ IV PUSH: CPT

## 2022-09-19 RX ORDER — METHYLPREDNISOLONE 4 MG/1
TABLET ORAL
Qty: 1 EACH | Refills: 0 | Status: SHIPPED | OUTPATIENT
Start: 2022-09-19

## 2022-09-19 RX ORDER — TRAMADOL HYDROCHLORIDE 50 MG/1
50 TABLET ORAL EVERY 8 HOURS PRN
Qty: 10 TABLET | Refills: 0 | Status: SHIPPED | OUTPATIENT
Start: 2022-09-19

## 2022-09-19 RX ORDER — HYDROMORPHONE HYDROCHLORIDE 1 MG/ML
0.5 INJECTION, SOLUTION INTRAMUSCULAR; INTRAVENOUS; SUBCUTANEOUS ONCE
Status: COMPLETED | OUTPATIENT
Start: 2022-09-19 | End: 2022-09-19

## 2022-09-19 NOTE — ED INITIAL ASSESSMENT (HPI)
Pt presents with right lower back pain radiating down right leg x2 week. Denies injury. Seen in ED x1 week ago, reports worse today.

## 2022-09-21 ENCOUNTER — TELEPHONE (OUTPATIENT)
Dept: FAMILY MEDICINE CLINIC | Facility: CLINIC | Age: 87
End: 2022-09-21

## 2022-09-21 NOTE — TELEPHONE ENCOUNTER
Patient grand daughter calling pt was in ER 9/19 for back pain was told to follow up with pcp before the 21st

## 2022-09-28 ENCOUNTER — OFFICE VISIT (OUTPATIENT)
Dept: FAMILY MEDICINE CLINIC | Facility: CLINIC | Age: 87
End: 2022-09-28

## 2022-09-28 VITALS
OXYGEN SATURATION: 97 % | RESPIRATION RATE: 20 BRPM | TEMPERATURE: 98 F | DIASTOLIC BLOOD PRESSURE: 82 MMHG | BODY MASS INDEX: 32.2 KG/M2 | HEART RATE: 65 BPM | WEIGHT: 200.38 LBS | SYSTOLIC BLOOD PRESSURE: 130 MMHG | HEIGHT: 66 IN

## 2022-09-28 DIAGNOSIS — Z85.828 HISTORY OF SKIN CANCER: ICD-10-CM

## 2022-09-28 DIAGNOSIS — I51.9 ASYMPTOMATIC LV DYSFUNCTION: ICD-10-CM

## 2022-09-28 DIAGNOSIS — E78.00 HYPERCHOLESTEROLEMIA: ICD-10-CM

## 2022-09-28 DIAGNOSIS — I51.7 LAE (LEFT ATRIAL ENLARGEMENT): ICD-10-CM

## 2022-09-28 DIAGNOSIS — G89.29 CHRONIC RIGHT SHOULDER PAIN: ICD-10-CM

## 2022-09-28 DIAGNOSIS — I05.8 MITRAL VALVE SCLEROSIS: ICD-10-CM

## 2022-09-28 DIAGNOSIS — Z23 NEED FOR VACCINATION: ICD-10-CM

## 2022-09-28 DIAGNOSIS — I35.8 AORTIC VALVE SCLEROSIS: ICD-10-CM

## 2022-09-28 DIAGNOSIS — G20 PARKINSON DISEASE (HCC): ICD-10-CM

## 2022-09-28 DIAGNOSIS — F10.21 ALCOHOL USE DISORDER, MODERATE, IN EARLY REMISSION (HCC): ICD-10-CM

## 2022-09-28 DIAGNOSIS — E66.09 CLASS 1 OBESITY DUE TO EXCESS CALORIES WITH SERIOUS COMORBIDITY AND BODY MASS INDEX (BMI) OF 32.0 TO 32.9 IN ADULT: ICD-10-CM

## 2022-09-28 DIAGNOSIS — I50.32 CHRONIC DIASTOLIC HEART FAILURE (HCC): ICD-10-CM

## 2022-09-28 DIAGNOSIS — N40.0 BENIGN PROSTATIC HYPERPLASIA, UNSPECIFIED WHETHER LOWER URINARY TRACT SYMPTOMS PRESENT: ICD-10-CM

## 2022-09-28 DIAGNOSIS — Z12.5 SCREENING FOR PROSTATE CANCER: ICD-10-CM

## 2022-09-28 DIAGNOSIS — R52 ACUTE PAIN: ICD-10-CM

## 2022-09-28 DIAGNOSIS — R01.1 HEART MURMUR: ICD-10-CM

## 2022-09-28 DIAGNOSIS — M54.16 LUMBAR RADICULOPATHY: ICD-10-CM

## 2022-09-28 DIAGNOSIS — I51.7 LVH (LEFT VENTRICULAR HYPERTROPHY): ICD-10-CM

## 2022-09-28 DIAGNOSIS — M43.10 RETROLISTHESIS: ICD-10-CM

## 2022-09-28 DIAGNOSIS — E46 MALNUTRITION, UNSPECIFIED TYPE (HCC): ICD-10-CM

## 2022-09-28 DIAGNOSIS — Z00.00 MEDICARE ANNUAL WELLNESS VISIT, SUBSEQUENT: Primary | ICD-10-CM

## 2022-09-28 DIAGNOSIS — R91.8 GROUND GLASS OPACITY PRESENT ON IMAGING OF LUNG: ICD-10-CM

## 2022-09-28 DIAGNOSIS — R73.03 PREDIABETES: ICD-10-CM

## 2022-09-28 DIAGNOSIS — I10 ESSENTIAL HYPERTENSION, BENIGN: ICD-10-CM

## 2022-09-28 DIAGNOSIS — M18.11 PRIMARY OSTEOARTHRITIS OF FIRST CARPOMETACARPAL JOINT OF RIGHT HAND: ICD-10-CM

## 2022-09-28 DIAGNOSIS — M19.019 GLENOHUMERAL ARTHRITIS: ICD-10-CM

## 2022-09-28 DIAGNOSIS — R73.9 HYPERGLYCEMIA: ICD-10-CM

## 2022-09-28 DIAGNOSIS — M25.511 CHRONIC RIGHT SHOULDER PAIN: ICD-10-CM

## 2022-09-28 PROBLEM — Z91.81 RISK FOR FALLS: Status: RESOLVED | Noted: 2020-12-11 | Resolved: 2022-09-28

## 2022-09-28 PROBLEM — R23.3 EASY BRUISING: Status: ACTIVE | Noted: 2022-09-28

## 2022-09-28 PROBLEM — H53.9 CHANGE IN VISION: Status: RESOLVED | Noted: 2020-08-04 | Resolved: 2022-09-28

## 2022-09-28 PROBLEM — C44.91 BASAL CELL CARCINOMA (BCC): Status: RESOLVED | Noted: 2020-08-04 | Resolved: 2022-09-28

## 2022-09-28 PROBLEM — R35.1 NOCTURIA: Status: RESOLVED | Noted: 2017-11-14 | Resolved: 2022-09-28

## 2022-09-28 PROBLEM — Z85.038 HISTORY OF COLON CANCER: Status: RESOLVED | Noted: 2020-08-04 | Resolved: 2022-09-28

## 2022-09-28 PROBLEM — L28.2 PRURIGO: Status: RESOLVED | Noted: 2020-08-04 | Resolved: 2022-09-28

## 2022-09-28 PROBLEM — R23.3 EASY BRUISING: Status: RESOLVED | Noted: 2022-09-28 | Resolved: 2022-09-28

## 2022-09-28 PROBLEM — IMO0001 ALCOHOLISM /ALCOHOL ABUSE: Status: RESOLVED | Noted: 2017-08-30 | Resolved: 2022-09-28

## 2022-09-28 PROBLEM — R53.1 GENERALIZED WEAKNESS: Status: RESOLVED | Noted: 2020-12-11 | Resolved: 2022-09-28

## 2022-09-28 LAB
CARTRIDGE LOT#: 924 NUMERIC
HEMOGLOBIN A1C: 6.4 % (ref 4.3–5.6)

## 2022-09-28 RX ORDER — TRAMADOL HYDROCHLORIDE 50 MG/1
50 TABLET ORAL 2 TIMES DAILY PRN
Qty: 30 TABLET | Refills: 0 | Status: SHIPPED | OUTPATIENT
Start: 2022-09-28

## 2022-09-28 RX ORDER — LISINOPRIL 20 MG/1
20 TABLET ORAL DAILY
Qty: 90 TABLET | Refills: 3 | Status: SHIPPED | OUTPATIENT
Start: 2022-09-28

## 2022-09-28 RX ORDER — AMLODIPINE BESYLATE 10 MG/1
10 TABLET ORAL DAILY
Qty: 90 TABLET | Refills: 3 | Status: SHIPPED | OUTPATIENT
Start: 2022-09-28

## 2022-11-10 ENCOUNTER — OFFICE VISIT (OUTPATIENT)
Dept: NEUROLOGY | Facility: CLINIC | Age: 87
End: 2022-11-10
Payer: COMMERCIAL

## 2022-11-10 VITALS
BODY MASS INDEX: 32.14 KG/M2 | DIASTOLIC BLOOD PRESSURE: 64 MMHG | WEIGHT: 200 LBS | RESPIRATION RATE: 16 BRPM | HEIGHT: 66 IN | HEART RATE: 82 BPM | SYSTOLIC BLOOD PRESSURE: 142 MMHG

## 2022-11-10 DIAGNOSIS — G20 PARKINSON DISEASE (HCC): Primary | ICD-10-CM

## 2022-11-10 DIAGNOSIS — F03.90 DEMENTIA, UNSPECIFIED DEMENTIA SEVERITY, UNSPECIFIED DEMENTIA TYPE, UNSPECIFIED WHETHER BEHAVIORAL, PSYCHOTIC, OR MOOD DISTURBANCE OR ANXIETY (HCC): ICD-10-CM

## 2022-11-10 PROCEDURE — 3078F DIAST BP <80 MM HG: CPT | Performed by: OTHER

## 2022-11-10 PROCEDURE — 3077F SYST BP >= 140 MM HG: CPT | Performed by: OTHER

## 2022-11-10 PROCEDURE — 99215 OFFICE O/P EST HI 40 MIN: CPT | Performed by: OTHER

## 2022-11-10 PROCEDURE — 3008F BODY MASS INDEX DOCD: CPT | Performed by: OTHER

## 2022-11-10 RX ORDER — DONEPEZIL HYDROCHLORIDE 5 MG/1
5 TABLET, FILM COATED ORAL NIGHTLY
Qty: 30 TABLET | Refills: 2 | Status: SHIPPED | OUTPATIENT
Start: 2022-11-10

## 2022-11-10 RX ORDER — DONEPEZIL HYDROCHLORIDE 5 MG/1
5 TABLET, FILM COATED ORAL NIGHTLY
Qty: 30 TABLET | Refills: 2 | Status: SHIPPED | OUTPATIENT
Start: 2022-11-10 | End: 2022-11-10

## 2022-11-10 RX ORDER — ACETAMINOPHEN 325 MG/1
325 TABLET ORAL EVERY 6 HOURS PRN
COMMUNITY

## 2022-11-10 RX ORDER — DONEPEZIL HYDROCHLORIDE 10 MG/1
10 TABLET, FILM COATED ORAL NIGHTLY
Qty: 30 TABLET | Refills: 2 | Status: SHIPPED | OUTPATIENT
Start: 2022-11-10 | End: 2022-11-10

## 2022-11-14 DIAGNOSIS — G20 PARKINSON DISEASE (HCC): ICD-10-CM

## 2022-11-14 NOTE — TELEPHONE ENCOUNTER
CARBIDOPA-LEVODOPA  MG refilled to requesting pharmacy 11/10/22 #120/2R    RN, please refuse as this is a duplicate request.

## 2022-11-16 ENCOUNTER — APPOINTMENT (OUTPATIENT)
Dept: ULTRASOUND IMAGING | Age: 87
End: 2022-11-16
Attending: EMERGENCY MEDICINE
Payer: MEDICARE

## 2022-11-16 ENCOUNTER — HOSPITAL ENCOUNTER (EMERGENCY)
Age: 87
Discharge: HOME | End: 2022-11-16
Attending: EMERGENCY MEDICINE
Payer: MEDICARE

## 2022-11-16 ENCOUNTER — APPOINTMENT (OUTPATIENT)
Dept: GENERAL RADIOLOGY | Age: 87
End: 2022-11-16
Attending: EMERGENCY MEDICINE
Payer: MEDICARE

## 2022-11-16 ENCOUNTER — HOSPITAL ENCOUNTER (OUTPATIENT)
Dept: CT IMAGING | Age: 87
Discharge: HOME | End: 2022-11-16
Attending: Other
Payer: MEDICARE

## 2022-11-16 VITALS
HEART RATE: 65 BPM | WEIGHT: 200 LBS | DIASTOLIC BLOOD PRESSURE: 65 MMHG | BODY MASS INDEX: 32 KG/M2 | RESPIRATION RATE: 18 BRPM | OXYGEN SATURATION: 95 % | SYSTOLIC BLOOD PRESSURE: 164 MMHG | TEMPERATURE: 98 F

## 2022-11-16 DIAGNOSIS — F03.90 DEMENTIA, UNSPECIFIED DEMENTIA SEVERITY, UNSPECIFIED DEMENTIA TYPE, UNSPECIFIED WHETHER BEHAVIORAL, PSYCHOTIC, OR MOOD DISTURBANCE OR ANXIETY (HCC): ICD-10-CM

## 2022-11-16 DIAGNOSIS — G20 PARKINSON DISEASE (HCC): ICD-10-CM

## 2022-11-16 DIAGNOSIS — M54.40 BACK PAIN OF LUMBAR REGION WITH SCIATICA: Primary | ICD-10-CM

## 2022-11-16 PROCEDURE — 93971 EXTREMITY STUDY: CPT | Performed by: EMERGENCY MEDICINE

## 2022-11-16 PROCEDURE — 73502 X-RAY EXAM HIP UNI 2-3 VIEWS: CPT | Performed by: EMERGENCY MEDICINE

## 2022-11-16 PROCEDURE — 99284 EMERGENCY DEPT VISIT MOD MDM: CPT

## 2022-11-16 PROCEDURE — 72110 X-RAY EXAM L-2 SPINE 4/>VWS: CPT | Performed by: EMERGENCY MEDICINE

## 2022-11-16 PROCEDURE — 70450 CT HEAD/BRAIN W/O DYE: CPT | Performed by: OTHER

## 2022-11-16 RX ORDER — HYDROCODONE BITARTRATE AND ACETAMINOPHEN 5; 325 MG/1; MG/1
1 TABLET ORAL ONCE
Status: COMPLETED | OUTPATIENT
Start: 2022-11-16 | End: 2022-11-16

## 2022-11-16 RX ORDER — TRAMADOL HYDROCHLORIDE 50 MG/1
TABLET ORAL EVERY 6 HOURS PRN
Qty: 10 TABLET | Refills: 0 | Status: SHIPPED | OUTPATIENT
Start: 2022-11-16 | End: 2022-11-21

## 2022-11-16 RX ORDER — LIDOCAINE 50 MG/G
2 PATCH TOPICAL EVERY 24 HOURS
Qty: 14 PATCH | Refills: 0 | Status: SHIPPED | OUTPATIENT
Start: 2022-11-16 | End: 2022-11-23

## 2022-11-16 RX ORDER — METHYLPREDNISOLONE 4 MG/1
TABLET ORAL
Qty: 1 EACH | Refills: 0 | Status: SHIPPED | OUTPATIENT
Start: 2022-11-16

## 2023-01-19 ENCOUNTER — OFFICE VISIT (OUTPATIENT)
Dept: FAMILY MEDICINE CLINIC | Facility: CLINIC | Age: 88
End: 2023-01-19
Payer: COMMERCIAL

## 2023-01-19 VITALS
RESPIRATION RATE: 20 BRPM | WEIGHT: 209 LBS | HEIGHT: 66 IN | DIASTOLIC BLOOD PRESSURE: 75 MMHG | HEART RATE: 75 BPM | BODY MASS INDEX: 33.59 KG/M2 | SYSTOLIC BLOOD PRESSURE: 130 MMHG | OXYGEN SATURATION: 99 % | TEMPERATURE: 98 F

## 2023-01-19 DIAGNOSIS — Z85.828 HISTORY OF SKIN CANCER: ICD-10-CM

## 2023-01-19 DIAGNOSIS — I50.32 CHRONIC DIASTOLIC HEART FAILURE (HCC): ICD-10-CM

## 2023-01-19 DIAGNOSIS — D48.5 NEOPLASM OF UNCERTAIN BEHAVIOR OF SKIN: ICD-10-CM

## 2023-01-19 DIAGNOSIS — R54 FRAIL ELDERLY: ICD-10-CM

## 2023-01-19 DIAGNOSIS — R26.2 DIFFICULTY WALKING: ICD-10-CM

## 2023-01-19 DIAGNOSIS — G20 PARKINSON DISEASE (HCC): Primary | ICD-10-CM

## 2023-01-19 DIAGNOSIS — I10 ESSENTIAL HYPERTENSION, BENIGN: ICD-10-CM

## 2023-01-19 DIAGNOSIS — F10.21 ALCOHOL USE DISORDER, MODERATE, IN EARLY REMISSION (HCC): ICD-10-CM

## 2023-01-19 DIAGNOSIS — M54.16 LUMBAR BACK PAIN WITH RADICULOPATHY AFFECTING RIGHT LOWER EXTREMITY: ICD-10-CM

## 2023-01-19 DIAGNOSIS — R54 ADVANCED AGE: ICD-10-CM

## 2023-01-19 PROBLEM — R52 ACUTE PAIN: Status: RESOLVED | Noted: 2022-09-28 | Resolved: 2023-01-19

## 2023-01-19 PROBLEM — E66.09 CLASS 1 OBESITY DUE TO EXCESS CALORIES WITH SERIOUS COMORBIDITY AND BODY MASS INDEX (BMI) OF 32.0 TO 32.9 IN ADULT: Status: RESOLVED | Noted: 2021-12-19 | Resolved: 2023-01-19

## 2023-01-19 PROBLEM — E66.811 CLASS 1 OBESITY DUE TO EXCESS CALORIES WITH SERIOUS COMORBIDITY AND BODY MASS INDEX (BMI) OF 32.0 TO 32.9 IN ADULT: Status: RESOLVED | Noted: 2021-12-19 | Resolved: 2023-01-19

## 2023-01-19 PROCEDURE — 3078F DIAST BP <80 MM HG: CPT | Performed by: FAMILY MEDICINE

## 2023-01-19 PROCEDURE — 99215 OFFICE O/P EST HI 40 MIN: CPT | Performed by: FAMILY MEDICINE

## 2023-01-19 PROCEDURE — 1125F AMNT PAIN NOTED PAIN PRSNT: CPT | Performed by: FAMILY MEDICINE

## 2023-01-19 PROCEDURE — 3075F SYST BP GE 130 - 139MM HG: CPT | Performed by: FAMILY MEDICINE

## 2023-01-19 PROCEDURE — 3008F BODY MASS INDEX DOCD: CPT | Performed by: FAMILY MEDICINE

## 2023-01-19 RX ORDER — METHYLPREDNISOLONE 4 MG/1
TABLET ORAL
Qty: 1 EACH | Refills: 0 | Status: SHIPPED | OUTPATIENT
Start: 2023-01-19

## 2023-01-20 ENCOUNTER — TELEPHONE (OUTPATIENT)
Dept: FAMILY MEDICINE CLINIC | Facility: CLINIC | Age: 88
End: 2023-01-20

## 2023-01-20 NOTE — TELEPHONE ENCOUNTER
Incoming message received from [facility] stating they do not take patient's insurance. Home Health orders, face sheet, office notes, faxed to [facility].

## 2023-01-23 LAB
CHOL/HDLC RATIO: 5.5 (CALC)
CHOLESTEROL, TOTAL: 249 MG/DL
HDL CHOLESTEROL: 45 MG/DL
LDL-CHOLESTEROL: 172 MG/DL (CALC)
NON-HDL CHOLESTEROL: 204 MG/DL (CALC)
T4, FREE: 1.1 NG/DL (ref 0.8–1.8)
TOTAL PSA: 1.9 NG/ML
TRIGLYCERIDES: 175 MG/DL
TSH: 2.58 MIU/L (ref 0.4–4.5)

## 2023-02-08 ENCOUNTER — OFFICE VISIT (OUTPATIENT)
Dept: NEUROLOGY | Facility: CLINIC | Age: 88
End: 2023-02-08
Payer: COMMERCIAL

## 2023-02-08 VITALS
RESPIRATION RATE: 16 BRPM | DIASTOLIC BLOOD PRESSURE: 62 MMHG | HEART RATE: 86 BPM | BODY MASS INDEX: 33.59 KG/M2 | SYSTOLIC BLOOD PRESSURE: 138 MMHG | WEIGHT: 209 LBS | HEIGHT: 66 IN

## 2023-02-08 DIAGNOSIS — G20 PARKINSON DISEASE (HCC): Primary | ICD-10-CM

## 2023-02-08 DIAGNOSIS — F03.90 DEMENTIA, UNSPECIFIED DEMENTIA SEVERITY, UNSPECIFIED DEMENTIA TYPE, UNSPECIFIED WHETHER BEHAVIORAL, PSYCHOTIC, OR MOOD DISTURBANCE OR ANXIETY (HCC): ICD-10-CM

## 2023-02-08 PROCEDURE — 99213 OFFICE O/P EST LOW 20 MIN: CPT | Performed by: OTHER

## 2023-02-08 PROCEDURE — 3078F DIAST BP <80 MM HG: CPT | Performed by: OTHER

## 2023-02-08 PROCEDURE — 3008F BODY MASS INDEX DOCD: CPT | Performed by: OTHER

## 2023-02-08 PROCEDURE — 3075F SYST BP GE 130 - 139MM HG: CPT | Performed by: OTHER

## 2023-02-16 ENCOUNTER — OFFICE VISIT (OUTPATIENT)
Dept: FAMILY MEDICINE CLINIC | Facility: CLINIC | Age: 88
End: 2023-02-16
Payer: COMMERCIAL

## 2023-02-16 VITALS
HEIGHT: 66 IN | BODY MASS INDEX: 33.85 KG/M2 | SYSTOLIC BLOOD PRESSURE: 130 MMHG | WEIGHT: 210.63 LBS | TEMPERATURE: 98 F | HEART RATE: 75 BPM | RESPIRATION RATE: 20 BRPM | DIASTOLIC BLOOD PRESSURE: 70 MMHG | OXYGEN SATURATION: 99 %

## 2023-02-16 DIAGNOSIS — D48.5 NEOPLASM OF UNCERTAIN BEHAVIOR OF SKIN: ICD-10-CM

## 2023-02-16 DIAGNOSIS — I73.9 SMALL VESSEL DISEASE (HCC): ICD-10-CM

## 2023-02-16 DIAGNOSIS — R73.03 PREDIABETES: ICD-10-CM

## 2023-02-16 DIAGNOSIS — R54 ADVANCED AGE: ICD-10-CM

## 2023-02-16 DIAGNOSIS — I51.7 LVH (LEFT VENTRICULAR HYPERTROPHY): ICD-10-CM

## 2023-02-16 DIAGNOSIS — I51.9 ASYMPTOMATIC LV DYSFUNCTION: ICD-10-CM

## 2023-02-16 DIAGNOSIS — F10.21 ALCOHOL USE DISORDER, MODERATE, IN SUSTAINED REMISSION (HCC): ICD-10-CM

## 2023-02-16 DIAGNOSIS — G20 PARKINSON DISEASE (HCC): ICD-10-CM

## 2023-02-16 DIAGNOSIS — R91.8 GROUND GLASS OPACITY PRESENT ON IMAGING OF LUNG: ICD-10-CM

## 2023-02-16 DIAGNOSIS — Z85.828 HISTORY OF SKIN CANCER: ICD-10-CM

## 2023-02-16 DIAGNOSIS — I10 ESSENTIAL HYPERTENSION, BENIGN: ICD-10-CM

## 2023-02-16 DIAGNOSIS — M54.16 LUMBAR BACK PAIN WITH RADICULOPATHY AFFECTING RIGHT LOWER EXTREMITY: ICD-10-CM

## 2023-02-16 DIAGNOSIS — R01.1 HEART MURMUR: ICD-10-CM

## 2023-02-16 DIAGNOSIS — Z00.00 MEDICARE ANNUAL WELLNESS VISIT, SUBSEQUENT: Primary | ICD-10-CM

## 2023-02-16 DIAGNOSIS — I51.7 LAE (LEFT ATRIAL ENLARGEMENT): ICD-10-CM

## 2023-02-16 DIAGNOSIS — E88.09 HYPOALBUMINEMIA DUE TO PROTEIN-CALORIE MALNUTRITION (HCC): ICD-10-CM

## 2023-02-16 DIAGNOSIS — R26.2 DIFFICULTY WALKING: ICD-10-CM

## 2023-02-16 DIAGNOSIS — E46 HYPOALBUMINEMIA DUE TO PROTEIN-CALORIE MALNUTRITION (HCC): ICD-10-CM

## 2023-02-16 DIAGNOSIS — I35.8 AORTIC VALVE SCLEROSIS: ICD-10-CM

## 2023-02-16 DIAGNOSIS — K46.9 ABDOMINAL HERNIA WITHOUT OBSTRUCTION AND WITHOUT GANGRENE, RECURRENCE NOT SPECIFIED, UNSPECIFIED HERNIA TYPE: ICD-10-CM

## 2023-02-16 DIAGNOSIS — M43.10 RETROLISTHESIS: ICD-10-CM

## 2023-02-16 DIAGNOSIS — R54 FRAIL ELDERLY: ICD-10-CM

## 2023-02-16 DIAGNOSIS — K80.20 CALCULUS OF GALLBLADDER WITHOUT CHOLECYSTITIS WITHOUT OBSTRUCTION: ICD-10-CM

## 2023-02-16 DIAGNOSIS — M18.11 PRIMARY OSTEOARTHRITIS OF FIRST CARPOMETACARPAL JOINT OF RIGHT HAND: ICD-10-CM

## 2023-02-16 DIAGNOSIS — R73.9 HYPERGLYCEMIA: ICD-10-CM

## 2023-02-16 DIAGNOSIS — M25.511 CHRONIC RIGHT SHOULDER PAIN: ICD-10-CM

## 2023-02-16 DIAGNOSIS — N40.0 BENIGN PROSTATIC HYPERPLASIA, UNSPECIFIED WHETHER LOWER URINARY TRACT SYMPTOMS PRESENT: ICD-10-CM

## 2023-02-16 DIAGNOSIS — G89.29 CHRONIC RIGHT SHOULDER PAIN: ICD-10-CM

## 2023-02-16 DIAGNOSIS — I05.8 MITRAL VALVE SCLEROSIS: ICD-10-CM

## 2023-02-16 DIAGNOSIS — E78.00 HYPERCHOLESTEROLEMIA: ICD-10-CM

## 2023-02-16 DIAGNOSIS — I50.32 CHRONIC DIASTOLIC HEART FAILURE (HCC): ICD-10-CM

## 2023-02-16 DIAGNOSIS — M19.019 GLENOHUMERAL ARTHRITIS: ICD-10-CM

## 2023-02-16 DIAGNOSIS — I70.0 ATHEROSCLEROSIS OF ABDOMINAL AORTA (HCC): ICD-10-CM

## 2023-02-16 RX ORDER — ROSUVASTATIN CALCIUM 5 MG/1
5 TABLET, COATED ORAL NIGHTLY
Qty: 90 TABLET | Refills: 1 | Status: SHIPPED | OUTPATIENT
Start: 2023-02-16

## 2023-02-16 NOTE — PATIENT INSTRUCTIONS
-Please schedule the physical therapy that Dr. Mary Thao ordered.  -Do not contact home health right now. David Randolph's SCREENING SCHEDULE   Tests on this list are recommended by your physician but may not be covered, or covered at this frequency, by your insurer. Please check with your insurance carrier before scheduling to verify coverage.    PREVENTATIVE SERVICES FREQUENCY &  COVERAGE DETAILS LAST COMPLETION DATE   Diabetes Screening    Fasting Blood Sugar / Glucose    One screening every 12 months if never tested or if previously tested but not diagnosed with pre-diabetes   One screening every 6 months if diagnosed with pre-diabetes Lab Results   Component Value Date     (H) 09/19/2022        Cardiovascular Disease Screening    Lipid Panel  Cholesterol  Lipoprotein (HDL)  Triglycerides Covered every 5 years for all Medicare beneficiaries without apparent signs or symptoms of cardiovascular disease Lab Results   Component Value Date    CHOLEST 249 (H) 01/20/2023    HDL 45 01/20/2023     (H) 01/20/2023    TRIG 175 (H) 01/20/2023         Electrocardiogram (EKG)   Covered if needed at Welcome to Medicare, and non-screening if indicated for medical reasons 03/09/2018      Ultrasound Screening for Abdominal Aortic Aneurysm (AAA) Covered once in a lifetime for one of the following risk factors    Men who are 73-68 years old and have ever smoked    Anyone with a family history -     Colorectal Cancer Screening  Covered for ages 52-80; only need ONE of the following:    Colonoscopy   Covered every 10 years    Covered every 2 years if patient is at high risk or previous colonoscopy was abnormal 09/16/2014    No recommendations at this time    Flexible Sigmoidoscopy   Covered every 4 years -    Fecal Occult Blood Test Covered annually -   Prostate Cancer Screening    Prostate-Specific Antigen (PSA) Annually No results found for: PSA  There are no preventive care reminders to display for this patient.    Immunizations    Influenza Covered once per flu season  Please get every year 09/28/2022  No recommendations at this time    Pneumococcal Each vaccine (Eozbgtj11 & Huvycdtmi78) covered once after 65 Prevnar 13: 11/09/2017    Xjmsnbgrj94: 07/28/2020     No recommendations at this time    Hepatitis B One screening covered for patients with certain risk factors   -  No recommendations at this time    Tetanus Toxoid Not covered by Medicare Part B unless medically necessary (cut with metal); may be covered with your pharmacy prescription benefits -    Tetanus, Diptheria and Pertusis TD and TDaP Not covered by Medicare Part B -  No recommendations at this time    Zoster Not covered by Medicare Part B; may be covered with your pharmacy  prescription benefits -  Zoster Vaccines(1 of 2) Never done       Annual Monitoring of Persistent Medications (ACE/ARB, digoxin diuretics, anticonvulsants)    Potassium Annually Lab Results   Component Value Date    K 3.7 09/19/2022         Creatinine   Annually Lab Results   Component Value Date    CREATSERUM 0.88 09/19/2022         BUN Annually Lab Results   Component Value Date    BUN 21 (H) 09/19/2022       Drug Serum Conc Annually No results found for: DIGOXIN, DIG, VALP

## 2023-02-19 DIAGNOSIS — F03.90 DEMENTIA, UNSPECIFIED DEMENTIA SEVERITY, UNSPECIFIED DEMENTIA TYPE, UNSPECIFIED WHETHER BEHAVIORAL, PSYCHOTIC, OR MOOD DISTURBANCE OR ANXIETY (HCC): ICD-10-CM

## 2023-02-22 RX ORDER — DONEPEZIL HYDROCHLORIDE 10 MG/1
TABLET, FILM COATED ORAL
Qty: 30 TABLET | Refills: 2 | OUTPATIENT
Start: 2023-02-22

## 2023-02-26 ENCOUNTER — TELEPHONE (OUTPATIENT)
Dept: FAMILY MEDICINE CLINIC | Facility: CLINIC | Age: 88
End: 2023-02-26

## 2023-02-26 PROBLEM — N40.1 BENIGN PROSTATIC HYPERPLASIA WITH URINARY FREQUENCY: Status: RESOLVED | Noted: 2018-04-03 | Resolved: 2023-02-26

## 2023-02-26 PROBLEM — E88.09 HYPOALBUMINEMIA DUE TO PROTEIN-CALORIE MALNUTRITION (HCC): Status: ACTIVE | Noted: 2023-02-26

## 2023-02-26 PROBLEM — E46 MALNUTRITION (HCC): Status: RESOLVED | Noted: 2022-09-28 | Resolved: 2023-02-26

## 2023-02-26 PROBLEM — I70.0 ATHEROSCLEROSIS OF ABDOMINAL AORTA: Status: ACTIVE | Noted: 2023-02-26

## 2023-02-26 PROBLEM — M54.16 LUMBAR RADICULOPATHY: Status: RESOLVED | Noted: 2022-09-28 | Resolved: 2023-02-26

## 2023-02-26 PROBLEM — I73.9 SMALL VESSEL DISEASE (HCC): Status: ACTIVE | Noted: 2023-02-26

## 2023-02-26 PROBLEM — I73.9 SMALL VESSEL DISEASE: Status: ACTIVE | Noted: 2023-02-26

## 2023-02-26 PROBLEM — R35.0 BENIGN PROSTATIC HYPERPLASIA WITH URINARY FREQUENCY: Status: RESOLVED | Noted: 2018-04-03 | Resolved: 2023-02-26

## 2023-02-26 PROBLEM — F10.21 ALCOHOL USE DISORDER, MODERATE, IN EARLY REMISSION (HCC): Status: RESOLVED | Noted: 2022-09-28 | Resolved: 2023-02-26

## 2023-02-26 PROBLEM — E46 HYPOALBUMINEMIA DUE TO PROTEIN-CALORIE MALNUTRITION (HCC): Status: ACTIVE | Noted: 2023-02-26

## 2023-02-26 PROBLEM — K46.9 ABDOMINAL HERNIA WITHOUT OBSTRUCTION AND WITHOUT GANGRENE: Status: ACTIVE | Noted: 2023-02-26

## 2023-02-26 PROBLEM — I70.0 ATHEROSCLEROSIS OF ABDOMINAL AORTA (HCC): Status: ACTIVE | Noted: 2023-02-26

## 2023-02-26 PROBLEM — K80.20 CALCULUS OF GALLBLADDER WITHOUT CHOLECYSTITIS WITHOUT OBSTRUCTION: Status: ACTIVE | Noted: 2023-02-26

## 2023-02-26 PROBLEM — F10.21 ALCOHOL USE DISORDER, MODERATE, IN SUSTAINED REMISSION (HCC): Status: ACTIVE | Noted: 2023-02-26

## 2023-02-26 NOTE — TELEPHONE ENCOUNTER
Please call patient's granddaughter Addie Cochran and inform her of orders and referrals as below. Recommend complete echocardiogram as ordered 9/28/2022. Recommend make an appointment to see Dr. Bhargavi Cooper, cardiologist, complete echocardiogram prior to appointment, please note this referral was originally placed 9/28/2022. Monica Shane MD [address] [facility] [address]  [address] [facility] [phone]         Recommend patient make an appointment to see Dr. Isabelle Baldwin for abdominal hernia, Addie Cochran requested referral to see Dr. Isabelle Baldwin for the hernia. Provider Address Phone   Kami Harding MD [facility] [phone]         Recommend patient make an appointment to see Dr. Amelia Richmond, pulmonologist, originally referred to on 9/28/2022.       Provider Address Phone   Aranza Wright, [address] [facility] [phone]

## 2023-03-16 ENCOUNTER — HOSPITAL ENCOUNTER (OUTPATIENT)
Dept: CV DIAGNOSTICS | Age: 88
Discharge: HOME | End: 2023-03-16
Attending: FAMILY MEDICINE
Payer: MEDICARE

## 2023-03-16 DIAGNOSIS — I51.7 LVH (LEFT VENTRICULAR HYPERTROPHY): ICD-10-CM

## 2023-03-16 DIAGNOSIS — I05.8 MITRAL VALVE SCLEROSIS: ICD-10-CM

## 2023-03-16 DIAGNOSIS — I50.32 CHRONIC DIASTOLIC HEART FAILURE (HCC): ICD-10-CM

## 2023-03-16 DIAGNOSIS — I51.7 LAE (LEFT ATRIAL ENLARGEMENT): ICD-10-CM

## 2023-03-16 DIAGNOSIS — I35.8 AORTIC VALVE SCLEROSIS: ICD-10-CM

## 2023-03-16 DIAGNOSIS — I10 ESSENTIAL HYPERTENSION, BENIGN: ICD-10-CM

## 2023-03-16 PROCEDURE — 93306 TTE W/DOPPLER COMPLETE: CPT | Performed by: FAMILY MEDICINE

## 2023-04-07 ENCOUNTER — PATIENT OUTREACH (OUTPATIENT)
Facility: LOCATION | Age: 88
End: 2023-04-07

## 2023-04-19 NOTE — PLAN OF CARE
GASTROINTESTINAL - ADULT    • Minimal or absence of nausea and vomiting Progressing    • Maintains or returns to baseline bowel function Progressing        PAIN - ADULT    • Verbalizes/displays adequate comfort level or patient's stated pain goal Progressi Eduardo Mitchell MD  Hospitalist

## 2023-04-21 ENCOUNTER — TELEPHONE (OUTPATIENT)
Dept: NEUROLOGY | Facility: CLINIC | Age: 88
End: 2023-04-21

## 2023-04-21 DIAGNOSIS — G20 PARKINSON DISEASE (HCC): ICD-10-CM

## 2023-04-24 NOTE — TELEPHONE ENCOUNTER
Spoke with pharmacy tech, verified no verbal required, pt already picked up Rx    No further action required

## 2023-04-24 NOTE — TELEPHONE ENCOUNTER
Pt is [place] and pharmacy needs to talk to our office to release  the medication they need a verbal call.

## 2023-07-20 DIAGNOSIS — G20 PARKINSON DISEASE (HCC): ICD-10-CM

## 2023-07-20 NOTE — TELEPHONE ENCOUNTER
Medication: Carbidopa     Date of last refill: 4/21/2023 (#120/2)  [place]     Last office visit: 2/8/2023  Due back to clinic per last office note:  RTC in 6 months  Date next office visit scheduled:    No future appointments. Last OV note recommendation:    Impression/ Plan:  Gwendolynn Primrose is a 80year old man with history of HTN, HL, and colon cancer s/p chemotherapy (in remission), with previously diagnosed Parkinson's disease, who presents in follow up. Patient previously lost to follow up since 9/2021 but followed up 11/2022. Patient now on carbidopa/levodopa 25/100 mg 1 tab 4 times daily ~7 AM, 11 AM 3 PM, 7 PM and overall mild improvement in rigidity and gait but still with tremor, R more than L and bradykinesia noted. Patient previously with cognitive testing in office suggestive of dementia and recommend follow up with neuropsychology for cognitive testing. He was tried on donepezil 5 mg but had nausea and not able to tolerate though he only took for 2-3 days. I recommend obtaining neuropsychology testing to better elucidate areas of deficits vs strengths with respect to cognition rather than trying cholinesterase inhibitor therapy again; in addition, rather than changing dose of levodopa, recommend start PT with BIG and LOUD therapy and re-assess after completing program.         Patient was advised of the benefits of cardiovascular health as well as cognitive exercise and the importance of maintaining social engagements in reducing the risk of dementia. We will continue to monitor with serial exams; additionally discussed with daughter my recommendations for patient to have assistance in taking medications and need to follow through with testing and workup as ordered.       (G20) Parkinson disease ([facility]  (primary encounter diagnosis)  Plan: OP REFERRAL TO [place] [facility]        As noted above (F03.90) Dementia, unspecified dementia severity, unspecified dementia type, unspecified whether behavioral, psychotic, or mood disturbance or anxiety ([facility]  Plan: OP REFERRAL TO [place] [facility]        As noted above      Return in about 6 months (around 8/8/2023).      Karthik Tristan MD, Neurology

## 2023-07-31 DIAGNOSIS — E78.00 HYPERCHOLESTEROLEMIA: ICD-10-CM

## 2023-08-01 RX ORDER — ROSUVASTATIN CALCIUM 5 MG/1
5 TABLET, COATED ORAL NIGHTLY
Qty: 90 TABLET | Refills: 0 | Status: SHIPPED | OUTPATIENT
Start: 2023-08-01

## 2023-08-11 ENCOUNTER — TELEPHONE (OUTPATIENT)
Dept: FAMILY MEDICINE CLINIC | Facility: CLINIC | Age: 88
End: 2023-08-11

## 2023-08-11 NOTE — TELEPHONE ENCOUNTER
Unable to reach patient for medication adherence consult via [facility]  ID# [id number]. LVM for patient to call me back.

## 2023-10-29 DIAGNOSIS — G20.A1 PARKINSON DISEASE: ICD-10-CM

## 2023-10-30 NOTE — TELEPHONE ENCOUNTER
Medication: carbidopa-levodopa  MG      Date of last refill: 7/20/23 (#120/2R)  [place]     Last office visit: 2/8/23  Due back to clinic per last office note:  6 mon  Date next office visit scheduled:    No future appointments. Last OV note recommendation:    Impression/ Plan:  Bry Tavarez is a 80year old man with history of HTN, HL, and colon cancer s/p chemotherapy (in remission), with previously diagnosed Parkinson's disease, who presents in follow up. Patient previously lost to follow up since 9/2021 but followed up 11/2022. Patient now on carbidopa/levodopa 25/100 mg 1 tab 4 times daily ~7 AM, 11 AM 3 PM, 7 PM and overall mild improvement in rigidity and gait but still with tremor, R more than L and bradykinesia noted. Patient previously with cognitive testing in office suggestive of dementia and recommend follow up with neuropsychology for cognitive testing. He was tried on donepezil 5 mg but had nausea and not able to tolerate though he only took for 2-3 days. I recommend obtaining neuropsychology testing to better elucidate areas of deficits vs strengths with respect to cognition rather than trying cholinesterase inhibitor therapy again; in addition, rather than changing dose of levodopa, recommend start PT with BIG and LOUD therapy and re-assess after completing program.         Patient was advised of the benefits of cardiovascular health as well as cognitive exercise and the importance of maintaining social engagements in reducing the risk of dementia. We will continue to monitor with serial exams; additionally discussed with daughter my recommendations for patient to have assistance in taking medications and need to follow through with testing and workup as ordered.       (G20) Parkinson disease ([facility]  (primary encounter diagnosis)  Plan: [place] [facility]        As noted above (F03.90) Dementia, unspecified dementia severity, unspecified dementia type, unspecified whether behavioral, psychotic, or mood disturbance or anxiety ([facility]  Plan[place] [facility]        As noted above

## 2023-11-11 DIAGNOSIS — E78.00 HYPERCHOLESTEROLEMIA: ICD-10-CM

## 2023-11-14 RX ORDER — ROSUVASTATIN CALCIUM 5 MG/1
5 TABLET, COATED ORAL NIGHTLY
Qty: 90 TABLET | Refills: 0 | OUTPATIENT
Start: 2023-11-14

## 2023-11-14 NOTE — TELEPHONE ENCOUNTER
Requested Prescriptions     Refused Prescriptions Disp Refills    ROSUVASTATIN 5 MG Oral Tab [Pharmacy Med Name: ROSUVASTATIN 5MG TABLETS] 90 tablet 0     Sig: TAKE 1 TABLET(5 MG) BY MOUTH EVERY NIGHT     Refused By: Bhargavi Cardenas     Reason for Refusal: Have patient call the office     Patient needs to complete labs ordered in May.  [employer] message was sent in previous refill request.

## 2023-11-14 NOTE — TELEPHONE ENCOUNTER
Requested Prescriptions     Pending Prescriptions Disp Refills    ROSUVASTATIN 5 MG Oral Tab [Pharmacy Med Name: ROSUVASTATIN 5MG TABLETS] 90 tablet 0     Sig: TAKE 1 TABLET(5 MG) BY MOUTH EVERY NIGHT     Last fill was 8/1/23 #90    Last OV 2/16/23    No FOV    Labs incomplete from 5/26/23

## 2024-02-26 ENCOUNTER — APPOINTMENT (OUTPATIENT)
Dept: GENERAL RADIOLOGY | Age: 89
End: 2024-02-26
Attending: EMERGENCY MEDICINE
Payer: MEDICARE

## 2024-02-26 ENCOUNTER — OFFICE VISIT (OUTPATIENT)
Dept: FAMILY MEDICINE CLINIC | Facility: CLINIC | Age: 89
End: 2024-02-26
Payer: COMMERCIAL

## 2024-02-26 ENCOUNTER — HOSPITAL ENCOUNTER (EMERGENCY)
Age: 89
Discharge: HOME | End: 2024-02-26
Attending: EMERGENCY MEDICINE
Payer: MEDICARE

## 2024-02-26 VITALS
HEIGHT: 66 IN | RESPIRATION RATE: 18 BRPM | HEART RATE: 74 BPM | SYSTOLIC BLOOD PRESSURE: 190 MMHG | WEIGHT: 201 LBS | BODY MASS INDEX: 32.3 KG/M2 | TEMPERATURE: 98 F | DIASTOLIC BLOOD PRESSURE: 110 MMHG | OXYGEN SATURATION: 94 %

## 2024-02-26 VITALS
SYSTOLIC BLOOD PRESSURE: 183 MMHG | OXYGEN SATURATION: 94 % | TEMPERATURE: 99 F | HEIGHT: 66 IN | HEART RATE: 82 BPM | BODY MASS INDEX: 32.3 KG/M2 | WEIGHT: 201 LBS | DIASTOLIC BLOOD PRESSURE: 85 MMHG | RESPIRATION RATE: 16 BRPM

## 2024-02-26 DIAGNOSIS — R09.89: Primary | ICD-10-CM

## 2024-02-26 DIAGNOSIS — I10 UNCONTROLLED HYPERTENSION: ICD-10-CM

## 2024-02-26 DIAGNOSIS — I10 ESSENTIAL HYPERTENSION, BENIGN: ICD-10-CM

## 2024-02-26 DIAGNOSIS — E78.00 HYPERCHOLESTEROLEMIA: ICD-10-CM

## 2024-02-26 DIAGNOSIS — I35.0 AORTIC VALVE STENOSIS, ETIOLOGY OF CARDIAC VALVE DISEASE UNSPECIFIED: ICD-10-CM

## 2024-02-26 DIAGNOSIS — R60.0 BILATERAL LOWER EXTREMITY EDEMA: ICD-10-CM

## 2024-02-26 DIAGNOSIS — I50.9 CONGESTIVE HEART FAILURE, UNSPECIFIED HF CHRONICITY, UNSPECIFIED HEART FAILURE TYPE (HCC): ICD-10-CM

## 2024-02-26 DIAGNOSIS — F32.2 CURRENT SEVERE EPISODE OF MAJOR DEPRESSIVE DISORDER WITHOUT PSYCHOTIC FEATURES WITHOUT PRIOR EPISODE (HCC): ICD-10-CM

## 2024-02-26 DIAGNOSIS — R60.0 PERIPHERAL EDEMA: Primary | ICD-10-CM

## 2024-02-26 DIAGNOSIS — G20.B1 PARKINSON'S DISEASE WITH DYSKINESIA, UNSPECIFIED WHETHER MANIFESTATIONS FLUCTUATE: ICD-10-CM

## 2024-02-26 DIAGNOSIS — I50.32 CHRONIC DIASTOLIC HEART FAILURE (HCC): ICD-10-CM

## 2024-02-26 DIAGNOSIS — I51.7 LVH (LEFT VENTRICULAR HYPERTROPHY): ICD-10-CM

## 2024-02-26 DIAGNOSIS — I51.7 LEFT ATRIAL DILATION: ICD-10-CM

## 2024-02-26 DIAGNOSIS — I35.0 MILD AORTIC STENOSIS: ICD-10-CM

## 2024-02-26 LAB
ALBUMIN SERPL-MCNC: 3.3 G/DL (ref 3.4–5)
ALBUMIN/GLOB SERPL: 0.8 {RATIO} (ref 1–2)
ALP LIVER SERPL-CCNC: 82 U/L
ALT SERPL-CCNC: 12 U/L
ANION GAP SERPL CALC-SCNC: 5 MMOL/L (ref 0–18)
AST SERPL-CCNC: 13 U/L (ref 15–37)
BASOPHILS # BLD AUTO: 0.06 X10(3) UL (ref 0–0.2)
BASOPHILS NFR BLD AUTO: 0.7 %
BILIRUB SERPL-MCNC: 0.4 MG/DL (ref 0.1–2)
BUN BLD-MCNC: 15 MG/DL (ref 9–23)
CALCIUM BLD-MCNC: 8.9 MG/DL (ref 8.5–10.1)
CHLORIDE SERPL-SCNC: 104 MMOL/L (ref 98–112)
CO2 SERPL-SCNC: 28 MMOL/L (ref 21–32)
CREAT BLD-MCNC: 0.98 MG/DL
EGFRCR SERPLBLD CKD-EPI 2021: 74 ML/MIN/1.73M2 (ref 60–?)
EOSINOPHIL # BLD AUTO: 0.03 X10(3) UL (ref 0–0.7)
EOSINOPHIL NFR BLD AUTO: 0.3 %
ERYTHROCYTE [DISTWIDTH] IN BLOOD BY AUTOMATED COUNT: 12.9 %
GLOBULIN PLAS-MCNC: 4.4 G/DL (ref 2.8–4.4)
GLUCOSE BLD-MCNC: 135 MG/DL (ref 70–99)
HCT VFR BLD AUTO: 46.6 %
HGB BLD-MCNC: 15.7 G/DL
IMM GRANULOCYTES # BLD AUTO: 0.03 X10(3) UL (ref 0–1)
IMM GRANULOCYTES NFR BLD: 0.3 %
LYMPHOCYTES # BLD AUTO: 1.41 X10(3) UL (ref 1–4)
LYMPHOCYTES NFR BLD AUTO: 16.4 %
MCH RBC QN AUTO: 31.5 PG (ref 26–34)
MCHC RBC AUTO-ENTMCNC: 33.7 G/DL (ref 31–37)
MCV RBC AUTO: 93.6 FL
MONOCYTES # BLD AUTO: 0.76 X10(3) UL (ref 0.1–1)
MONOCYTES NFR BLD AUTO: 8.8 %
NEUTROPHILS # BLD AUTO: 6.32 X10 (3) UL (ref 1.5–7.7)
NEUTROPHILS # BLD AUTO: 6.32 X10(3) UL (ref 1.5–7.7)
NEUTROPHILS NFR BLD AUTO: 73.5 %
NT-PROBNP SERPL-MCNC: 451 PG/ML (ref ?–450)
OSMOLALITY SERPL CALC.SUM OF ELEC: 287 MOSM/KG (ref 275–295)
PLATELET # BLD AUTO: 198 10(3)UL (ref 150–450)
POTASSIUM SERPL-SCNC: 3.9 MMOL/L (ref 3.5–5.1)
PROT SERPL-MCNC: 7.7 G/DL (ref 6.4–8.2)
RBC # BLD AUTO: 4.98 X10(6)UL
SODIUM SERPL-SCNC: 137 MMOL/L (ref 136–145)
TROPONIN I SERPL HS-MCNC: 19 NG/L
WBC # BLD AUTO: 8.6 X10(3) UL (ref 4–11)

## 2024-02-26 PROCEDURE — 99284 EMERGENCY DEPT VISIT MOD MDM: CPT

## 2024-02-26 PROCEDURE — 84484 ASSAY OF TROPONIN QUANT: CPT | Performed by: EMERGENCY MEDICINE

## 2024-02-26 PROCEDURE — 1160F RVW MEDS BY RX/DR IN RCRD: CPT | Performed by: FAMILY MEDICINE

## 2024-02-26 PROCEDURE — 3077F SYST BP >= 140 MM HG: CPT | Performed by: FAMILY MEDICINE

## 2024-02-26 PROCEDURE — 1170F FXNL STATUS ASSESSED: CPT | Performed by: FAMILY MEDICINE

## 2024-02-26 PROCEDURE — 85025 COMPLETE CBC W/AUTO DIFF WBC: CPT | Performed by: EMERGENCY MEDICINE

## 2024-02-26 PROCEDURE — 3080F DIAST BP >= 90 MM HG: CPT | Performed by: FAMILY MEDICINE

## 2024-02-26 PROCEDURE — 99215 OFFICE O/P EST HI 40 MIN: CPT | Performed by: FAMILY MEDICINE

## 2024-02-26 PROCEDURE — 71045 X-RAY EXAM CHEST 1 VIEW: CPT | Performed by: EMERGENCY MEDICINE

## 2024-02-26 PROCEDURE — 1159F MED LIST DOCD IN RCRD: CPT | Performed by: FAMILY MEDICINE

## 2024-02-26 PROCEDURE — 83880 ASSAY OF NATRIURETIC PEPTIDE: CPT | Performed by: EMERGENCY MEDICINE

## 2024-02-26 PROCEDURE — 80053 COMPREHEN METABOLIC PANEL: CPT | Performed by: EMERGENCY MEDICINE

## 2024-02-26 PROCEDURE — 93010 ELECTROCARDIOGRAM REPORT: CPT

## 2024-02-26 PROCEDURE — 1125F AMNT PAIN NOTED PAIN PRSNT: CPT | Performed by: FAMILY MEDICINE

## 2024-02-26 PROCEDURE — 3008F BODY MASS INDEX DOCD: CPT | Performed by: FAMILY MEDICINE

## 2024-02-26 PROCEDURE — 93005 ELECTROCARDIOGRAM TRACING: CPT

## 2024-02-26 PROCEDURE — 96374 THER/PROPH/DIAG INJ IV PUSH: CPT

## 2024-02-26 RX ORDER — FUROSEMIDE 10 MG/ML
40 INJECTION INTRAMUSCULAR; INTRAVENOUS ONCE
Status: COMPLETED | OUTPATIENT
Start: 2024-02-26 | End: 2024-02-26

## 2024-02-26 RX ORDER — AMLODIPINE BESYLATE 10 MG/1
10 TABLET ORAL NIGHTLY
Qty: 90 TABLET | Refills: 0 | Status: SHIPPED | OUTPATIENT
Start: 2024-02-26

## 2024-02-26 RX ORDER — LISINOPRIL 20 MG/1
20 TABLET ORAL NIGHTLY
Qty: 90 TABLET | Refills: 0 | Status: SHIPPED | OUTPATIENT
Start: 2024-02-26

## 2024-02-26 RX ORDER — ROSUVASTATIN CALCIUM 5 MG/1
5 TABLET, COATED ORAL NIGHTLY
Qty: 90 TABLET | Refills: 0 | Status: SHIPPED | OUTPATIENT
Start: 2024-02-26

## 2024-02-26 NOTE — ED INITIAL ASSESSMENT (HPI)
Patient sent from primary physician for further evaluation. Family states the physician noticed elevated blood pressure and leg swelling. Patient denies chest pain or difficulty breathing.

## 2024-02-26 NOTE — PROGRESS NOTES
Benjy Randolph is a 89 year old male.     Chief Complaint   Patient presents with    Hypertension    Depression    Parkinson's         HPI:     Patient is accompanied by his daughter-in-law who is very pleasant and supportive.        Hypertension:  Uncontrolled.   Has been off of medication for over 6 months  Diet: No particular diet being followed               Exercise: Patient is sedentary.        Depression/anxiety:     2/26/2024   PHQ9 FLOWSHEET    Little interest or pleasure in doing things 3    Little interest or pleasure in doing things 3    Feeling down, depressed, or hopeless 3    Feeling down, depressed, or hopeless 3    Trouble falling or staying asleep, or sleeping too much 3    Trouble falling or staying asleep, or sleeping too much 3    Feeling tired or having little energy 3    Feeling tired or having little energy 3    Poor appetite or overeating 0    Poor appetite or overeating 0    Feeling bad about yourself - or that you are a failure or have let yourself or your family down 0    Feeling bad about yourself - or that you are a failure or have let yourself or your family down 0    Trouble concentrating on things, such as reading the newspaper or watching television 3    Trouble concentrating on things, such as reading the newspaper or watching television 3    Moving or speaking so slowly that other people could have noticed. Or the opposite - being so fidgety or restless that you have been moving around a lot more than usual 0    Moving or speaking so slowly that other people could have noticed. Or the opposite - being so fidgety or restless that you have been moving around a lot more than usual 0    Thoughts that you would be better off dead, or of hurting yourself in some way 3    Thoughts that you would be better off dead, or of hurting yourself in some way 3    PHQ-9 TOTAL SCORE 18    PHQ-9 TOTAL SCORE 18    If you checked off any problems, how difficult have these problems made it  for you to do your work, take care of things at home, or get along with other people? Very difficult    If you checked off any problems, how difficult have these problems made it for you to do your work, take care of things at home, or get along with other people? Very difficult              Wt Readings from Last 6 Encounters:   02/26/24 201 lb (91.2 kg)   02/26/24 201 lb (91.2 kg)   02/16/23 210 lb 9.6 oz (95.5 kg)   02/08/23 209 lb (94.8 kg)   01/19/23 209 lb (94.8 kg)   11/16/22 200 lb (90.7 kg)      Body mass index is 32.44 kg/m².        Current Outpatient Medications   Medication Sig Dispense Refill    amLODIPine 10 MG Oral Tab Take 1 tablet (10 mg total) by mouth at bedtime. 90 tablet 0    lisinopril 20 MG Oral Tab Take 1 tablet (20 mg total) by mouth at bedtime. 90 tablet 0    rosuvastatin 5 MG Oral Tab Take 1 tablet (5 mg total) by mouth nightly. 90 tablet 0    carbidopa-levodopa  MG Oral Tab TAKE 1 TABLET BY MOUTH FOUR TIMES DAILY(7AM, 11AM, 3PM, 7PM) 120 tablet 2    acetaminophen 325 MG Oral Tab Take 1 tablet (325 mg total) by mouth every 6 (six) hours as needed for Pain.        Past Medical History:   Diagnosis Date    Abdominal pain of unknown etiology 10/17/2020    Alcoholism /alcohol abuse 8/30/2017    Episodic    Anesthesia complication     difficulty awakening after a surgery many years ago- needed defibrillator shock to come out per daughter,    Aortic sclerosis 2/13/2015    Aortic valve sclerosis 9/29/2015    Arthritis     Atrophic gastritis 9-    chronic, inactive    Basal cell carcinoma (BCC) 8/4/2020    BPH (benign prostatic hyperplasia)     Cancer (HCC)     skin cancer ? kind    Cataract     HAD SURGERY- ??HAS LENS IMPLANTS    Change in vision 8/4/2020    Chronic diastolic heart failure (HCC) 10/24/2020    Colon cancer (HCC) 2010    Colon polyps 9-    COVID-19 virus infection 11/24/2020    Dermatitis 10/26/2018    New. Right forearm    EKG, abnormal 2/13/2015    Elevated  hemoglobin (HCC) 4/16/2018    Esophageal reflux     no longer a problem/ resolved    Essential hypertension, benign 2/5/2015    Uncontrolled     Heart murmur     Herpes zoster without complication 10/24/2020    High cholesterol     History of cardiac murmur     History of colon cancer 8/4/2020    History of hernia repair 2/11/2015    First in [place], then in [place]     History of skin cancer 9/27/2016    Hypertensive urgency     Malignant neoplasm of colon (HCC) 2/27/2012    Murmur, heart     benign    Osteoarthritis     right shouder- surgery scheduled 06/30/16, also left shoulder    Other and unspecified hyperlipidemia     Parkinson disease 2014    Personal history of antineoplastic chemotherapy     2010    Pneumonia due to COVID-19 virus 12/11/2020    PONV (postoperative nausea and vomiting)     Postherpetic neuralgia 11/16/2020    Prurigo 8/4/2020    Retained suture 9/29/2015    S/P repair of ventral hernia 3/30/2018    SBO (small bowel obstruction) ([facility]) 3/30/2018    Shoulder injury 1987    left     Suture granuloma 10/3/2017    Tubular adenoma 9-    Uncomfortable fullness after meals     Ventral hernia without obstruction or gangrene 3/21/2018    Ventral incisional hernia 11/2/2017      Past Surgical History:   Procedure Laterality Date    APPENDECTOMY      ARTHROSCOPY OF JOINT UNLISTED Bilateral     SURGERY ON BOTH SHOULDERS - UNSURE IF SCOPE OR OPEN    CATARACT Bilateral     COLECTOMY      COLONOSCOPY  9-    Dr. Priscilla [place], [place]    HERNIA SURGERY      SKIN SURGERY Left 4-14-15    MMS done for SCC, well dif, inv to left antihelix, AB    SKIN SURGERY  5/4/2015    MMS - BCC to the Left Jehovah's witness     SKIN SURGERY  3/29/17    MMS- BCC-micronod to right nasofacial sulcus done by AB    UPPER GI ENDOSCOPY,EXAM        Social History:    Social History     Socioeconomic History    Marital status:    Tobacco Use    Smoking status: Former     Packs/day: 0.25     Years: 67.00     Additional pack years:  0.00     Total pack years: 16.75     Types: Cigarettes     Quit date: 3/10/2015     Years since quittin.9    Smokeless tobacco: Never   Vaping Use    Vaping Use: Never used   Substance and Sexual Activity    Alcohol use: Yes     Comment: socially    Drug use: No   Other Topics Concern     Service No    Blood Transfusions No    Caffeine Concern Yes     Comment: 2 cups of coffee daily. 1 soda weekly    Occupational Exposure No    Hobby Hazards No    Sleep Concern Yes    Stress Concern No    Weight Concern No    Special Diet No    Back Care No    Exercise Yes     Comment: Daily walking    Bike Helmet No    Seat Belt Yes    Self-Exams No         Patient Active Problem List   Diagnosis    Parkinson disease    Essential hypertension, benign    Heart murmur    Bilateral lower extremity edema    Right shoulder pain    Glenohumeral arthritis - right    Asymptomatic LV dysfunction    Aortic valve sclerosis    Hypercholesterolemia    Hyperglycemia    LVH (left ventricular hypertrophy)    Left atrial dilation    Mitral valve sclerosis    Chronic diastolic heart failure (HCC)    Primary osteoarthritis of first carpometacarpal joint of right hand    Ground glass opacity present on imaging of lung    Benign prostatic hyperplasia    Prediabetes    Retrolisthesis    History of skin cancer    Advanced age    Frail elderly    Lumbar back pain with radiculopathy affecting right lower extremity    Difficulty walking    Neoplasm of uncertain behavior of skin    Alcohol use disorder, moderate, in sustained remission (HCC)    Small vessel disease (HCC)    Abdominal hernia without obstruction and without gangrene    Hypoalbuminemia due to protein-calorie malnutrition (HCC)    Atherosclerosis of abdominal aorta (HCC)    Calculus of gallbladder without cholecystitis without obstruction    Uncontrolled hypertension    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)    Mild  aortic stenosis         Family History   Problem Relation Age of Onset    Heart Attack Sister     Cancer Neg     Heart Disease Neg     Stroke Neg      REVIEW OF SYSTEMS:   GENERAL HEALTH: Overall feels \"okay\"  SKIN: denies any unusual skin lesions or rashes   RESPIRATORY: Denies cough  CARDIOVASCULAR: Denies CP/palpitations  VASCULAR: Has noticed some swelling of the legs  GI: Denies abdominal pain/nausea/vomiting/blood in stool/black stool/bloating/constipation/diarrhea  : denies urinary symptoms  NEURO: denies headaches/dizziness  PSYCH: denies SI/HI    Immunization History   Administered Date(s) Administered    >=3 YRS FLUZONE OR FLUARIX QUAD PRESERVE FREE SINGLE DOSE (06799) FLU CLINIC 09/24/2015    Covid-19 Vaccine Sinovac-coronavac 0.5ml 03/14/2021, 04/20/2021    FLU VAC High Dose 65 YRS & Older PRSV Free (61333) 09/27/2016, 01/28/2020, 12/16/2021, 09/28/2022    FLUAD High Dose 65 yr and older (34607) 09/11/2017    FLUZONE 6 months and older PFS 0.5 ml (72565) 09/24/2015    Fluvirin, 3 Years & >, Im 01/22/2015    Fluzone Vaccine Medicare () 09/20/2013, 09/27/2016, 01/28/2020    Pneumococcal (Prevnar 13) 11/09/2017    Pneumovax 23 07/28/2020   Deferred Date(s) Deferred    FLU VAC High Dose 65 YRS & Older PRSV Free (44823) 10/18/2020       EXAM:   BP (!) 190/110   Pulse 74   Temp 98.3 °F (36.8 °C) (Temporal)   Resp 18   Ht 5' 6\" (1.676 m)   Wt 201 lb (91.2 kg)   SpO2 94%   BMI 32.44 kg/m²   GENERAL: NAD, pleasant elderly male  SKIN: no visible rashes  HEAD: NCAT  NECK: supple, no adenopathy, no thyromegaly, no masses  LUNGS: Anteriorly clear.  Posteriorly bilaterally crackles at least group home up.  No rhonchi.  No wheezing.  CARDIO: RRR, +S1/S2.  2/6 to 3/6 PRESLEY best heard RSB second ICS.  VASCULAR: Approximately 1+ bilateral pretibial pitting edema  GI: NT/ND, no pulsations, no r/r/g, no masses appreciated, no HSM appreciated  EXTREMITIES: no cyanosis or clubbing  NEURO: Alert and Oriented x3.   Positive for resting tremor of upper extremities  PSYCH: Affect flat Monika.        DATA:    Diabetes:    Lab Results   Component Value Date    A1C 6.4 (A) 09/28/2022    A1C 5.4 10/17/2020    A1C 5.8 (H) 11/08/2018     10/17/2020     11/08/2018     (H) 03/05/2018     Lab Results   Component Value Date    CHOLEST 249 (H) 01/20/2023    CHOLEST 200 (H) 10/24/2020    CHOLEST 221 (H) 11/08/2018     Lab Results   Component Value Date    HDL 45 01/20/2023    HDL 43 10/24/2020    HDL 43 11/08/2018     Lab Results   Component Value Date     (H) 01/20/2023     (H) 10/24/2020     (H) 11/08/2018     Lab Results   Component Value Date    TRIG 175 (H) 01/20/2023    TRIG 141 10/24/2020    TRIG 144 11/08/2018     Lab Results   Component Value Date    AST 13 (L) 02/26/2024    AST 18 09/19/2022    AST 24 09/07/2022     Lab Results   Component Value Date    ALT 12 (L) 02/26/2024    ALT 10 (L) 09/19/2022    ALT 17 09/07/2022           ASSESSMENT AND PLAN:       Encounter Diagnoses   Name Primary?    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  Rest of time was spent reviewing chart, reviewing blood work and radiology tests.       Patient advised to go to the [facility] emergency department in [place] for further evaluation and care.  Patient will be driven by his granddaughter Lizbeth to the  [facility] emergency department, I spoke with patient's granddaughter Lizbeth over the phone while she was in our parking lot waiting for patient to complete his visit.  [facility] emergency department provider notified.  Patient discharged in stable condition.    (Cardiopulmonary)  1. Respiratory crackles 1/2 way up posterior chest wall on both sides  2. Chronic diastolic heart failure (HCC)  3. LVH (left ventricular hypertrophy)  4. Left atrial dilation  5. Bilateral lower extremity edema  6. Uncontrolled hypertension  7. Mild aortic stenosis  Patient has been off of antihypertensives for greater than 6 months.  Patient has also been off of rosuvastatin for over 6 months.    Patient advised to go to the [facility] emergency department in [place] for further evaluation and care.  Patient will be driven by his granddaughter Lizbeth to the [facility] emergency department.  [facility] emergency department provider notified.  Patient discharged in stable condition.    8. Essential hypertension, benign  Uncontrolled.  Recommend restart amlodipine and lisinopril as below.  Patient may also need diuretic, patient to be assessed at the emergency department, please see above.    - amLODIPine 10 MG Oral Tab; Take 1 tablet (10 mg total) by mouth at bedtime.  Dispense: 90 tablet; Refill: 0  - lisinopril 20 MG Oral Tab; Take 1 tablet (20 mg total) by mouth at bedtime.  Dispense: 90 tablet; Refill: 0    9. 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    10. Hypercholesterolemia  Recommend restart rosuvastatin.  Labs to be ordered at time of patient's follow-up visit which should be in approximately 2 to 3 weeks.    - rosuvastatin 5 MG Oral Tab; Take 1 tablet (5 mg total) by mouth  nightly.  Dispense: 90 tablet; Refill: 0    11. Parkinson's disease with dyskinesia, unspecified whether manifestations fluctuate  Patient referred to Dr. Devine for further evaluation and care.    - Neuro Referral - In Network            Meds & Refills for this Visit:  Requested Prescriptions     Signed Prescriptions Disp Refills    amLODIPine 10 MG Oral Tab 90 tablet 0     Sig: Take 1 tablet (10 mg total) by mouth at bedtime.    lisinopril 20 MG Oral Tab 90 tablet 0     Sig: Take 1 tablet (20 mg total) by mouth at bedtime.    rosuvastatin 5 MG Oral Tab 90 tablet 0     Sig: Take 1 tablet (5 mg total) by mouth nightly.       Imaging & Consults:  NEURO - INTERNAL    Return in about 2 weeks (around 3/11/2024) for Visit/Medicare annual wellness visit.

## 2024-02-27 LAB
ATRIAL RATE: 74 BPM
P AXIS: 59 DEGREES
P-R INTERVAL: 224 MS
Q-T INTERVAL: 430 MS
QRS DURATION: 160 MS
QTC CALCULATION (BEZET): 477 MS
R AXIS: -65 DEGREES
T AXIS: 13 DEGREES
VENTRICULAR RATE: 74 BPM

## 2024-02-27 NOTE — DISCHARGE INSTRUCTIONS
Be sure and start your blood pressure medications as soon as possible.  Check your blood pressure at home to be certain that it normalizes.  Stay well-hydrated.  Consider reducing salt in your diet and keeping your legs elevated.

## 2024-02-27 NOTE — ED PROVIDER NOTES
Patient Seen in: [facility] In [place]      History     Chief Complaint   Patient presents with    Hypertension     Stated Complaint: HTN    Subjective:   HPI    89-year-old male presents to the emergency department after being seen by his primary care physician.  He had gone to his primary care physician's office for medication refill.  He had been in [place]  He had run out of his medications and had not told his family.  He denies any complaints but when examined by the physician he was found to have peripheral edema and some rales.  He subsequently was sent to the emergency department for further evaluation.  He is also noted to have elevated blood pressure of 190/110.  Patient denies any headache visual disturbance nausea or vomiting    Objective:   Past Medical History:   Diagnosis Date    Abdominal pain of unknown etiology 10/17/2020    Alcoholism /alcohol abuse 8/30/2017    Episodic    Anesthesia complication     difficulty awakening after a surgery many years ago- needed defibrillator shock to come out per daughter,    Aortic sclerosis 2/13/2015    Aortic valve sclerosis 9/29/2015    Arthritis     Atrophic gastritis 9-    chronic, inactive    Basal cell carcinoma (BCC) 8/4/2020    BPH (benign prostatic hyperplasia)     Cancer (HCC)     skin cancer ? kind    Cataract     HAD SURGERY- ??HAS LENS IMPLANTS    Change in vision 8/4/2020    Chronic diastolic heart failure (HCC) 10/24/2020    Colon cancer (HCC) 2010    Colon polyps 9-    COVID-19 virus infection 11/24/2020    Dermatitis 10/26/2018    New. Right forearm    EKG, abnormal 2/13/2015    Elevated hemoglobin (HCC) 4/16/2018    Esophageal reflux     no longer a problem/ resolved    Essential hypertension, benign 2/5/2015    Uncontrolled     Heart murmur     Herpes zoster without complication 10/24/2020    High cholesterol     History of cardiac murmur     History  of colon cancer 2020    History of hernia repair 2015    [place] in [place]     History of skin cancer 2016    Hypertensive urgency     Malignant neoplasm of colon (HCC) 2012    Murmur, heart     benign    Osteoarthritis     right shouder- surgery scheduled 16, also left shoulder    Other and unspecified hyperlipidemia     Parkinson disease     Personal history of antineoplastic chemotherapy         Pneumonia due to COVID-19 virus 2020    PONV (postoperative nausea and vomiting)     Postherpetic neuralgia 2020    Prurigo 2020    Retained suture 2015    S/P repair of ventral hernia 3/30/2018    SBO (small bowel obstruction) (HCC) 3/30/2018    Shoulder injury 1987    left     Suture granuloma 10/3/2017    Tubular adenoma 2014    Uncomfortable fullness after meals     Ventral hernia without obstruction or gangrene 3/21/2018    Ventral incisional hernia 2017              Past Surgical History:   Procedure Laterality Date    APPENDECTOMY      ARTHROSCOPY OF JOINT UNLISTED Bilateral     SURGERY ON BOTH SHOULDERS - UNSURE IF SCOPE OR OPEN    CATARACT Bilateral     COLECTOMY      COLONOSCOPY  2014    Dr. Priscilla [place], [place]    HERNIA SURGERY      SKIN SURGERY Left 4-14-15    MMS done for SCC, well dif, inv to left antihelix, AB    SKIN SURGERY  2015    MMS - BCC to the Left [place]     SKIN SURGERY  3/29/17    MMS- BCC-micronod to right nasofacial sulcus done by AB    UPPER GI ENDOSCOPY,EXAM                  Social History     Socioeconomic History    Marital status:    Tobacco Use    Smoking status: Former     Packs/day: 0.25     Years: 67.00     Additional pack years: 0.00     Total pack years: 16.75     Types: Cigarettes     Quit date: 3/10/2015     Years since quittin.9    Smokeless tobacco: Never   Vaping Use    Vaping Use: Never used   Substance and Sexual Activity    Alcohol use: Yes     Comment: socially    Drug use: No    Other Topics Concern     Service No    Blood Transfusions No    Caffeine Concern Yes     Comment: 2 cups of coffee daily. 1 soda weekly    Occupational Exposure No    Hobby Hazards No    Sleep Concern Yes    Stress Concern No    Weight Concern No    Special Diet No    Back Care No    Exercise Yes     Comment: Daily walking    Bike Helmet No    Seat Belt Yes    Self-Exams No              Review of Systems   All other systems reviewed and are negative.      Positive for stated complaint: HTN  Other systems are as noted in HPI.  Constitutional and vital signs reviewed.      All other systems reviewed and negative except as noted above.    Physical Exam     ED Triage Vitals [02/26/24 1645]   BP (!) 200/83   Pulse 75   Resp 20   Temp 98.7 °F (37.1 °C)   Temp src Oral   SpO2 92 %   O2 Device None (Room air)       Current:BP (!) 183/85   Pulse 82   Temp 98.7 °F (37.1 °C) (Oral)   Resp 16   Ht 167.6 cm (5' 6\")   Wt 91.2 kg   SpO2 94%   BMI 32.44 kg/m²         Physical Exam  Vitals and nursing note reviewed.   Constitutional:       General: He is not in acute distress.     Appearance: Normal appearance. He is well-developed.   HENT:      Head: Normocephalic and atraumatic.   Cardiovascular:      Rate and Rhythm: Normal rate and regular rhythm.      Pulses: Normal pulses.      Heart sounds: Murmur heard.      Comments: Systolic murmur noted  Pulmonary:      Effort: Pulmonary effort is normal. No respiratory distress.      Breath sounds: No stridor. Rales present.   Abdominal:      General: Bowel sounds are normal.      Palpations: Abdomen is soft.   Musculoskeletal:         General: Normal range of motion.      Cervical back: Normal range of motion and neck supple.      Right lower leg: Edema present.      Left lower leg: Edema present.      Comments: No calf tenderness, negative Homans, 1-2+ pitting edema   Lymphadenopathy:      Cervical: No cervical adenopathy.   Skin:     General: Skin is warm and dry.    Neurological:      General: No focal deficit present.      Mental Status: He is alert and oriented to person, place, and time.              ED Course     Labs Reviewed   COMP METABOLIC PANEL (14) - Abnormal; Notable for the following components:       Result Value    Glucose 135 (*)     AST 13 (*)     ALT 12 (*)     Albumin 3.3 (*)     A/G Ratio 0.8 (*)     All other components within normal limits   PRO BETA NATRIURETIC PEPTIDE - Abnormal; Notable for the following components:    Pro-Beta Natriuretic Peptide 451 (*)     All other components within normal limits   TROPONIN I HIGH SENSITIVITY - Normal   CBC WITH DIFFERENTIAL WITH PLATELET    Narrative:     The following orders were created for panel order CBC With Differential With Platelet.  Procedure                               Abnormality         Status                     ---------                               -----------         ------                     CBC W/ DIFFERENTIAL[[id number]]                              Final result                 Please view results for these tests on the individual orders.   CBC W/ DIFFERENTIAL     EKG    Rate, intervals and axes as noted on EKG Report.  Rate: 74  Rhythm: Sinus Rhythm  Reading: First-degree AV block increased voltage overall bifascicular block no interval change from EKG October 2017                 XR CHEST AP PORTABLE  (CPT=71045)    Result Date: 2/26/2024  PROCEDURE:  XR CHEST AP PORTABLE  (CPT=71045)  TECHNIQUE:  AP chest radiograph was obtained.  COMPARISON:  None.  INDICATIONS:  HTN  PATIENT STATED HISTORY: (As transcribed by Technologist)  Sudden onset high blood pressure today.    FINDINGS:  Heart size is mildly enlarged.  Pleural spaces appear clear.  Mediastinal and hilar contours are normal.  No focal consolidation.  A background of mild vascular congestion and volume overload is present.            CONCLUSION:  See above.   LOCATION:  [facility]      Dictated by (CST): Omi Jackson MD on 2/26/2024 at  6:20 PM     Finalized by (CST): Omi Jackson MD on 2/26/2024 at 6:21 PM              [facility]      Patient IV established and blood work obtained.  I reviewed his previous records including an echocardiogram from last year.  He has noted to have some mild aortic stenosis but has a normal ejection fraction.  His EKG was compared to previous and there is been no interval change.  He has no active chest pain and denies any shortness of breath.  Patient does have signs of fluid overload but his proBNP is only mildly elevated.  He was given a dose of Lasix.  I suspect that his uncontrolled hypertension is most likely most of the etiology of his peripheral edema and otherwise symptoms although he may require further evaluation of his aortic stenosis is a possible etiology but currently O2 saturations are in the mid 90s he is comfortable.  There is no acute changes on his EKG.  He has not had any elevated troponins.  No active chest pain or chest discomfort.  He is to restart his blood pressure medications and monitor his blood pressure medications.  He should follow-up closely with his primary care physician.  It is not felt that this time patient requires emergent inpatient admission.  Patient was discharged in good condition      I did review all of the results with the patient's granddaughter as well.  She states that he tends to be quite noncompliant.  We discussed that most likely his poorly controlled hypertension is the reason for his fluid overload.  He is to restart his antihypertensives and we discussed using SERENITY hose and reducing sodium in the diet and following up with the primary care physician.  They understand and patient was discharged                                 Medical Decision Making      Disposition and Plan     Clinical Impression:  1. Peripheral edema    2. Uncontrolled hypertension    3. Aortic valve stenosis, etiology of cardiac valve disease unspecified    4. Congestive heart failure, unspecified HF  chronicity, unspecified heart failure type (HCC)         Disposition:  Discharge  2/26/2024  7:09 pm    Follow-up:  Chyna Pascual DO  [id number] [facility] [address]  [facility] [address] [phone]    Schedule an appointment as soon as possible for a visit            Medications Prescribed:  Discharge Medication List as of 2/26/2024  7:10 PM

## 2024-03-03 ENCOUNTER — TELEPHONE (OUTPATIENT)
Dept: FAMILY MEDICINE CLINIC | Facility: CLINIC | Age: 89
End: 2024-03-03

## 2024-03-03 DIAGNOSIS — I51.7 LEFT ATRIAL DILATION: ICD-10-CM

## 2024-03-03 DIAGNOSIS — I05.8 MITRAL VALVE SCLEROSIS: ICD-10-CM

## 2024-03-03 DIAGNOSIS — I51.7 LVH (LEFT VENTRICULAR HYPERTROPHY): ICD-10-CM

## 2024-03-03 DIAGNOSIS — I35.0 MILD AORTIC STENOSIS: ICD-10-CM

## 2024-03-03 DIAGNOSIS — I50.9 ACUTE ON CHRONIC HEART FAILURE, UNSPECIFIED HEART FAILURE TYPE (HCC): Primary | ICD-10-CM

## 2024-03-03 PROBLEM — F32.2 CURRENT SEVERE EPISODE OF MAJOR DEPRESSIVE DISORDER WITHOUT PSYCHOTIC FEATURES WITHOUT PRIOR EPISODE (HCC): Status: ACTIVE | Noted: 2024-03-03

## 2024-03-03 PROBLEM — I10 UNCONTROLLED HYPERTENSION: Status: ACTIVE | Noted: 2024-03-03

## 2024-03-03 NOTE — TELEPHONE ENCOUNTER
Please call and speak to one of pt's family members, (patient has memory issues) recommend either speak to his granddaughter Lizbeth which is preferable or speak to his daughter-in-law Diana.  Recommend patient see cardiologist, Dr. Werner, or one of his colleagues, please see contact information below.  Patient still needs to make an appointment to see Dr. Devine, he may be able to get in to see her sooner at the [place] location and then subsequent follow-up visits at the [facility].  Referral to cardiologist has been placed for tracking purposes.    Provider Address Phone   Silvano Werner MD [address] [place] [id number] [phone]

## 2024-03-04 NOTE — TELEPHONE ENCOUNTER
Spoke w/Russel and gave instructions and number to call for appt. Reminded her to schedule Dr. Devine as well. Russel verbalized agreement and understanding.

## 2024-03-20 ENCOUNTER — TELEPHONE (OUTPATIENT)
Dept: FAMILY MEDICINE CLINIC | Facility: CLINIC | Age: 89
End: 2024-03-20

## 2024-03-20 NOTE — TELEPHONE ENCOUNTER
Shahida  from Home Health called in regards to patient Benjy Rodriguezindo. Wanted to inform  about patient being taken to [facility] in [place] patient was discharged 3/14 and taking to patient's sons home. Representative is requesting a verbal agreement from provider to continue care after first visit for patient.   [phone]

## 2024-03-21 ENCOUNTER — TELEPHONE (OUTPATIENT)
Dept: NEUROLOGY | Facility: CLINIC | Age: 89
End: 2024-03-21

## 2024-03-21 NOTE — TELEPHONE ENCOUNTER
Yes, okay for verbal for home health.  Please have discharge summary from the hospital sent to us if not in epic.  Thank you.

## 2024-03-21 NOTE — TELEPHONE ENCOUNTER
Notes are in Epic. No need to send to scanning.    Notes placed on provider desk for review. This is a hospital discharge summary.

## 2024-03-21 NOTE — TELEPHONE ENCOUNTER
Spoke w/Shahida. She will fax over all the information they have including the requested hospital discharge summary.

## 2024-04-02 ENCOUNTER — APPOINTMENT (OUTPATIENT)
Dept: ULTRASOUND IMAGING | Facility: HOSPITAL | Age: 89
End: 2024-04-02
Attending: EMERGENCY MEDICINE
Payer: MEDICARE

## 2024-04-02 ENCOUNTER — TELEPHONE (OUTPATIENT)
Dept: FAMILY MEDICINE CLINIC | Facility: CLINIC | Age: 89
End: 2024-04-02

## 2024-04-02 ENCOUNTER — HOSPITAL ENCOUNTER (INPATIENT)
Facility: HOSPITAL | Age: 89
LOS: 3 days | Discharge: HOME HEALTH | End: 2024-04-05
Attending: EMERGENCY MEDICINE | Admitting: INTERNAL MEDICINE
Payer: MEDICARE

## 2024-04-02 ENCOUNTER — HOSPITAL ENCOUNTER (INPATIENT)
Facility: HOSPITAL | Age: 89
LOS: 3 days | Discharge: HOME | End: 2024-04-05
Attending: EMERGENCY MEDICINE | Admitting: INTERNAL MEDICINE
Payer: MEDICARE

## 2024-04-02 DIAGNOSIS — M10.9 POLYARTICULAR GOUT: ICD-10-CM

## 2024-04-02 DIAGNOSIS — R79.82 ELEVATED C-REACTIVE PROTEIN (CRP): ICD-10-CM

## 2024-04-02 DIAGNOSIS — M19.029 ELBOW ARTHRITIS: ICD-10-CM

## 2024-04-02 DIAGNOSIS — R70.0 ELEVATED SED RATE: ICD-10-CM

## 2024-04-02 DIAGNOSIS — M19.039 WRIST ARTHRITIS: ICD-10-CM

## 2024-04-02 DIAGNOSIS — L03.113 CELLULITIS OF RIGHT UPPER EXTREMITY: Primary | ICD-10-CM

## 2024-04-02 DIAGNOSIS — M75.01 ADHESIVE CAPSULITIS OF RIGHT SHOULDER: ICD-10-CM

## 2024-04-02 LAB
ALBUMIN SERPL-MCNC: 3 G/DL (ref 3.4–5)
ALBUMIN/GLOB SERPL: 0.6 {RATIO} (ref 1–2)
ALP LIVER SERPL-CCNC: 100 U/L
ALT SERPL-CCNC: 12 U/L
ANION GAP SERPL CALC-SCNC: 6 MMOL/L (ref 0–18)
AST SERPL-CCNC: 19 U/L (ref 15–37)
BASOPHILS # BLD AUTO: 0.07 X10(3) UL (ref 0–0.2)
BASOPHILS NFR BLD AUTO: 0.7 %
BILIRUB SERPL-MCNC: 0.3 MG/DL (ref 0.1–2)
BUN BLD-MCNC: 19 MG/DL (ref 9–23)
CALCIUM BLD-MCNC: 9.7 MG/DL (ref 8.5–10.1)
CHLORIDE SERPL-SCNC: 104 MMOL/L (ref 98–112)
CO2 SERPL-SCNC: 28 MMOL/L (ref 21–32)
CREAT BLD-MCNC: 0.9 MG/DL
CRP SERPL-MCNC: 3.11 MG/DL (ref ?–0.3)
EGFRCR SERPLBLD CKD-EPI 2021: 82 ML/MIN/1.73M2 (ref 60–?)
EOSINOPHIL # BLD AUTO: 0.12 X10(3) UL (ref 0–0.7)
EOSINOPHIL NFR BLD AUTO: 1.2 %
ERYTHROCYTE [DISTWIDTH] IN BLOOD BY AUTOMATED COUNT: 12 %
GLOBULIN PLAS-MCNC: 4.8 G/DL (ref 2.8–4.4)
GLUCOSE BLD-MCNC: 114 MG/DL (ref 70–99)
HCT VFR BLD AUTO: 39.9 %
HGB BLD-MCNC: 13.7 G/DL
IMM GRANULOCYTES # BLD AUTO: 0.04 X10(3) UL (ref 0–1)
IMM GRANULOCYTES NFR BLD: 0.4 %
LACTATE SERPL-SCNC: 1.2 MMOL/L (ref 0.4–2)
LYMPHOCYTES # BLD AUTO: 2.11 X10(3) UL (ref 1–4)
LYMPHOCYTES NFR BLD AUTO: 21.8 %
MCH RBC QN AUTO: 31.2 PG (ref 26–34)
MCHC RBC AUTO-ENTMCNC: 34.3 G/DL (ref 31–37)
MCV RBC AUTO: 90.9 FL
MONOCYTES # BLD AUTO: 0.92 X10(3) UL (ref 0.1–1)
MONOCYTES NFR BLD AUTO: 9.5 %
NEUTROPHILS # BLD AUTO: 6.42 X10 (3) UL (ref 1.5–7.7)
NEUTROPHILS # BLD AUTO: 6.42 X10(3) UL (ref 1.5–7.7)
NEUTROPHILS NFR BLD AUTO: 66.4 %
OSMOLALITY SERPL CALC.SUM OF ELEC: 289 MOSM/KG (ref 275–295)
PLATELET # BLD AUTO: 255 10(3)UL (ref 150–450)
POTASSIUM SERPL-SCNC: 3.7 MMOL/L (ref 3.5–5.1)
PROT SERPL-MCNC: 7.8 G/DL (ref 6.4–8.2)
RBC # BLD AUTO: 4.39 X10(6)UL
SODIUM SERPL-SCNC: 138 MMOL/L (ref 136–145)
URATE SERPL-MCNC: 5.2 MG/DL
WBC # BLD AUTO: 9.7 X10(3) UL (ref 4–11)

## 2024-04-02 PROCEDURE — 99223 1ST HOSP IP/OBS HIGH 75: CPT | Performed by: INTERNAL MEDICINE

## 2024-04-02 PROCEDURE — 93971 EXTREMITY STUDY: CPT | Performed by: EMERGENCY MEDICINE

## 2024-04-02 RX ORDER — HYDROMORPHONE HYDROCHLORIDE 1 MG/ML
0.5 INJECTION, SOLUTION INTRAMUSCULAR; INTRAVENOUS; SUBCUTANEOUS EVERY 30 MIN PRN
Status: DISCONTINUED | OUTPATIENT
Start: 2024-04-02 | End: 2024-04-05 | Stop reason: HOSPADM

## 2024-04-02 RX ORDER — MORPHINE SULFATE 2 MG/ML
2 INJECTION, SOLUTION INTRAMUSCULAR; INTRAVENOUS EVERY 2 HOUR PRN
Status: DISCONTINUED | OUTPATIENT
Start: 2024-04-02 | End: 2024-04-05

## 2024-04-02 RX ORDER — ONDANSETRON 2 MG/ML
4 INJECTION INTRAMUSCULAR; INTRAVENOUS ONCE
Status: COMPLETED | OUTPATIENT
Start: 2024-04-02 | End: 2024-04-02

## 2024-04-02 RX ORDER — ENOXAPARIN SODIUM 100 MG/ML
40 INJECTION SUBCUTANEOUS NIGHTLY
Status: DISCONTINUED | OUTPATIENT
Start: 2024-04-02 | End: 2024-04-05

## 2024-04-02 RX ORDER — MELATONIN
3 NIGHTLY PRN
Status: DISCONTINUED | OUTPATIENT
Start: 2024-04-02 | End: 2024-04-05

## 2024-04-02 RX ORDER — CEFAZOLIN SODIUM/WATER 2 G/20 ML
2 SYRINGE (ML) INTRAVENOUS ONCE
Status: COMPLETED | OUTPATIENT
Start: 2024-04-02 | End: 2024-04-02

## 2024-04-02 RX ORDER — POTASSIUM CHLORIDE 20 MEQ/1
40 TABLET, EXTENDED RELEASE ORAL ONCE
Status: COMPLETED | OUTPATIENT
Start: 2024-04-02 | End: 2024-04-02

## 2024-04-02 RX ORDER — MORPHINE SULFATE 2 MG/ML
1 INJECTION, SOLUTION INTRAMUSCULAR; INTRAVENOUS EVERY 2 HOUR PRN
Status: DISCONTINUED | OUTPATIENT
Start: 2024-04-02 | End: 2024-04-05

## 2024-04-02 RX ORDER — ACETAMINOPHEN 325 MG/1
325 TABLET ORAL EVERY 6 HOURS PRN
Status: DISCONTINUED | OUTPATIENT
Start: 2024-04-02 | End: 2024-04-02

## 2024-04-02 RX ORDER — ROSUVASTATIN CALCIUM 5 MG/1
5 TABLET, COATED ORAL NIGHTLY
Status: DISCONTINUED | OUTPATIENT
Start: 2024-04-02 | End: 2024-04-05

## 2024-04-02 RX ORDER — ACETAMINOPHEN 500 MG
500 TABLET ORAL EVERY 6 HOURS PRN
Status: DISCONTINUED | OUTPATIENT
Start: 2024-04-02 | End: 2024-04-05

## 2024-04-02 RX ORDER — MORPHINE SULFATE 2 MG/ML
0.5 INJECTION, SOLUTION INTRAMUSCULAR; INTRAVENOUS EVERY 2 HOUR PRN
Status: DISCONTINUED | OUTPATIENT
Start: 2024-04-02 | End: 2024-04-05

## 2024-04-02 RX ORDER — CEFAZOLIN SODIUM/WATER 2 G/20 ML
2 SYRINGE (ML) INTRAVENOUS EVERY 8 HOURS
Status: DISCONTINUED | OUTPATIENT
Start: 2024-04-02 | End: 2024-04-05

## 2024-04-02 RX ORDER — ACETAMINOPHEN AND CODEINE PHOSPHATE 300; 30 MG/1; MG/1
1 TABLET ORAL EVERY 4 HOURS PRN
Status: DISCONTINUED | OUTPATIENT
Start: 2024-04-02 | End: 2024-04-05

## 2024-04-02 RX ORDER — AMLODIPINE BESYLATE 5 MG/1
10 TABLET ORAL NIGHTLY
Status: DISCONTINUED | OUTPATIENT
Start: 2024-04-02 | End: 2024-04-05

## 2024-04-02 RX ORDER — LISINOPRIL 20 MG/1
20 TABLET ORAL NIGHTLY
Status: DISCONTINUED | OUTPATIENT
Start: 2024-04-02 | End: 2024-04-05

## 2024-04-02 RX ORDER — ONDANSETRON 2 MG/ML
4 INJECTION INTRAMUSCULAR; INTRAVENOUS EVERY 6 HOURS PRN
Status: DISCONTINUED | OUTPATIENT
Start: 2024-04-02 | End: 2024-04-05

## 2024-04-02 NOTE — ED INITIAL ASSESSMENT (HPI)
A&Ox3 patient bib family for c/o R shoulder to elbow pain    No recent falls/trauma    Patient has been recently seen and treated for similar pain, Dx w/ Arthritis flare    Patient also recently finished Abx for LUE septic PICC line infection    New redness/swelling/pain to R elbow at this time    RR even/NL, speaking in full clear sentences    Patient is primarily Montserratian speaking

## 2024-04-02 NOTE — TELEPHONE ENCOUNTER
Pt is currently experiencing Right Elbow and Right Hand pain. There is notable swelling and the HH aide believes there is visible fluid in the hand. Instructed her and daughter to take pt to ER to be evaluated and treated. Patient's daughter verbalized agreement and understanding.

## 2024-04-02 NOTE — ED QUICK NOTES
Orders for admission, patient is aware of plan and ready to go upstairs. Any questions, please call ED RN Yessica at extension [phone].     Patient Covid vaccination status: Fully vaccinated     COVID Test Ordered in ED: None    COVID Suspicion at Admission: N/A    Running Infusions:  None    Mental Status/LOC at time of transport: A/Ox4 Sinhala speaking, daughter in law at bedside     Other pertinent information:   CIWA score: N/A   NIH score:  N/A

## 2024-04-02 NOTE — H&P
[facility]                                                               History & Physical         Benjy Randolph Patient Status:  Emergency    1935 MRN [id number]   Location [facility] EMERGENCY DEPARTMENT Attending Lainey Sanabria MD   Hosp Day # 0 PCP Chyna Pascual DO     Chief Complaint: Right hand redness and swelling and pain, right shoulder pain with inability to lift his right arm up    History of Present Illness:  Benjy Randolph is a 89 year old male admitted with Right hand redness and swelling and pain, right shoulder pain with inability to lift his right arm up.  Symptoms started 2 days back according to patient and patient's daughter at bedside and also patient's granddaughter over phone who is translating Amharic for patient per patient's wishes.  Patient was recently admitted to the hospital at [facility] according to patient's granddaughter.  According to patient's granddaughter patient was diagnosed with left elbow bursitis which was felt to be infected and had a PICC line in his right arm and was on IV antibiotics with IV Rocephin until this Friday, at that time PICC line was discontinued on this Friday according to patient's daughter and granddaughter.  Patient's left elbow improved.  2 days back started to have worsening pain and redness and swelling and stiffness on right arm, has right shoulder pain and unable to lift the right shoulder up according to patient and patient's daughter and granddaughter.  Also had previous history of gout according to patient's daughter and granddaughter.  Patient afebrile.  Denies any chest pain or shortness of breath.  Denies any nausea vomiting.  Denies any abdominal pain.  Denies any constipation or diarrhea.    History:  Past Medical History:   Diagnosis Date    Abdominal pain of unknown etiology 10/17/2020    Alcoholism /alcohol abuse 2017    Episodic    Anesthesia  complication     difficulty awakening after a surgery many years ago- needed defibrillator shock to come out per daughter,    Aortic sclerosis 2/13/2015    Aortic valve sclerosis 9/29/2015    Arthritis     Atrophic gastritis 9-    chronic, inactive    Basal cell carcinoma (BCC) 8/4/2020    BPH (benign prostatic hyperplasia)     Cancer (HCC)     skin cancer ? kind    Cataract     HAD SURGERY- ??HAS LENS IMPLANTS    Change in vision 8/4/2020    Chronic diastolic heart failure (HCC) 10/24/2020    Colon cancer (HCC) 2010    Colon polyps 9-    COVID-19 virus infection 11/24/2020    Dermatitis 10/26/2018    New. Right forearm    EKG, abnormal 2/13/2015    Elevated hemoglobin (HCC) 4/16/2018    Esophageal reflux     no longer a problem/ resolved    Essential hypertension, benign 2/5/2015    Uncontrolled     Heart murmur     Herpes zoster without complication 10/24/2020    High cholesterol     History of cardiac murmur     History of colon cancer 8/4/2020    History of hernia repair 2/11/2015    [place] in [place]     History of skin cancer 9/27/2016    Hypertensive urgency     Malignant neoplasm of colon (HCC) 2/27/2012    Murmur, heart     benign    Osteoarthritis     right shouder- surgery scheduled 06/30/16, also left shoulder    Other and unspecified hyperlipidemia     Parkinson disease (HCC) 2014    Personal history of antineoplastic chemotherapy     2010    Pneumonia due to COVID-19 virus 12/11/2020    PONV (postoperative nausea and vomiting)     Postherpetic neuralgia 11/16/2020    Prurigo 8/4/2020    Retained suture 9/29/2015    S/P repair of ventral hernia 3/30/2018    SBO (small bowel obstruction) ([facility]) 3/30/2018    Shoulder injury 1987    left     Suture granuloma 10/3/2017    Tubular adenoma 9-    Uncomfortable fullness after meals     Ventral hernia without obstruction or gangrene 3/21/2018    Ventral incisional hernia 11/2/2017       Past Surgical History:   Procedure  Laterality Date    APPENDECTOMY      ARTHROSCOPY OF JOINT UNLISTED Bilateral     SURGERY ON BOTH SHOULDERS - UNSURE IF SCOPE OR OPEN    CATARACT Bilateral     COLECTOMY      COLONOSCOPY  9-    Dr. Priscilla [place], [place]    HERNIA SURGERY      SKIN SURGERY Left 4-14-15    MMS done for SCC, well dif, inv to left antihelix, AB    SKIN SURGERY  5/4/2015    MMS - BCC to the Left [place]     SKIN SURGERY  3/29/17    MMS- BCC-micronod to right nasofacial sulcus done by AB    UPPER GI ENDOSCOPY,EXAM         Family history:  Family History   Problem Relation Age of Onset    Heart Attack Sister     Cancer Neg     Heart Disease Neg     Stroke Neg       Reviewed    Social history:   reports that he quit smoking about 9 years ago. His smoking use included cigarettes. He has a 16.8 pack-year smoking history. He has never used smokeless tobacco. He reports current alcohol use. He reports that he does not use drugs.    Allergies:  No Known Allergies    Home Medications:  (Not in a hospital admission)      Review of Systems:  A comprehensive 14 point review of systems was completed.  Pertinent positives and negatives noted in the the HPI.    Physical Exam:     Vital signs: Blood pressure (!) 182/76, pulse 64, temperature 98.4 °F (36.9 °C), resp. rate 18, SpO2 96%.  General: No acute distress.   HEENT: Moist mucous membranes.  Respiratory: Clear to auscultation bilaterally.  No wheezes. No rhonchi.  Cardiovascular: S1, S2.  Regular rate and rhythm.    Abdomen: Soft, nontender, nondistended.  Positive bowel sounds.   Neurologic: Awake alert, no focal neurological deficits.  Musculoskeletal: Right shoulder tenderness and stiffness and has right frozen shoulder.  Right upper extremity erythema and swelling and tenderness to palpation.  Psychiatric: Appropriate mood and affect.      Diagnostic Data:      Laboratory Data:   Lab Results   Component Value Date    WBC 9.7 04/02/2024    HGB 13.7 04/02/2024    HCT 39.9 04/02/2024    .0  04/02/2024    CREATSERUM 0.90 04/02/2024    BUN 19 04/02/2024     04/02/2024    K 3.7 04/02/2024     04/02/2024    CO2 28.0 04/02/2024     04/02/2024    CA 9.7 04/02/2024    ALB 3.0 04/02/2024    ALKPHO 100 04/02/2024    BILT 0.3 04/02/2024    TP 7.8 04/02/2024    AST 19 04/02/2024    ALT 12 04/02/2024    CRP 3.11 04/02/2024       Imaging:  Imaging data reviewed in Epic.  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  # Recent left elbow bursitis, with questionable septic arthritis status post IV antibiotics and was seen by ID and Ortho while at  per care everywhere chart review  IV antibiotics  ID and Ortho consult  PT OT eval  Uric acid level normal  On care everywhere chart review, left elbow synovial fluid aspirate culture done on 3/15/2024 at  had no growth  #Hypertension  #History of prior chronic diastolic heart failure  Continue home medications including Norvasc and lisinopril  Does not take any Lasix at home as reported by patient's daughter and granddaughter  Follow blood pressure  Echo done during recent hospital stay at [facility] during recent admission in March 2024 reportedly showed TTE: LVEF 63%. Stage 1 DD. Mild-moderate AVS. No significant other valvular abnormality.  On care everywhere chart review  #Hyperlipidemia  Statin  #Parkinson's disease  Continue home carbidopa levodopa.  Patient's daughter and granddaughter reported that patient self discontinued his carbidopa levodopa when he went to visit [place]  PT OT  #History of gout  #History of colon cancer with prior colectomy and prior chemo  #Osteoarthritis    Quality:  DVT Prophylaxis: DVT Mechanical Prophylaxis:   SCDs,    DVT  Pharmacologic Prophylaxis   Medication    enoxaparin (Lovenox) 40 MG/0.4ML SUBQ injection 40 mg              CODE status:   Code Status: Full Code  Prasad: No urinary catheter in place  Central line:  No    Plan of care discussed with patient, patient's daughter at bedside.  Discussed with patient's granddaughter over phone at patient's bedside who is translating Samoan for patient per patient's wishes    Discussed with ER Physician.  Old chart reviewed in care everywhere from recent admission at [facility]    Desiree Arreola MD  4/2/2024  4:32 PM

## 2024-04-02 NOTE — PLAN OF CARE
NURSING ADMISSION NOTE      Patient admitted via  wheelchair from ED  Oriented to room.  Safety precautions initiated.  Bed in low position.  Call light in reach.

## 2024-04-02 NOTE — ED PROVIDER NOTES
Patient Seen in: [facility] Emergency Department      History     Chief Complaint   Patient presents with    Swelling Edema     Stated Complaint: right hand swelling    Subjective:   HPI    89 male presents to the emergency department with swelling and redness to his right arm.  He states it is particularly on the dorsum of his hand.  No known history of any trauma.  Patient recently had a PICC line in and had issues with a left elbow bursitis that was felt to be infected.  He was on Rocephin until Friday and then it was discontinued.  His elbow on the right is fine but he had sudden worsening on the right side.  On review of records from [facility] it does appear that he has known osteoarthritis on that right side.  I am unable to be certain which side he had his PICC line on.  No documented fevers.  No other acute complaints    Objective:   Past Medical History:   Diagnosis Date    Abdominal pain of unknown etiology 10/17/2020    Alcoholism /alcohol abuse 8/30/2017    Episodic    Anesthesia complication     difficulty awakening after a surgery many years ago- needed defibrillator shock to come out per daughter,    Aortic sclerosis 2/13/2015    Aortic valve sclerosis 9/29/2015    Arthritis     Atrophic gastritis 9-    chronic, inactive    Basal cell carcinoma (BCC) 8/4/2020    BPH (benign prostatic hyperplasia)     Cancer (HCC)     skin cancer ? kind    Cataract     HAD SURGERY- ??HAS LENS IMPLANTS    Change in vision 8/4/2020    Chronic diastolic heart failure (HCC) 10/24/2020    Colon cancer (HCC) 2010    Colon polyps 9-    COVID-19 virus infection 11/24/2020    Dermatitis 10/26/2018    New. Right forearm    EKG, abnormal 2/13/2015    Elevated hemoglobin (HCC) 4/16/2018    Esophageal reflux     no longer a problem/ resolved    Essential hypertension, benign 2/5/2015    Uncontrolled     Heart murmur     Herpes zoster without complication 10/24/2020    High cholesterol     History of cardiac murmur      History of colon cancer 2020    History of hernia repair 2015    [place]     History of skin cancer 2016    Hypertensive urgency     Malignant neoplasm of colon (HCC) 2012    Murmur, heart     benign    Osteoarthritis     right shouder- surgery scheduled 16, also left shoulder    Other and unspecified hyperlipidemia     Parkinson disease (HCC)     Personal history of antineoplastic chemotherapy         Pneumonia due to COVID-19 virus 2020    PONV (postoperative nausea and vomiting)     Postherpetic neuralgia 2020    Prurigo 2020    Retained suture 2015    S/P repair of ventral hernia 3/30/2018    SBO (small bowel obstruction) (HCC) 3/30/2018    Shoulder injury 1987    left     Suture granuloma 10/3/2017    Tubular adenoma 2014    Uncomfortable fullness after meals     Ventral hernia without obstruction or gangrene 3/21/2018    Ventral incisional hernia 2017              Past Surgical History:   Procedure Laterality Date    APPENDECTOMY      ARTHROSCOPY OF JOINT UNLISTED Bilateral     SURGERY ON BOTH SHOULDERS - UNSURE IF SCOPE OR OPEN    CATARACT Bilateral     COLECTOMY      COLONOSCOPY  2014    Dr. Priscilla [place], [place]    HERNIA SURGERY      SKIN SURGERY Left 4-14-15    MMS done for SCC, well dif, inv to left antihelix, AB    SKIN SURGERY  2015    MMS - BCC to the Left [place]     SKIN SURGERY  3/29/17    MMS- BCC-micronod to right nasofacial sulcus done by AB    UPPER GI ENDOSCOPY,EXAM                  Social History     Socioeconomic History    Marital status:    Tobacco Use    Smoking status: Former     Packs/day: 0.25     Years: 67.00     Additional pack years: 0.00     Total pack years: 16.75     Types: Cigarettes     Quit date: 3/10/2015     Years since quittin.0    Smokeless tobacco: Never   Vaping Use    Vaping Use: Never used   Substance and Sexual Activity    Alcohol use: Yes     Comment: socially     Drug use: No   Other Topics Concern     Service No    Blood Transfusions No    Caffeine Concern Yes     Comment: 2 cups of coffee daily. 1 soda weekly    Occupational Exposure No    Hobby Hazards No    Sleep Concern Yes    Stress Concern No    Weight Concern No    Special Diet No    Back Care No    Exercise Yes     Comment: Daily walking    Bike Helmet No    Seat Belt Yes    Self-Exams No     Social Determinants of Health     Food Insecurity: No Food Insecurity (4/2/2024)    Food Insecurity     Food Insecurity: Never true   Transportation Needs: No Transportation Needs (4/2/2024)    Transportation Needs     Lack of Transportation: No   Housing Stability: Low Risk  (4/2/2024)    Housing Stability     Housing Instability: No              Review of Systems   All other systems reviewed and are negative.      Positive for stated complaint: right hand swelling  Other systems are as noted in HPI.  Constitutional and vital signs reviewed.      All other systems reviewed and negative except as noted above.    Physical Exam     ED Triage Vitals   BP 04/02/24 1250 (!) 180/75   Pulse 04/02/24 1250 70   Resp 04/02/24 1250 19   Temp 04/02/24 1250 98.4 °F (36.9 °C)   Temp src --    SpO2 04/02/24 1250 95 %   O2 Device 04/02/24 1535 None (Room air)       Current:BP (!) 182/76   Pulse 64   Temp 98.4 °F (36.9 °C)   Resp 18   Wt 91.2 kg   SpO2 96%   BMI 32.44 kg/m²         Physical Exam  Vitals and nursing note reviewed. Chaperone present: Family in room assisting with history.   Constitutional:       General: He is not in acute distress.     Appearance: Normal appearance. He is well-developed.   HENT:      Head: Normocephalic and atraumatic.   Cardiovascular:      Rate and Rhythm: Normal rate and regular rhythm.      Pulses: Normal pulses.      Heart sounds: Normal heart sounds.   Pulmonary:      Effort: Pulmonary effort is normal.      Breath sounds: Normal breath sounds. No stridor.   Abdominal:      General: Bowel  sounds are normal.      Palpations: Abdomen is soft.   Musculoskeletal:         General: Swelling and tenderness present.      Cervical back: Normal range of motion and neck supple.      Comments: Significant swelling erythema and tenderness to right arm.  I do not appreciate any distinct area of induration or fluctuance.  No distinct joint tenderness.  Decreased range of motion overall of his right upper extremity which is chronic   Lymphadenopathy:      Cervical: No cervical adenopathy.   Skin:     General: Skin is warm and dry.      Findings: Erythema present.   Neurological:      General: No focal deficit present.      Mental Status: He is alert and oriented to person, place, and time.              ED Course     Labs Reviewed   COMP METABOLIC PANEL (14) - Abnormal; Notable for the following components:       Result Value    Glucose 114 (*)     ALT 12 (*)     Albumin 3.0 (*)     Globulin  4.8 (*)     A/G Ratio 0.6 (*)     All other components within normal limits   C-REACTIVE PROTEIN - Abnormal; Notable for the following components:    C-Reactive Protein 3.11 (*)     All other components within normal limits   LACTIC ACID, PLASMA - Normal   URIC ACID - Normal   CBC WITH DIFFERENTIAL WITH PLATELET    Narrative:     The following orders were created for panel order CBC With Differential With Platelet.  Procedure                               Abnormality         Status                     ---------                               -----------         ------                     CBC W/ DIFFERENTIAL[[id number]]                              Final result                 Please view results for these tests on the individual orders.   BLOOD CULTURE   BLOOD CULTURE   CBC W/ DIFFERENTIAL             US VENOUS DOPPLER ARM RIGHT - DIAG IMG (CPT=93971)    Result Date: 4/2/2024  PROCEDURE:  US VENOUS DOPPLER ARM RIGHT - DIAG IMG (CPT=93971)  COMPARISON:  None.  INDICATIONS:  eval for DVT  TECHNIQUE:  Real time, grey scale, and  duplex ultrasound was used to evaluate the upper extremity venous system. B-mode two-dimensional images of the vascular structures, Doppler spectral analysis, and color flow.  Doppler imaging were performed.  The following veins were imaged:  Subclavian, Jugular, Axillary, Brachial, Basilic, Cephalic, and the contralateral Subclavian and Jugular.  PATIENT STATED HISTORY: (As transcribed by Technologist)     FINDINGS:  EXTREMITY:  Right upper extremity THROMBI:  None visible. COMPRESSION:  Normal compressibility, phasicity, and augmentation of the subclavian, jugular, axillary, brachial, basilic, and cephalic veins. OTHER:  Negative.            CONCLUSION:  No evidence of DVT in the right upper extremity.   LOCATION:  [place] [address]   Dictated by (CST): Santos Mejia MD on 4/02/2024 at 2:38 PM     Finalized by (CST): Santos Mejia MD on 4/02/2024 at 2:38 PM              MDM      IV established and blood work obtained.  I reviewed records as best I could from [facility].  It does appear that he was not having any erythema and swelling of the right arm that this is an acute issue.  I do feel that this is potentially cellulitic and we also did the a ultrasound to be certain there is no evidence of blood clot.  This was negative he had a uric acid but does not seem consistent with gout.  He does have an elevated C-reactive protein.  His overall white blood cell count is good he does not exhibit any signs of sepsis at this time.  I am concerned that this occurred shortly after being discontinued from antibiotics.  Subsequently he will be placed on Ancef blood cultures were obtained.  I did contact the [facility] and he will be admitted for further evaluation and treatment.  Reviewed the results and the plan with the patient's daughter.  Admission disposition: 4/2/2024  3:48 PM                                        Medical Decision Making      Disposition and Plan     Clinical Impression:  1. Cellulitis of right upper  extremity         Disposition:  Admit  4/2/2024  3:48 pm    Follow-up:  No follow-up provider specified.        Medications Prescribed:  Current Discharge Medication List                            Hospital Problems       Present on Admission  Date Reviewed: 4/2/2024            ICD-10-CM Noted POA    * (Principal) Cellulitis of right upper extremity L03.113 4/2/2024 Unknown

## 2024-04-03 ENCOUNTER — APPOINTMENT (OUTPATIENT)
Dept: GENERAL RADIOLOGY | Facility: HOSPITAL | Age: 89
End: 2024-04-03
Attending: ORTHOPAEDIC SURGERY
Payer: MEDICARE

## 2024-04-03 LAB
ANION GAP SERPL CALC-SCNC: 3 MMOL/L (ref 0–18)
BASOPHILS # BLD AUTO: 0.07 X10(3) UL (ref 0–0.2)
BASOPHILS NFR BLD AUTO: 0.9 %
BUN BLD-MCNC: 18 MG/DL (ref 9–23)
CALCIUM BLD-MCNC: 9.2 MG/DL (ref 8.5–10.1)
CHLORIDE SERPL-SCNC: 105 MMOL/L (ref 98–112)
CO2 SERPL-SCNC: 29 MMOL/L (ref 21–32)
CREAT BLD-MCNC: 0.95 MG/DL
EGFRCR SERPLBLD CKD-EPI 2021: 77 ML/MIN/1.73M2 (ref 60–?)
EOSINOPHIL # BLD AUTO: 0.1 X10(3) UL (ref 0–0.7)
EOSINOPHIL NFR BLD AUTO: 1.3 %
ERYTHROCYTE [DISTWIDTH] IN BLOOD BY AUTOMATED COUNT: 11.9 %
GLUCOSE BLD-MCNC: 136 MG/DL (ref 70–99)
HCT VFR BLD AUTO: 39.4 %
HGB BLD-MCNC: 13.2 G/DL
IMM GRANULOCYTES # BLD AUTO: 0.02 X10(3) UL (ref 0–1)
IMM GRANULOCYTES NFR BLD: 0.3 %
LYMPHOCYTES # BLD AUTO: 1.87 X10(3) UL (ref 1–4)
LYMPHOCYTES NFR BLD AUTO: 25.1 %
MCH RBC QN AUTO: 30.9 PG (ref 26–34)
MCHC RBC AUTO-ENTMCNC: 33.5 G/DL (ref 31–37)
MCV RBC AUTO: 92.3 FL
MONOCYTES # BLD AUTO: 0.68 X10(3) UL (ref 0.1–1)
MONOCYTES NFR BLD AUTO: 9.1 %
NEUTROPHILS # BLD AUTO: 4.72 X10 (3) UL (ref 1.5–7.7)
NEUTROPHILS # BLD AUTO: 4.72 X10(3) UL (ref 1.5–7.7)
NEUTROPHILS NFR BLD AUTO: 63.3 %
OSMOLALITY SERPL CALC.SUM OF ELEC: 288 MOSM/KG (ref 275–295)
PLATELET # BLD AUTO: 226 10(3)UL (ref 150–450)
POTASSIUM SERPL-SCNC: 4.4 MMOL/L (ref 3.5–5.1)
POTASSIUM SERPL-SCNC: 4.4 MMOL/L (ref 3.5–5.1)
RBC # BLD AUTO: 4.27 X10(6)UL
SODIUM SERPL-SCNC: 137 MMOL/L (ref 136–145)
WBC # BLD AUTO: 7.5 X10(3) UL (ref 4–11)

## 2024-04-03 PROCEDURE — 73070 X-RAY EXAM OF ELBOW: CPT | Performed by: ORTHOPAEDIC SURGERY

## 2024-04-03 PROCEDURE — 99222 1ST HOSP IP/OBS MODERATE 55: CPT | Performed by: ORTHOPAEDIC SURGERY

## 2024-04-03 PROCEDURE — 73100 X-RAY EXAM OF WRIST: CPT | Performed by: ORTHOPAEDIC SURGERY

## 2024-04-03 PROCEDURE — 99232 SBSQ HOSP IP/OBS MODERATE 35: CPT | Performed by: INTERNAL MEDICINE

## 2024-04-03 PROCEDURE — 73030 X-RAY EXAM OF SHOULDER: CPT | Performed by: ORTHOPAEDIC SURGERY

## 2024-04-03 RX ORDER — KETOROLAC TROMETHAMINE 30 MG/ML
15 INJECTION, SOLUTION INTRAMUSCULAR; INTRAVENOUS EVERY 6 HOURS
Status: DISCONTINUED | OUTPATIENT
Start: 2024-04-03 | End: 2024-04-04

## 2024-04-03 NOTE — CONSULTS
[facility]edic Consult Note    CC: Right shoulder, arm pain and swelling    HPI: The patient is a 89 year old  male with multiple medical problems including history of gout admitted yesterday with complaints of increasing pain and swelling about the right upper extremity.  He was reportedly discharged from [facility] in [place] for septic bursitis of the left elbow treated with IV antibiotics via PICC line, which was recently discontinued.  Over the past 2 days he is experienced progressive swelling, pain and redness about the right hand, wrist and elbow with worsening pain about the right shoulder.  According to the patient and his daughter he has been previously diagnosed with an abnormal right shoulder due to arthritis.  No constitutional symptoms preceded his current flareup.     Past Medical History:   Diagnosis Date    Abdominal pain of unknown etiology 10/17/2020    Alcoholism /alcohol abuse 8/30/2017    Episodic    Anesthesia complication     difficulty awakening after a surgery many years ago- needed defibrillator shock to come out per daughter,    Aortic sclerosis 2/13/2015    Aortic valve sclerosis 9/29/2015    Arthritis     Atrophic gastritis 9-    chronic, inactive    Basal cell carcinoma (BCC) 8/4/2020    BPH (benign prostatic hyperplasia)     Cancer (HCC)     skin cancer ? kind    Cataract     HAD SURGERY- ??HAS LENS IMPLANTS    Change in vision 8/4/2020    Chronic diastolic heart failure (HCC) 10/24/2020    Colon cancer ([facility]) 2010    Colon polyps 9-    COVID-19 virus infection 11/24/2020    Dermatitis 10/26/2018    New. Right forearm    EKG, abnormal 2/13/2015    Elevated hemoglobin (HCC) 4/16/2018    Esophageal reflux     no longer a problem/ resolved    Essential hypertension, benign 2/5/2015    Uncontrolled     Heart murmur     Herpes zoster without complication 10/24/2020    High cholesterol     History of cardiac murmur     History of colon cancer 8/4/2020     History of hernia repair 2015    [place]     History of skin cancer 2016    Hypertensive urgency     Malignant neoplasm of colon (HCC) 2012    Murmur, heart     benign    Osteoarthritis     right shouder- surgery scheduled 16, also left shoulder    Other and unspecified hyperlipidemia     Parkinson disease (HCC)     Personal history of antineoplastic chemotherapy         Pneumonia due to COVID-19 virus 2020    PONV (postoperative nausea and vomiting)     Postherpetic neuralgia 2020    Prurigo 2020    Retained suture 2015    S/P repair of ventral hernia 3/30/2018    SBO (small bowel obstruction) (HCC) 3/30/2018    Shoulder injury 1987    left     Suture granuloma 10/3/2017    Tubular adenoma 2014    Uncomfortable fullness after meals     Ventral hernia without obstruction or gangrene 3/21/2018    Ventral incisional hernia 2017     Past Surgical History:   Procedure Laterality Date    APPENDECTOMY      ARTHROSCOPY OF JOINT UNLISTED Bilateral     SURGERY ON BOTH SHOULDERS - UNSURE IF SCOPE OR OPEN    CATARACT Bilateral     COLECTOMY      COLONOSCOPY  2014    Dr. Priscilla [place], [place]    HERNIA SURGERY      SKIN SURGERY Left 4-14-15    MMS done for SCC, well dif, inv to left antihelix, AB    SKIN SURGERY  2015    MMS - BCC to the Left [place]     SKIN SURGERY  3/29/17    MMS- BCC-micronod to right nasofacial sulcus done by AB    UPPER GI ENDOSCOPY,EXAM       No current outpatient medications on file.     No Known Allergies  Family History   Problem Relation Age of Onset    Heart Attack Sister     Cancer Neg     Heart Disease Neg     Stroke Neg      Social History     Occupational History    Not on file   Tobacco Use    Smoking status: Former     Packs/day: 0.25     Years: 67.00     Additional pack years: 0.00     Total pack years: 16.75     Types: Cigarettes     Quit date: 3/10/2015     Years since quittin.0    Smokeless  tobacco: Never   Vaping Use    Vaping Use: Never used   Substance and Sexual Activity    Alcohol use: Yes     Comment: socially    Drug use: No    Sexual activity: Not on file      ROS:  Complete system review obtained and noted above.    Physical Exam:    /61 (BP Location: Right arm)   Pulse 62   Temp 98.5 °F (36.9 °C) (Oral)   Resp 18   Wt 201 lb (91.2 kg)   SpO2 94%   BMI 32.44 kg/m²   Constitutional: Well-developed well-nourished 89-year-old male in his hospital bed with his daughter at the bedside  Psychological: Mildly somnolent but alert and oriented x 3  Respiratory: Breathing comfortably on room air  Cardiac: Normal sinus on the telemetry  Right upper extremity: Diffuse soft tissue swelling beginning just above the elbow down into the fingers with edema, diffuse palpable tenderness more so at the elbow and wrist than the digits.  Also tender at the glenohumeral joint line.  No obvious palpable effusions although overlying soft swelling obscures the hand wrist and elbow exam.  Minimal swelling and no erythema about the shoulder.  He has palpable crepitus on active and passive elevation and rotation of the shoulder consistent with his history of arthritis with associated pain.  He can actively flex his shoulder 50, abduct to 20 and externally rotate 20 degrees with complaints of pain.  Elbow range of motion is also somewhat limited with active arc of 45 to 105 degrees.  Active supination is to 45 and pronation to 50.  Digits and wrist are also limited in their range of motion due to soft tissue swelling and pain.  Distal neurovascular status is intact to sensory, motor and perfusion assessment.    Imaging: Multiple views right shoulder personally viewed, independently interpreted and radiology report read.  End-stage osteoarthritic changes are noted with no acute bony abnormalities or obvious effusion.    XR SHOULDER, COMPLETE (MIN 2 VIEWS), RIGHT (CPT=73030)    Result Date: 4/3/2024  CONCLUSION:   Marked osteoarthritic changes of the right shoulder.  An acute fracture is not identified.   LOCATION:  [place]   Dictated by (CST): Santos Mejia MD on 4/03/2024 at 11:06 AM     Finalized by (CST): Santos Mejia MD on 4/03/2024 at 11:07 AM       US VENOUS DOPPLER ARM RIGHT - DIAG IMG (CPT=93971)    Result Date: 4/2/2024  CONCLUSION:  No evidence of DVT in the right upper extremity.   LOCATION:  [place] [address]   Dictated by (CST): Santos Mejia MD on 4/02/2024 at 2:38 PM     Finalized by (CST): Santos Mejia MD on 4/02/2024 at 2:38 PM          Labs:     Recent Results (from the past 72 hour(s))   Comp Metabolic Panel (14)    Collection Time: 04/02/24  1:45 PM   Result Value Ref Range    Glucose 114 (H) 70 - 99 mg/dL    Sodium 138 136 - 145 mmol/L    Potassium 3.7 3.5 - 5.1 mmol/L    Chloride 104 98 - 112 mmol/L    CO2 28.0 21.0 - 32.0 mmol/L    Anion Gap 6 0 - 18 mmol/L    BUN 19 9 - 23 mg/dL    Creatinine 0.90 0.70 - 1.30 mg/dL    Calcium, Total 9.7 8.5 - 10.1 mg/dL    Calculated Osmolality 289 275 - 295 mOsm/kg    eGFR-Cr 82 >=60 mL/min/1.73m2    AST 19 15 - 37 U/L    ALT 12 (L) 16 - 61 U/L    Alkaline Phosphatase 100 45 - 117 U/L    Bilirubin, Total 0.3 0.1 - 2.0 mg/dL    Total Protein 7.8 6.4 - 8.2 g/dL    Albumin 3.0 (L) 3.4 - 5.0 g/dL    Globulin  4.8 (H) 2.8 - 4.4 g/dL    A/G Ratio 0.6 (L) 1.0 - 2.0   C-Reactive Protein    Collection Time: 04/02/24  1:45 PM   Result Value Ref Range    C-Reactive Protein 3.11 (H) <0.30 mg/dL   Lactic Acid, Plasma    Collection Time: 04/02/24  1:45 PM   Result Value Ref Range    Lactic Acid 1.2 0.4 - 2.0 mmol/L   Uric Acid    Collection Time: 04/02/24  1:45 PM   Result Value Ref Range    Uric Acid 5.2 3.5 - 7.2 mg/dL   CBC W/ DIFFERENTIAL    Collection Time: 04/02/24  1:45 PM   Result Value Ref Range    WBC 9.7 4.0 - 11.0 x10(3) uL    RBC 4.39 3.80 - 5.80 x10(6)uL    HGB 13.7 13.0 - 17.5 g/dL    HCT 39.9 39.0 - 53.0 %    .0 150.0 - 450.0 10(3)uL    MCV 90.9  80.0 - 100.0 fL    MCH 31.2 26.0 - 34.0 pg    MCHC 34.3 31.0 - 37.0 g/dL    RDW 12.0 %    Neutrophil Absolute Prelim 6.42 1.50 - 7.70 x10 (3) uL    Neutrophil Absolute 6.42 1.50 - 7.70 x10(3) uL    Lymphocyte Absolute 2.11 1.00 - 4.00 x10(3) uL    Monocyte Absolute 0.92 0.10 - 1.00 x10(3) uL    Eosinophil Absolute 0.12 0.00 - 0.70 x10(3) uL    Basophil Absolute 0.07 0.00 - 0.20 x10(3) uL    Immature Granulocyte Absolute 0.04 0.00 - 1.00 x10(3) uL    Neutrophil % 66.4 %    Lymphocyte % 21.8 %    Monocyte % 9.5 %    Eosinophil % 1.2 %    Basophil % 0.7 %    Immature Granulocyte % 0.4 %   Basic Metabolic Panel (8)    Collection Time: 04/03/24  4:26 AM   Result Value Ref Range    Glucose 136 (H) 70 - 99 mg/dL    Sodium 137 136 - 145 mmol/L    Potassium 4.4 3.5 - 5.1 mmol/L    Chloride 105 98 - 112 mmol/L    CO2 29.0 21.0 - 32.0 mmol/L    Anion Gap 3 0 - 18 mmol/L    BUN 18 9 - 23 mg/dL    Creatinine 0.95 0.70 - 1.30 mg/dL    Calcium, Total 9.2 8.5 - 10.1 mg/dL    Calculated Osmolality 288 275 - 295 mOsm/kg    eGFR-Cr 77 >=60 mL/min/1.73m2   Potassium    Collection Time: 04/03/24  4:26 AM   Result Value Ref Range    Potassium 4.4 3.5 - 5.1 mmol/L   CBC W/ DIFFERENTIAL    Collection Time: 04/03/24  4:26 AM   Result Value Ref Range    WBC 7.5 4.0 - 11.0 x10(3) uL    RBC 4.27 3.80 - 5.80 x10(6)uL    HGB 13.2 13.0 - 17.5 g/dL    HCT 39.4 39.0 - 53.0 %    .0 150.0 - 450.0 10(3)uL    MCV 92.3 80.0 - 100.0 fL    MCH 30.9 26.0 - 34.0 pg    MCHC 33.5 31.0 - 37.0 g/dL    RDW 11.9 %    Neutrophil Absolute Prelim 4.72 1.50 - 7.70 x10 (3) uL    Neutrophil Absolute 4.72 1.50 - 7.70 x10(3) uL    Lymphocyte Absolute 1.87 1.00 - 4.00 x10(3) uL    Monocyte Absolute 0.68 0.10 - 1.00 x10(3) uL    Eosinophil Absolute 0.10 0.00 - 0.70 x10(3) uL    Basophil Absolute 0.07 0.00 - 0.20 x10(3) uL    Immature Granulocyte Absolute 0.02 0.00 - 1.00 x10(3) uL    Neutrophil % 63.3 %    Lymphocyte % 25.1 %    Monocyte % 9.1 %    Eosinophil % 1.3  %    Basophil % 0.9 %    Immature Granulocyte % 0.3 %         Assessment/Plan: Right shoulder osteoarthritis and pain, right upper extremity cellulitis, possible gout flare.    I reviewed imaging and exam findings with the patient and his daughter at the bedside who provided Maltese translation.  His complaint of significant right shoulder pain is consistent with x-ray findings of fairly severe end-stage osteoarthritis although concomitant gout flare cannot be ruled out.  I have a low suspicion for a septic right shoulder given adequate active and passive motion, although this is also limited due to his degenerative joint disease.  Most of his swelling and erythema begins just above the elbow down to the hand.  Recommend obtaining plain x-rays of the wrist and elbow with close monitoring of his response to medical management.  He was begun on empirical Ancef yesterday evening, ID following.  Monitor clinical response to medical management.  Will follow closely with you.    Tata Zhang MD  [facility]

## 2024-04-03 NOTE — OCCUPATIONAL THERAPY NOTE
Attempted to see pt for skilled OT services this date. Pt is currently awaiting an ortho consult. Will re-attempt as schedule allows. Faye Ramsay, 04/03/24, 10:14 AM

## 2024-04-03 NOTE — CM/SW NOTE
04/03/24 1600   CM/SW Referral Data   Referral Source Social Work (self-referral)   Reason for Referral Discharge planning   Informant Daughter;EMR;Clinical Staff Member   Patient Info   Patient's Current Mental Status at Time of Assessment Alert   Patient's Home Environment House   Number of Levels in Home 2   Patient lives with Son;Daughter   Patient Status Prior to Admission   Services in place prior to admission Home Health Care   Home Health Provider Info [facility]   Discharge Needs   Anticipated D/C needs Home health care;To be determined       Patient is an 88 y/o man admitted with R UE cellulitis.  Spoke with Shaina from [facility] who stated pt is current with them for [facility] services.  Referral sent to provider via [facility].  Chart reviewed and noted that pt may have had home IV abx recently through [employer].  Spoke with Koki from [facility] who will attempt to confirm.    Met with pt's dtr and dtr-in-law Magui at bedside.  Pt had been living with his son up until this past weekend when he moved to another son and DIL Magui's home in [place].  They have a 2 level home with bedrooms upstairs.  Magui stated pt does not use DME at home.  Family is available to provide 24 hour assistance.  Magui asked that her dtr, Lazara ([phone]) is contacted with any DC needs as pt's family primarily Divehi speaking.      Will follow for MD and therapy recommendations for further DC planning.  / to remain available for support and/or discharge planning.     Opal Hoffman, [facility]  Discharge Planner  [phone]

## 2024-04-03 NOTE — PLAN OF CARE
Patient A & O x4, Occitan speaking, family at bedside to aid with translation. C/o severe pain, see MAR for pain medications given. R arm elevated on pillows, sling applied for comfort. IV ancef. Voiding freely. Up standby with gait belt. Safety measures in place. Instructed to use call light.

## 2024-04-03 NOTE — PHYSICAL THERAPY NOTE
Order received for PT eval and chart reviewed. Attempted to see Pt this am, however, ortho consult pending. PT will continue to follow.

## 2024-04-03 NOTE — CONSULTS
[facility]   part of [facility] ID CONSULT NOTE    Benjy Randolph Patient Status:  Inpatient    1935 MRN [id number]   Location [facility] 3SW-A Attending Desiree Arreola MD   Hosp Day # 1 PCP Chyna Pascual DO       Reason for Consultation:  R arm cellulitis, recent septic arthritis, completed abx on Friday and PICC line removed     History of Present Illness:  Benjy Randolph is a 89 year old male with a history of history of Parkinson's Disease, aortic stenosis, HTN, HLD and h/o colon cancer s/p resection. He was recently hospitalized at an outside hospital for L elbow pain and swelling due to L septic arthritis. He completed course of IV abx 3/28.     Patient now presents with R arm swelling and erythema with worsening ROM that started about 2 days ago. Denies any fevers or chills. Denies any trauma. Reports a history of gout.    Afebrile. WBC.    History:  Past Medical History:   Diagnosis Date    Abdominal pain of unknown etiology 10/17/2020    Alcoholism /alcohol abuse 2017    Episodic    Anesthesia complication     difficulty awakening after a surgery many years ago- needed defibrillator shock to come out per daughter,    Aortic sclerosis 2015    Aortic valve sclerosis 2015    Arthritis     Atrophic gastritis 2014    chronic, inactive    Basal cell carcinoma (BCC) 2020    BPH (benign prostatic hyperplasia)     Cancer (HCC)     skin cancer ? kind    Cataract     HAD SURGERY- ??HAS LENS IMPLANTS    Change in vision 2020    Chronic diastolic heart failure (HCC) 10/24/2020    Colon cancer (HCC) 2010    Colon polyps 2014    COVID-19 virus infection 2020    Dermatitis 10/26/2018    New. Right forearm    EKG, abnormal 2015    Elevated hemoglobin (HCC) 2018    Esophageal reflux     no longer a problem/ resolved    Essential hypertension, benign 2015    Uncontrolled     Heart murmur     Herpes zoster without  complication 10/24/2020    High cholesterol     History of cardiac murmur     History of colon cancer 8/4/2020    History of hernia repair 2/11/2015    First in [place], then in [place]     History of skin cancer 9/27/2016    Hypertensive urgency     Malignant neoplasm of colon (HCC) 2/27/2012    Murmur, heart     benign    Osteoarthritis     right shouder- surgery scheduled 06/30/16, also left shoulder    Other and unspecified hyperlipidemia     Parkinson disease (HCC) 2014    Personal history of antineoplastic chemotherapy     2010    Pneumonia due to COVID-19 virus 12/11/2020    PONV (postoperative nausea and vomiting)     Postherpetic neuralgia 11/16/2020    Prurigo 8/4/2020    Retained suture 9/29/2015    S/P repair of ventral hernia 3/30/2018    SBO (small bowel obstruction) (HCC) 3/30/2018    Shoulder injury 1987    left     Suture granuloma 10/3/2017    Tubular adenoma 9-    Uncomfortable fullness after meals     Ventral hernia without obstruction or gangrene 3/21/2018    Ventral incisional hernia 11/2/2017     Past Surgical History:   Procedure Laterality Date    APPENDECTOMY      ARTHROSCOPY OF JOINT UNLISTED Bilateral     SURGERY ON BOTH SHOULDERS - UNSURE IF SCOPE OR OPEN    CATARACT Bilateral     COLECTOMY      COLONOSCOPY  9-    Dr. Priscilla [place], [place]    HERNIA SURGERY      SKIN SURGERY Left 4-14-15    MMS done for SCC, well dif, inv to left antihelix, AB    SKIN SURGERY  5/4/2015    MMS - BCC to the Left Worship     SKIN SURGERY  3/29/17    MMS- BCC-micronod to right nasofacial sulcus done by AB    UPPER GI ENDOSCOPY,EXAM       Family History   Problem Relation Age of Onset    Heart Attack Sister     Cancer Neg     Heart Disease Neg     Stroke Neg       reports that he quit smoking about 9 years ago. His smoking use included cigarettes. He has a 16.8 pack-year smoking history. He has never used smokeless tobacco. He reports current alcohol use. He reports that he does not use  drugs.    Allergies:  No Known Allergies    Medications:    Current Facility-Administered Medications:     HYDROmorphone (Dilaudid) 1 MG/ML injection 0.5 mg, 0.5 mg, Intravenous, Q30 Min PRN    amLODIPine (Norvasc) tab 10 mg, 10 mg, Oral, Nightly    lisinopril (Prinivil; Zestril) tab 20 mg, 20 mg, Oral, Nightly    rosuvastatin (Crestor) tab 5 mg, 5 mg, Oral, Nightly    carbidopa-levodopa (SINEMET)  MG per tab 1 tablet, 1 tablet, Oral, Nightly    enoxaparin (Lovenox) 40 MG/0.4ML SUBQ injection 40 mg, 40 mg, Subcutaneous, Nightly    melatonin tab 3 mg, 3 mg, Oral, Nightly PRN    ondansetron (Zofran) 4 MG/2ML injection 4 mg, 4 mg, Intravenous, Q6H PRN    ceFAZolin (Ancef) 2 g in 20mL IV syringe premix, 2 g, Intravenous, Q8H    morphINE PF 2 MG/ML injection 0.5 mg, 0.5 mg, Intravenous, Q2H PRN **OR** morphINE PF 2 MG/ML injection 1 mg, 1 mg, Intravenous, Q2H PRN **OR** morphINE PF 2 MG/ML injection 2 mg, 2 mg, Intravenous, Q2H PRN    acetaminophen-codeine (Tylenol #3) 300-30 MG per tab 1 tablet, 1 tablet, Oral, Q4H PRN    acetaminophen (Tylenol Extra Strength) tab 500 mg, 500 mg, Oral, Q6H PRN    Review of Systems:  CONSTITUTIONAL:  No fatigue.  HEENT:  Eyes:  No visual loss Ears, Nose, Throat:  No hearing loss  SKIN:  No rash or itching.  CARDIOVASCULAR:  No chest pain  RESPIRATORY:  No shortness of breath, cough or sputum.  GASTROINTESTINAL:  No nausea, vomiting or diarrhea. No abdominal pain  GENITOURINARY:  No Burning on urination.   NEUROLOGICAL:  No headache  MUSCULOSKELETAL: RUE pain, R shoulder pain  HEMATOLOGIC:  No bleeding  ALLERGIES:  No history of asthma    Physical Exam:  Vital signs: Blood pressure 147/63, pulse 55, temperature 98.3 °F (36.8 °C), temperature source Oral, resp. rate 18, weight 201 lb (91.2 kg), SpO2 95%.    General: Alert, oriented, NAD, on room air.  HEENT: Moist mucous membranes.   Neck: No lymphadenopathy.  Supple.  Cardiovascular: RRR  Respiratory: Clear to auscultation  bilaterally.  No wheezes. No rhonchi.  Abdomen: Soft, nontender, nondistended.   Musculoskeletal: RUE edema and erythema with decreased ROM. Able to move L arm without difficulty.  Integument: No lesions. No erythema.    Laboratory Data:  Recent Labs   Lab 04/03/24  0426   RBC 4.27   HGB 13.2   HCT 39.4   MCV 92.3   MCH 30.9   MCHC 33.5   RDW 11.9   NEPRELIM 4.72   WBC 7.5   .0     Recent Labs   Lab 04/02/24  1345 04/03/24  0426   * 136*   BUN 19 18   CREATSERUM 0.90 0.95   CA 9.7 9.2   ALB 3.0*  --     137   K 3.7 4.4  4.4    105   CO2 28.0 29.0   ALKPHO 100  --    AST 19  --    ALT 12*  --    BILT 0.3  --    TP 7.8  --        Microbiology: Reviewed in EMR    Radiology: Reviewed.    PROCEDURE:  XR SHOULDER, COMPLETE (MIN 2 VIEWS), RIGHT (CPT=73030)     TECHNIQUE:  Multiple views were obtained.     COMPARISON:  None.     INDICATIONS:  R shoulder pain     PATIENT STATED HISTORY: (As transcribed by Technologist)  The patient offered no additional history at this point.     FINDINGS:       Marked loss of right glenohumeral joint space with subchondral sclerosis along with large right humeral head osteophyte.  Inferior glenoid osteophytes noted.     Subacromial osteophytes noted.     Moderate right acromioclavicular joint hypertrophic changes is noted.     Impression:    CONCLUSION:  Marked osteoarthritic changes of the right shoulder.  An acute fracture is not identified.     LOCATION:  [facility]     Dictated by (CST): Santos Mejia MD on 4/03/2024 at 11:06 AM      Finalized by (CST): Santos Mejia MD on 4/03/2024 at 11:07 AM         PROCEDURE:  US VENOUS DOPPLER ARM RIGHT - DIAG IMG (CPT=93971)     COMPARISON:  None.     INDICATIONS:  eval for DVT     TECHNIQUE:  Real time, grey scale, and duplex ultrasound was used to evaluate the upper extremity venous system. B-mode two-dimensional images of the vascular structures, Doppler spectral analysis, and color flow.  Doppler imaging were  performed.  The  following veins were imaged:  Subclavian, Jugular, Axillary, Brachial, Basilic, Cephalic, and the contralateral Subclavian and Jugular.     PATIENT STATED HISTORY: (As transcribed by Technologist)        FINDINGS:    EXTREMITY:  Right upper extremity  THROMBI:  None visible.  COMPRESSION:  Normal compressibility, phasicity, and augmentation of the subclavian, jugular, axillary, brachial, basilic, and cephalic veins.  OTHER:  Negative.    Impression:    CONCLUSION:  No evidence of DVT in the right upper extremity.     LOCATION:  [place] [id number]     Dictated by (CST): Santos Mejia MD on 4/02/2024 at 2:38 PM      Finalized by (CST): Santos Mejia MD on 4/02/2024 at 2:38 PM      ASSESSMENT:  RUE erythema and edema, ? Cellulitis vs gout (h/o gout).   Venous US negative for DVT; recent PICC to RUE.   Left elbow septic arthritis  Synovial fluid 3/15 with WBC 78,760, 93 neutr. Cx negative.  Completed course of IV vanco/CTX 3/28  Right shoulder OA  History of Parkinson's Disease  Aortic stenosis  HTN, HLD  H/o colon cancer s/p resection    PLAN:  - continue IV Ancef for now  - follow blood cultures  - follow temps  - follow R arm clinically    Discussed case with RN, Dr. Stringer and family at the bedside.    Thank you for allowing us to participate in the care of this patient. Please do not hesitate to call if you have any questions.   We will continue to follow with you and will make further recommendations based on his progress.    SUDHAKAR Temple   [facility]  [phone]  4/3/2024    ID ATTENDING ADDENDUM     Pt seen an examined independently. Chart reviewed. Agree with above. Note has been reviewed by me and modified as needed.  Exam and Impression/ Recs as noted above.  Recently treated for septic arthritis L elbow, now here with R arm pain, erythema and swelling. On exam, has significant pain at wrist, elbow and shoulder. Also with some hand pain and swelling. 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 Daughter tells me that has had gout before. Check UA level, add toradol. Elevate arm as able.  Will follow  D/w staff and with pt and family at the bedside  More than 50% of clinical time and 100% of the clinical decision making performed by me.    Lyudmila Stringer MD

## 2024-04-03 NOTE — PROGRESS NOTES
[facility]   part of [facility]     Hospitalist Progress Note     Benjy Randolph Patient Status:  Inpatient    1935 MRN [id number]   Location [facility] 3SW-A Attending Desiree Arreola MD   Hosp Day # 1 PCP Chyna Pascual DO     Chief Complaint: Right hand redness and swelling and pain, right shoulder pain with inability to lift his right arm up.    Subjective:     Patient sitting up in the chair.  Patient seen with patient's daughter at bedside who is helping to translate [place] Islander for patient.  Denies any chest pain or shortness of breath.  Right arm pain improving slightly from yesterday.  Still has right shoulder pain.  Patient remains afebrile.  Denies abdominal pain.  No nausea vomiting.  Tolerating diet.      Objective:    Review of Systems:   A comprehensive review of systems was completed; pertinent positive and negatives stated in subjective.    Vital signs:  Temp:  [97.7 °F (36.5 °C)-98.7 °F (37.1 °C)] 98.4 °F (36.9 °C)  Pulse:  [55-68] 59  Resp:  [16-22] 16  BP: (140-182)/(50-79) 146/50  SpO2:  [91 %-96 %] 91 %    Physical Exam:    /50 (BP Location: Left arm)   Pulse 59   Temp 98.4 °F (36.9 °C) (Oral)   Resp 16   Wt 201 lb (91.2 kg)   SpO2 91%   BMI 32.44 kg/m²     General: No acute distress.   HEENT: Moist mucous membranes.  Respiratory: Clear to auscultation bilaterally.  No wheezes. No rhonchi.  Cardiovascular: S1, S2.  Regular rate and rhythm.    Abdomen: Soft, nontender, nondistended.  Positive bowel sounds.   Neurologic: Awake alert, no focal neurological deficits.  Musculoskeletal: Right shoulder tenderness and stiffness and has right frozen shoulder.  Right upper extremity erythema and swelling and tenderness to palpation improving from yesterday.  Right arm in sling  Psychiatric: Appropriate mood and affect.       Diagnostic Data:    Labs:  Recent Labs   Lab 24  1345 24  0426   WBC 9.7 7.5   HGB 13.7 13.2   MCV 90.9 92.3   .0 226.0        Recent Labs   Lab 04/02/24  1345 04/03/24  0426   * 136*   BUN 19 18   CREATSERUM 0.90 0.95   CA 9.7 9.2   ALB 3.0*  --     137   K 3.7 4.4  4.4    105   CO2 28.0 29.0   ALKPHO 100  --    AST 19  --    ALT 12*  --    BILT 0.3  --    TP 7.8  --        Estimated Creatinine Clearance: 47.6 mL/min (based on SCr of 0.95 mg/dL).      Microbiology    No results found for this visit on 04/02/24.      Imaging: Reviewed in Epic.    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  # Recent left elbow bursitis, with questionable septic arthritis status post IV antibiotics and was seen by ID and Ortho while at [facility] per care everywhere chart review  Continue IV antibiotics  Await ID and Ortho consult  PT OT eval  Uric acid level normal  Blood culture sent on admission, results pending  On care everywhere chart review, left elbow synovial fluid aspirate culture done on 3/15/2024 at [facility] had no growth  #Hypertension  #History of prior chronic diastolic heart failure  Continue home medications including Norvasc and lisinopril  Does not take any Lasix at home as reported by patient's daughter and granddaughter  Follow blood pressure  Echo done during recent hospital stay at [facility] during recent admission in March 2024 reportedly showed TTE: LVEF 63%. Stage 1 DD. Mild-moderate AVS. No significant other valvular abnormality.  On care everywhere chart review  #Hyperlipidemia  Statin  #Parkinson's disease  Continue home carbidopa levodopa.  Patient's daughter and granddaughter  reported that patient self discontinued his carbidopa levodopa when he went to visit [place]  PT OT  #History of gout  #History of colon cancer with prior colectomy and prior chemo  #Osteoarthritis     Discussed with patient, patient's daughter at bedside.  Discussed with RN.    Desiree Arreola MD    Supplementary Documentation:     Quality:  DVT Mechanical Prophylaxis:   SCDs,    DVT Pharmacologic Prophylaxis   Medication    enoxaparin (Lovenox) 40 MG/0.4ML SUBQ injection 40 mg                Code Status: Full Code  Prasad: No urinary catheter in place  Prasad Duration (in days):   Central line:    BRYANNA:     Discharge is dependent on: Clinical progress  At this point Mr. Shankar Randolph is expected to be discharge to: Home with 24-hour supervision from family    The 21st Century Cures Act makes medical notes like these available to patients in the interest of transparency. Please be advised this is a medical document. Medical documents are intended to carry relevant information, facts as evident, and the clinical opinion of the practitioner. The medical note is intended as peer to peer communication and may appear blunt or direct. It is written in medical language and may contain abbreviations or verbiage that are unfamiliar.             **Certification      PHYSICIAN Certification of Need for Inpatient Hospitalization - Initial Certification    Patient will require inpatient services that will reasonably be expected to span two midnight's based on the clinical documentation in H+P.   Based on patients current state of illness, I anticipate that, after discharge, patient will require TBD.

## 2024-04-03 NOTE — PLAN OF CARE
Pt A&Ox4, [place]an speaking. VSS on 2L O2 NC. Saline locked. Pain to right shoulder managed with Tylenol #3 prn. Sling in place to RUE. XR of right shoulder to be done. ID and Ortho consults to see. IV ancef q8h. Call light within reach. Will continue to monitor.

## 2024-04-04 PROBLEM — M19.029 ELBOW ARTHRITIS: Status: ACTIVE | Noted: 2024-04-04

## 2024-04-04 PROBLEM — M19.011 OSTEOARTHRITIS OF RIGHT SHOULDER: Status: ACTIVE | Noted: 2024-04-04

## 2024-04-04 PROBLEM — M19.039 WRIST ARTHRITIS: Status: ACTIVE | Noted: 2024-04-04

## 2024-04-04 LAB
CRP SERPL-MCNC: 8.95 MG/DL (ref ?–0.3)
ERYTHROCYTE [SEDIMENTATION RATE] IN BLOOD: 85 MM/HR

## 2024-04-04 PROCEDURE — 99223 1ST HOSP IP/OBS HIGH 75: CPT | Performed by: INTERNAL MEDICINE

## 2024-04-04 PROCEDURE — 99232 SBSQ HOSP IP/OBS MODERATE 35: CPT | Performed by: INTERNAL MEDICINE

## 2024-04-04 PROCEDURE — 99232 SBSQ HOSP IP/OBS MODERATE 35: CPT | Performed by: ORTHOPAEDIC SURGERY

## 2024-04-04 RX ORDER — METHYLPREDNISOLONE SODIUM SUCCINATE 125 MG/2ML
60 INJECTION, POWDER, LYOPHILIZED, FOR SOLUTION INTRAMUSCULAR; INTRAVENOUS DAILY
Status: DISCONTINUED | OUTPATIENT
Start: 2024-04-04 | End: 2024-04-05

## 2024-04-04 NOTE — CONSULTS
[facility]  Rheumatology Report of Consultation  Benjy Randolph Patient Status:  Inpatient    1935 MRN [id number]   Location [facility] 3SW-A Attending Desiree Arreola MD   Hosp Day # 2 PCP Chyna Pascual DO     Date of Admission: 2024  Date of Consult:  2024    Reason for Consultation: polyarthralgia     History of Present Illness: Benjy Randolph is a a(n) 89 year old male who present on  with worsened pain and swelling.   He was recently admitted at an outside facility () and at that time was diagnosed with a septic arthritis in the right elbow.  He had the elbow aspirated (no crystals were seen).  And he was discharged with continued antibiotics with IV vancomycin and ceftriaxone until  via PICC line.  He then presented to our ER on  due to worsening right shoulder pain as well as right hand pain and swelling.    I served a  with the assistance of patient's daughter who is at bedside.  Patient states that he had already developed right shoulder pain during his last hospitalization but was told at that time that they could not doing since he was being treated for the active infection.  At the time, he denies any residual elbow pain or swelling.  At this time his pain is located over the right shoulder as well as the right hand and fingers.  Feels pain is present only with movement and no pain at rest.  Prior to all this he claims that he was able to move his right shoulder fully without any restriction.  Denies any other joint pain  When I asked they deny knowing any history of gout and do not know what gout is.  Denies ever being on medications like allopurinol in the past.  Denies any current fevers, chills, weight loss, fatigue  Denies chest pain, shortness of breath  Denies any skin rashes  History of easy bruising  Denies abdominal pain, nausea, vomiting, diarrhea or constipation.  Denies specific morning  stiffness  Denies a history of any other joint swelling, redness or sensitivity to touch aside from the recent right elbow which was deemed to be infectious.  Unclear etiology//trigger to infection    History:  HISTORY:  Past Medical History:   Diagnosis Date    Abdominal pain of unknown etiology 10/17/2020    Alcoholism /alcohol abuse 8/30/2017    Episodic    Anesthesia complication     difficulty awakening after a surgery many years ago- needed defibrillator shock to come out per daughter,    Aortic sclerosis 2/13/2015    Aortic valve sclerosis 9/29/2015    Arthritis     Atrophic gastritis 9-    chronic, inactive    Basal cell carcinoma (BCC) 8/4/2020    BPH (benign prostatic hyperplasia)     Cancer (HCC)     skin cancer ? kind    Cataract     HAD SURGERY- ??HAS LENS IMPLANTS    Change in vision 8/4/2020    Chronic diastolic heart failure (HCC) 10/24/2020    Colon cancer (HCC) 2010    Colon polyps 9-    COVID-19 virus infection 11/24/2020    Dermatitis 10/26/2018    New. Right forearm    EKG, abnormal 2/13/2015    Elevated hemoglobin (HCC) 4/16/2018    Esophageal reflux     no longer a problem/ resolved    Essential hypertension, benign 2/5/2015    Uncontrolled     Heart murmur     Herpes zoster without complication 10/24/2020    High cholesterol     History of cardiac murmur     History of colon cancer 8/4/2020    History of hernia repair 2/11/2015    First in [place], then in [place]     History of skin cancer 9/27/2016    Hypertensive urgency     Malignant neoplasm of colon (HCC) 2/27/2012    Murmur, heart     benign    Osteoarthritis     right shouder- surgery scheduled 06/30/16, also left shoulder    Other and unspecified hyperlipidemia     Parkinson disease (HCC) 2014    Personal history of antineoplastic chemotherapy     2010    Pneumonia due to COVID-19 virus 12/11/2020    PONV (postoperative nausea and vomiting)     Postherpetic neuralgia 11/16/2020    Prurigo 8/4/2020    Retained suture  2015    S/P repair of ventral hernia 3/30/2018    SBO (small bowel obstruction) (HCC) 3/30/2018    Shoulder injury 1987    left     Suture granuloma 10/3/2017    Tubular adenoma 2014    Uncomfortable fullness after meals     Ventral hernia without obstruction or gangrene 3/21/2018    Ventral incisional hernia 2017      Past Surgical History:   Procedure Laterality Date    APPENDECTOMY      ARTHROSCOPY OF JOINT UNLISTED Bilateral     SURGERY ON BOTH SHOULDERS - UNSURE IF SCOPE OR OPEN    CATARACT Bilateral     COLECTOMY      COLONOSCOPY  2014    Dr. Priscilla [place], [place]    HERNIA SURGERY      SKIN SURGERY Left 4-14-15    MMS done for SCC, well dif, inv to left antihelix, AB    SKIN SURGERY  2015    MMS - BCC to the Left [place]     SKIN SURGERY  3/29/17    MMS- BCC-micronod to right nasofacial sulcus done by AB    UPPER GI ENDOSCOPY,EXAM        Family History   Problem Relation Age of Onset    Heart Attack Sister     Cancer Neg     Heart Disease Neg     Stroke Neg       Social History     Socioeconomic History    Marital status:    Tobacco Use    Smoking status: Former     Packs/day: 0.25     Years: 67.00     Additional pack years: 0.00     Total pack years: 16.75     Types: Cigarettes     Quit date: 3/10/2015     Years since quittin.0    Smokeless tobacco: Never   Vaping Use    Vaping Use: Never used   Substance and Sexual Activity    Alcohol use: Yes     Comment: socially    Drug use: No   Other Topics Concern     Service No    Blood Transfusions No    Caffeine Concern Yes     Comment: 2 cups of coffee daily. 1 soda weekly    Occupational Exposure No    Hobby Hazards No    Sleep Concern Yes    Stress Concern No    Weight Concern No    Special Diet No    Back Care No    Exercise Yes     Comment: Daily walking    Bike Helmet No    Seat Belt Yes    Self-Exams No     Social Determinants of Health     Food Insecurity: No Food Insecurity (2024)    Food Insecurity     Food  Insecurity: Never true   Transportation Needs: No Transportation Needs (2024)    Transportation Needs     Lack of Transportation: No   Housing Stability: Low Risk  (2024)    Housing Stability     Housing Instability: No            Allergies: No Known Allergies    Medications:    methylPREDNISolone sodium succinate (Solu-MEDROL) injection 60 mg  60 mg Intravenous Daily    [COMPLETED] ceFAZolin (Ancef) 2 g in 20mL IV syringe premix  2 g Intravenous Once    HYDROmorphone (Dilaudid) 1 MG/ML injection 0.5 mg  0.5 mg Intravenous Q30 Min PRN    [COMPLETED] ondansetron (Zofran) 4 MG/2ML injection 4 mg  4 mg Intravenous Once    amLODIPine (Norvasc) tab 10 mg  10 mg Oral Nightly    lisinopril (Prinivil; Zestril) tab 20 mg  20 mg Oral Nightly    rosuvastatin (Crestor) tab 5 mg  5 mg Oral Nightly    carbidopa-levodopa (SINEMET)  MG per tab 1 tablet  1 tablet Oral Nightly    enoxaparin (Lovenox) 40 MG/0.4ML SUBQ injection 40 mg  40 mg Subcutaneous Nightly    melatonin tab 3 mg  3 mg Oral Nightly PRN    ondansetron (Zofran) 4 MG/2ML injection 4 mg  4 mg Intravenous Q6H PRN    ceFAZolin (Ancef) 2 g in 20mL IV syringe premix  2 g Intravenous Q8H    morphINE PF 2 MG/ML injection 0.5 mg  0.5 mg Intravenous Q2H PRN    Or    morphINE PF 2 MG/ML injection 1 mg  1 mg Intravenous Q2H PRN    Or    morphINE PF 2 MG/ML injection 2 mg  2 mg Intravenous Q2H PRN    [COMPLETED] potassium chloride (K-Dur) tab 40 mEq  40 mEq Oral Once    acetaminophen-codeine (Tylenol #3) 300-30 MG per tab 1 tablet  1 tablet Oral Q4H PRN    acetaminophen (Tylenol Extra Strength) tab 500 mg  500 mg Oral Q6H PRN       Review of Systems: as above    Physical Exam:   /48 (BP Location: Left arm)   Pulse 53   Temp 97.7 °F (36.5 °C) (Oral)   Resp 16   Wt 201 lb (91.2 kg)   SpO2 93%   BMI 32.44 kg/m²   Temp (24hrs), Av °F (36.7 °C), Min:97.7 °F (36.5 °C), Max:98.8 °F (37.1 °C)       Intake/Output Summary (Last 24 hours) at 2024 1647  Last  data filed at 4/4/2024 1559  Gross per 24 hour   Intake 520 ml   Output 3 ml   Net 517 ml     Wt Readings from Last 3 Encounters:   04/02/24 201 lb (91.2 kg)   02/26/24 201 lb (91.2 kg)   02/26/24 201 lb (91.2 kg)     General: Alert and oriented in no apparent distress.  HEENT: No focal deficits.  Cardiac: Regular rate and rhythm, S1, S2 normal, + holosystolic murmur heard at all posts.   Lungs: Clear without wheezes, rales, rhonchi or dullness.  Normal excursions and effort.  Abdomen: Soft, non-tender. .  Neurologic: Alert and oriented, normal affect. Moves all four extremities   Skin: Warm and dry.  Slight redness over right hand  MSK: Right shoulder significant restriction in all fields with tenderness. Rigth elbow without tenderness but cannot fully extend d/t shoulder pain. Right hand swollen- dorsal hand and fingers diffusely. Remainder of exam grossly normal- no tenderness/swelling over left dips, pips, mcps, wrist, elbow, shoulder, b/l knees, ankles or joints of feet     Laboratory Data:  Component      Latest Ref Rng 4/2/2024 4/3/2024   WBC      4.0 - 11.0 x10(3) uL 9.7  7.5    RBC      3.80 - 5.80 x10(6)uL 4.39  4.27    Hemoglobin      13.0 - 17.5 g/dL 13.7  13.2    Hematocrit      39.0 - 53.0 % 39.9  39.4    Platelet Count      150.0 - 450.0 10(3)uL 255.0  226.0    MCV      80.0 - 100.0 fL 90.9  92.3    MCH      26.0 - 34.0 pg 31.2  30.9    MCHC      31.0 - 37.0 g/dL 34.3  33.5    RDW      % 12.0  11.9    Prelim Neutrophil Abs      1.50 - 7.70 x10 (3) uL 6.42  4.72    Neutrophils Absolute      1.50 - 7.70 x10(3) uL 6.42  4.72    Lymphocytes Absolute      1.00 - 4.00 x10(3) uL 2.11  1.87    Monocytes Absolute      0.10 - 1.00 x10(3) uL 0.92  0.68    Eosinophils Absolute      0.00 - 0.70 x10(3) uL 0.12  0.10    Basophils Absolute      0.00 - 0.20 x10(3) uL 0.07  0.07    Immature Granulocyte Absolute      0.00 - 1.00 x10(3) uL 0.04  0.02    Neutrophils %      % 66.4  63.3    Lymphocytes %      % 21.8  25.1     Monocytes %      % 9.5  9.1    Eosinophils %      % 1.2  1.3    Basophils %      % 0.7  0.9    Immature Granulocyte %      % 0.4  0.3    Glucose      70 - 99 mg/dL 114 (H)  136 (H)    Sodium      136 - 145 mmol/L 138  137    Potassium      3.5 - 5.1 mmol/L 3.7  4.4    Potassium      3.5 - 5.1 mmol/L  4.4    Chloride      98 - 112 mmol/L 104  105    Carbon Dioxide, Total      21.0 - 32.0 mmol/L 28.0  29.0    ANION GAP      0 - 18 mmol/L 6  3    BUN      9 - 23 mg/dL 19  18    CREATININE      0.70 - 1.30 mg/dL 0.90  0.95    CALCIUM      8.5 - 10.1 mg/dL 9.7  9.2    CALCULATED OSMOLALITY      275 - 295 mOsm/kg 289  288    EGFR      >=60 mL/min/1.73m2 82  77    AST (SGOT)      15 - 37 U/L 19     ALT (SGPT)      16 - 61 U/L 12 (L)     ALKALINE PHOSPHATASE      45 - 117 U/L 100     Total Bilirubin      0.1 - 2.0 mg/dL 0.3     PROTEIN, TOTAL      6.4 - 8.2 g/dL 7.8     Albumin      3.4 - 5.0 g/dL 3.0 (L)     Globulin      2.8 - 4.4 g/dL 4.8 (H)     A/G Ratio      1.0 - 2.0  0.6 (L)     C-REACTIVE PROTEIN      <0.30 mg/dL 3.11 (H)     LACTIC ACID      0.4 - 2.0 mmol/L 1.2     URIC ACID      3.5 - 7.2 mg/dL 5.2        OSH:  03/2024  ESR 35  CRP 49.2  Uric acid 4.7  Component  Ref Range & Units 3/15/24  1:22 AM   Color, Body Fluid Pink   Clarity, Body Fluid  Clear 2+ Abnormal    Total Nucleated Cells, Body Fluid  cells/uL 78,760   RBC, Body Fluid  cells/uL 10,000   Neutrophils, Body Fluid  % 93   Lymphocytes, Body Fluid  % 1   Monocytes, Body Fluid  % 6   Number of Cells Counted, Body Fluid 100     Component  Ref Range & Units 3/15/24  1:22 AM   Synovial Crystals  No Crystals Seen        Imaging:   Narrative   PROCEDURE:  XR WRIST (2 VIEWS), LEFT (CPT=73100)     INDICATIONS:  swelling / redness     COMPARISON:  None.     PATIENT STATED HISTORY: (As transcribed by Technologist)  Patient offered no additional history at this time.         Impression   CONCLUSION:       Osteopenia degrades sensitivity for fracture detection.   Within this limitation, there is no appreciable acute fracture or traumatic malalignment.  Advanced arthropathy of the radiocarpal and distal radioulnar joints with chronic fragmentation of the  ulnar styloid process.  Mild soft tissue swelling diffusely about the wrist.        LOCATION:  [facility]        Dictated by (CST): Zaynab Turcios MD on 4/03/2024 at 9:03 PM      Finalized by (CST): Zaynab Turcios MD on 4/03/2024 at 9:04 PM       Narrative   PROCEDURE:  XR ELBOW, (2 VIEWS), LEFT (CPT=73070)     INDICATIONS:  swelling and redness     COMPARISON:  None.     PATIENT STATED HISTORY: (As transcribed by Technologist)  Patient offered no additional history at this time.        Impression   CONCLUSION:       Osteopenia degrades sensitivity for fracture detection.  Within this limitation, there is no appreciable acute fracture or traumatic malalignment.  Advanced arthropathy of the radiocapitellar articulation with flattening and degenerative spurring of the  radial head.  No appreciable elbow joint effusion.  No focal soft tissue swelling.        LOCATION:  [facility]        Dictated by ([facility]): Zaynab Turcios MD on 4/03/2024 at 9:05 PM      Finalized by ([facility]): Zaynab Turcios MD on 4/03/2024 at 9:06 PM      Narrative   PROCEDURE:  XR SHOULDER, COMPLETE (MIN 2 VIEWS), RIGHT (CPT=73030)     TECHNIQUE:  Multiple views were obtained.     COMPARISON:  None.     INDICATIONS:  R shoulder pain     PATIENT STATED HISTORY: (As transcribed by Technologist)  The patient offered no additional history at this point.         FINDINGS:       Marked loss of right glenohumeral joint space with subchondral sclerosis along with large right humeral head osteophyte.  Inferior glenoid osteophytes noted.     Subacromial osteophytes noted.     Moderate right acromioclavicular joint hypertrophic changes is noted.        Impression   CONCLUSION:  Marked osteoarthritic changes of the right shoulder.  An acute fracture is not identified.        LOCATION:  [facility]         Dictated by (CST): Santos Mejia MD on 4/03/2024 at 11:06 AM      Finalized by (CST): Santos Mejia MD on 4/03/2024 at 11:07 AM      Assessment:  Inflammatory polyarthritis  Right shoulder adhesive capsulitis   Cellulitis right UE  Recent elbow septic arthritis vs septic bursitis s/p IV abx  HTN  HLD  Parkinson's  ?hx of gout per chart but denied by pt and pt's daughter     Patient Active Problem List   Diagnosis    Parkinson disease (HCC)    Essential hypertension, benign    Heart murmur    Bilateral lower extremity edema    Right shoulder pain    Glenohumeral arthritis - right    Asymptomatic LV dysfunction    Aortic valve sclerosis    Hypercholesterolemia    Hyperglycemia    LVH (left ventricular hypertrophy)    Left atrial dilation    Mitral valve sclerosis    Chronic diastolic heart failure (HCC)    Primary osteoarthritis of first carpometacarpal joint of right hand    Ground glass opacity present on imaging of lung    Benign prostatic hyperplasia    Prediabetes    Retrolisthesis    History of skin cancer    Advanced age    Frail elderly    Lumbar back pain with radiculopathy affecting right lower extremity    Difficulty walking    Neoplasm of uncertain behavior of skin    Alcohol use disorder, moderate, in sustained remission (HCC)    Small vessel disease (HCC)    Abdominal hernia without obstruction and without gangrene    Hypoalbuminemia due to protein-calorie malnutrition (HCC)    Atherosclerosis of abdominal aorta (HCC)    Calculus of gallbladder without cholecystitis without obstruction    Uncontrolled hypertension    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 Benjy Randolph is a 88 yo man with hx of colon cancer and skin cancer as well as HTN, HLD and OA who was recenty dx of right elbow  bursitis, septic arthritis and cellulitis s/p prolonged IV abx. He presents with acute on chronic right shoulder pain with severe limitation as well as right hand swelling. He lacks other obvious signs/symptoms of inflammatory arthritis but does not seem to be the best historian. Will work up for possible PMR vs RA for completeness sake. Unclear if he has hx of gout, recent uric acid levels were well below 6.9, although not the most dependable during acute attack. Will treat as if nonspecific inflammatory arthritis and do trial of IV methylpred and monitor response to determine oral pred taper. No ULT indicated at this time    Recommendations:   -- discussed case with ID, okay with steroids  -- IV methylpred 60mg daily today/tomorrow and monitor response  -- ESR/CRP came back quite elevated compared to previously  -- may benefit from intra-articular steroid injection in the future for adhesive capsulitis   -- RF/CCP with AM labs and monitor ESR/CRP    Thank you for allowing me to participate in the care of your patient.  Discussed with RN and Dr. Myke Ortiz, DO  [facility]  4/4/2024    Time spent: 60min

## 2024-04-04 NOTE — OCCUPATIONAL THERAPY NOTE
OCCUPATIONAL THERAPY EVALUATION - INPATIENT    Room Number: 365/365-A  Evaluation Date: 4/4/2024     Type of Evaluation: Initial  Presenting Problem: RUE cellulitis    Physician Order: IP Consult to Occupational Therapy  Reason for Therapy:  ADL/IADL Dysfunction and Discharge Planning      OCCUPATIONAL THERAPY ASSESSMENT   Patient is a 89 year old male admitted on 4/2/2024 with Presenting Problem: RUE cellulitis. Co-Morbidities : HTN, parkinsons  Patient is currently functioning at baseline with toileting, bathing, upper body dressing, lower body dressing, grooming, bed mobility, transfers, stating sitting balance, dynamic sitting balance, static standing balance, dynamic standing balance, maintaining seated position, functional standing tolerance, and energy conservation strategies.  Prior to admission, patient's baseline is ind with ADLs has assist for driving.  Patient met all OT goals at Supervision level.  Patient reports no further questions/concerns at this time.     Patient will benefit from continued skilled OT Services at discharge to promote functional independence in home.  Anticipate patient will return home with home health OT      WEIGHT BEARING RESTRICTION  Weight Bearing Restriction: None                Recommendations for nursing staff:   Transfers: Supervision  Toileting location: Toilet    EVALUATION SESSION:  Patient at start of session: Supine in bed  FUNCTIONAL TRANSFER ASSESSMENT  Sit to Stand: Edge of Bed; Chair  Edge of Bed: Supervision  Chair: Supervision  Toilet Transfer: Supervision    BED MOBILITY  Rolling: Supervision  Supine to Sit : Supervision  Sit to Supine (OT): Supervision    BALANCE ASSESSMENT  Static Sitting: Supervision  Sitting Bilateral: Supervision  Static Standing: Supervision  Standing Bilateral: Supervision    FUNCTIONAL ADL ASSESSMENT  Eating: Supervision  Grooming Seated: Supervision  Grooming Standing: Supervision  LB Dressing Seated: Supervision  LB Dressing  Standing: Supervision  Toileting Seated: Supervision      ACTIVITY TOLERANCE: Ed regarding pacing and EC  Pulse:  (Asymptomatic vitals throughout session)                      O2 SATURATIONS       COGNITION  Overall Cognitive Status:  WFL - within functional limits  COGNITION ASSESSMENTS       Upper Extremity:   ROM: within functional limits  WFL  Strength: is within functional limits  WFL  Coordination:  Gross motor: WFL  Fine motor: WFL  Sensation: Light touch:  intact    EDUCATION PROVIDED  Patient: Role of Occupational Therapy; Plan of Care; Discharge Recommendations; Adaptive Equipment Recommendations; DME Recommendations; Functional Transfer Techniques; Fall Prevention; Energy Conservation; Compensatory ADL Techniques; Proper Body Mechanics  Patient's Response to Education: Verbalized Understanding; Returned Demonstration    Equipment used:   Demonstrates functional use    Therapist comments: Pt presents supine in bed. Pt ed regarding role of OT and POC throughout stay. OT facilitated compensatory ADL completion strategies. Pt ed regarding safe functional transfers with proper body mechanics. Pt ed regarding sling for comfort. Pt ed regarding UB dressing and dressing RUE donning first. Pt ed regarding equipment to assist PRN. Pt expressing no additional acute OT needs at this time.      Patient End of Session: Up in chair;Needs met;Call light within reach;RN aware of session/findings;All patient questions and concerns addressed;Alarm set    OCCUPATIONAL PROFILE    HOME SITUATION  Type of Home: House  Home Layout: Two level  Lives With: Son (DIL)    Toilet and Equipment: Standard height toilet  Shower/Tub and Equipment: Tub-shower combo             Drives: No       Prior Level of Function: Ind with ADLs has family to assist PRN    SUBJECTIVE  \"I feel about normal my arm just bothers me\"    PAIN ASSESSMENT  Ratin  Location: RUE  Management Techniques: Breathing techniques;Body  mechanics    OBJECTIVE  Precautions: Bed/chair alarm (sling for comfort)  Fall Risk: Standard fall risk    WEIGHT BEARING RESTRICTION  Weight Bearing Restriction: None                AM-PAC ‘6-Clicks’ Inpatient Daily Activity Short Form  -   Putting on and taking off regular lower body clothing?: None  -   Bathing (including washing, rinsing, drying)?: None  -   Toileting, which includes using toilet, bedpan or urinal? : None  -   Putting on and taking off regular upper body clothing?: None  -   Taking care of personal grooming such as brushing teeth?: None  -   Eating meals?: None    AM-PAC Score:  Score: 24  Approx Degree of Impairment: 0%  Standardized Score (AM-PAC Scale): 57.54      ADDITIONAL TESTS     NEUROLOGICAL FINDINGS        PLAN   Patient has been evaluated and presents with no skilled Occupational Therapy needs at this time.  Patient discharged from Occupational Therapy services.  Please re-order if a new functional limitation presents during this admission.      Patient Evaluation Complexity Level:   Occupational Profile/Medical History LOW - Brief history including review of medical or therapy records    Specific performance deficits impacting engagement in ADL/IADL LOW  1 - 3 performance deficits    Client Assessment/Performance Deficits LOW - No comorbidities nor modifications of tasks    Clinical Decision Making LOW - Analysis of occupational profile, problem-focused assessments, limited treatment options    Overall Complexity LOW     OT Session Time: 33 minutes  Self-Care Home Management: 6 minutes  Therapeutic Activity: 16 minutes  Neuromuscular Re-education: 0 minutes  Therapeutic Exercise: 0 minutes  Cognitive Skills: 0 minutes  Sensory Integrative: 0 minutes  Orthotic Management and Trainin minutes  Can add/delete any of these

## 2024-04-04 NOTE — PLAN OF CARE
Patient A&Ox4, VSS on RA, , tele; SB/NSR.  services utilized. Patient denies pain when resting. Pain and tenderness noted to shoulder, elbow and wrist. Ice applied. Sling for comfort. Spoke with MD regarding gout medications. Rheum consult order placed. Plan to continue IV antibiotics and see new consult for recommendations. Plan of care reviewed with patient and daughter at bedside. Patient demonstrates understanding.

## 2024-04-04 NOTE — PHYSICAL THERAPY NOTE
PHYSICAL THERAPY EVALUATION - INPATIENT     Room Number: 365/365-A  Evaluation Date: 4/4/2024  Type of Evaluation: Initial  Physician Order: PT Eval and Treat    Presenting Problem: R shoulder/elbow pain  Co-Morbidities : LUE septic PICC line, colon cancer, gout, HLD, CHF, HTN, Parkisonsons  Reason for Therapy: Mobility Dysfunction and Discharge Planning    XRAY IMAGING:  Right Shoulder: Marked loss of right glenohumeral joint space with subchondral sclerosis along with large right humeral head osteophyte.  Inferior glenoid osteophytes noted.   Subacromial osteophytes noted. Moderate right acromioclavicular joint hypertrophic changes is noted.  Right Elbow: Osteopenia degrades sensitivity for fracture detection.  Within this limitation, there is no appreciable acute fracture or traumatic malalignment.  Advanced arthropathy of the radiocapitellar articulation with flattening and degenerative spurring of the   radial head.  No appreciable elbow joint effusion.  No focal soft tissue swelling.   RIGHT WRIST:  Osteopenia degrades sensitivity for fracture detection.  Within this limitation, there is no appreciable acute fracture or traumatic malalignment.  Advanced arthropathy of the radiocarpal and distal radioulnar joints with chronic fragmentation of the ulnar styloid process.  Mild soft tissue swelling diffusely about the wrist.    PHYSICAL THERAPY ASSESSMENT   Patient is a 89 year old male admitted 4/2/2024 for R shoulder pain/ RUE cellulitis.   Patient is currently functioning at baseline with bed mobility, transfers, gait, stair negotiation, maintaining seated position, and standing prolonged periods. Prior to admission, patient's baseline is indep.     Patient currently does not meet criteria for skilled inpatient physical therapy services, however patient will continue to benefit from QID ambulation with nursing staff.  Pt is discharged from IP PT services.  RN aware to re-order if there is a decline in  mobility status.      PLAN  Patient has been evaluated and presents with no skilled Physical Therapy needs at this time.  Patient discharged from Physical Therapy services.  Please re-order if a new functional limitation presents during this admission.    GOALS  Patient was able to achieve the following goals ...    Patient was able to transfer At previous, functional level  Safely and independently   Patient able to ambulate on level surfaces At previous, functional level  Safely and independently     HOME SITUATION  Type of Home: House   Home Layout: Two level  Stairs to Enter : 2  Railing: No  Stairs to Bedroom: 13  Railing: Yes    Lives With: Son  Drives: No  Patient Owned Equipment: None     Prior Level of [place]: Per pt lives in two story home with 2 steps to enter, 13 steps. Has seven children that he rotates staying with for a couple months at a time. Typically independent with all mobility. No use of device. Does not drive.     SUBJECTIVE  OT fluent in Lao conversing throughout with pt and translating for writer.       OBJECTIVE  Precautions: Bed/chair alarm (Sling for UE comfort)  Fall Risk: Standard fall risk    WEIGHT BEARING RESTRICTION  Weight Bearing Restriction: None                PAIN ASSESSMENT  Rating: Unable to rate  Location: right shoulder  Management Techniques: Activity promotion;Body mechanics;Repositioning    COGNITION  Overall Cognitive Status:  WFL - within functional limits    RANGE OF MOTION AND STRENGTH ASSESSMENT  Upper extremity ROM and strength are within functional limits   Lower extremity ROM is within functional limits   Lower extremity strength is within functional limits       BALANCE  Static Sitting: Good  Dynamic Sitting: Fair +  Static Standing: Fair  Dynamic Standing: Fair -    ADDITIONAL TESTS                                    ACTIVITY TOLERANCE                         O2 WALK       NEUROLOGICAL FINDINGS                        AM-PAC '6-Clicks' INPATIENT SHORT  FORM - BASIC MOBILITY  How much difficulty does the patient currently have...  Patient Difficulty: Turning over in bed (including adjusting bedclothes, sheets and blankets)?: A Little   Patient Difficulty: Sitting down on and standing up from a chair with arms (e.g., wheelchair, bedside commode, etc.): A Little   Patient Difficulty: Moving from lying on back to sitting on the side of the bed?: A Little   How much help from another person does the patient currently need...   Help from Another: Moving to and from a bed to a chair (including a wheelchair)?: A Little   Help from Another: Need to walk in hospital room?: A Little   Help from Another: Climbing 3-5 steps with a railing?: A Little       AM-PAC Score:  Raw Score: 18   Approx Degree of Impairment: 46.58%   Standardized Score (AM-PAC Scale): 43.63   CMS Modifier (G-Code): CK    FUNCTIONAL ABILITY STATUS  Gait Assessment   Functional Mobility/Gait Assessment  Gait Assistance: Supervision;Modified independent  Distance (ft): 150, 150  Assistive Device: None  Pattern: Within Functional Limits (minor imbalances probably due to parkinsons)  Stairs: Stairs  How Many Stairs: 4  Device: 1 Rail  Assist: Supervision  Pattern: Ascend and Descend  Ascend and Descend : Reciprocal    Skilled Therapy Provided     Bed Mobility: NT- denies concerns with getting in/out of bed    Transfer Mobility:  Sit to stand: supervision   Stand to sit: supervision  Gait = supervision w/o device x 150 feet x 2. Normal gait speed. Minor imbalances when co-tasking (conversing and walking) - demonstrates slight lateral swaying.    Therapist's comments:  Patient presents sitting up in bedside chair. Daughter present in room. Co-treating OTChapin, conversing with patient in Jamaican. Discussed role and goal of physical therapy in hospital setting. Pt in agreement to session. Reports some pain/discomfort to R shoulder area.  Sit to stand at supervision. Ambulated 150 feet w/o device; see above for  specifics. Ascended/descended 4 steps with use of rail at supervision. Ambulated 150 feet back to room w/o device. Upright in chair at end of session. Discussed importance of continued out of bed mobility, encouraged continued functional ambulation with nursing staff as well. Pt and daughter verbalize understanding. No further questions/concerns. RN/PCT aware.     Exercise/Education Provided:  Body mechanics  Energy conservation  Functional activity tolerated  Gait training  Posture  Transfer training    Patient End of Session: Up in chair;Needs met;Call light within reach;RN aware of session/findings;All patient questions and concerns addressed;Alarm set;Family present;Discussed recommendations with /    Patient Evaluation Complexity Level:  History Moderate - 1 or 2 personal factors and/or co-morbidities   Examination of body systems Low - addressing 1-2 elements   Clinical Presentation Low - Stable   Clinical Decision Making Low Complexity       PT Session Time: 20 minutes  Gait Trainin minutes

## 2024-04-04 NOTE — PROGRESS NOTES
[facility]  Progress Note    Benjy Randolph Patient Status:  Inpatient    1935 MRN [id number]   [facility] 3SW-A Attending Desiree Arreola MD   Hosp Day # 2 PCP Chyna Pascual DO     Subjective:  Benjy Randolph is a(n) 89 year old male assessed at the bedside with his daughter present.  Patient relates no appreciable worsening and perhaps some slight improvement overnight.  Events of the day also reviewed with the RN.  Patient was seen by rheumatology and Toradol discontinued with plans for IV steroids.    Objective/Physical Exam:  General: Alert, orientated x3.  Cooperative.  No apparent distress.  Vital Signs:  Blood pressure 120/48, pulse 53, temperature 97.7 °F (36.5 °C), temperature source Oral, resp. rate 16, weight 201 lb (91.2 kg), SpO2 93%.  Extremities: Right upper extremity exam reveals diminished erythema and swelling.  Active range of motion is improved at the hand wrist and elbow, although the shoulder remains painful and stiff.  No palpable swelling, effusion about the elbow or shoulder.  Neurovascularly intact distally at the hand and wrist.    Labs:     Recent Results (from the past 72 hour(s))   Comp Metabolic Panel (14)    Collection Time: 24  1:45 PM   Result Value Ref Range    Glucose 114 (H) 70 - 99 mg/dL    Sodium 138 136 - 145 mmol/L    Potassium 3.7 3.5 - 5.1 mmol/L    Chloride 104 98 - 112 mmol/L    CO2 28.0 21.0 - 32.0 mmol/L    Anion Gap 6 0 - 18 mmol/L    BUN 19 9 - 23 mg/dL    Creatinine 0.90 0.70 - 1.30 mg/dL    Calcium, Total 9.7 8.5 - 10.1 mg/dL    Calculated Osmolality 289 275 - 295 mOsm/kg    eGFR-Cr 82 >=60 mL/min/1.73m2    AST 19 15 - 37 U/L    ALT 12 (L) 16 - 61 U/L    Alkaline Phosphatase 100 45 - 117 U/L    Bilirubin, Total 0.3 0.1 - 2.0 mg/dL    Total Protein 7.8 6.4 - 8.2 g/dL    Albumin 3.0 (L) 3.4 - 5.0 g/dL    Globulin  4.8 (H) 2.8 - 4.4 g/dL    A/G Ratio 0.6 (L) 1.0 - 2.0   C-Reactive Protein    Collection Time:  04/02/24  1:45 PM   Result Value Ref Range    C-Reactive Protein 3.11 (H) <0.30 mg/dL   Blood Culture    Collection Time: 04/02/24  1:45 PM    Specimen: Blood,peripheral   Result Value Ref Range    Blood Culture Result No Growth 1 Day    Lactic Acid, Plasma    Collection Time: 04/02/24  1:45 PM   Result Value Ref Range    Lactic Acid 1.2 0.4 - 2.0 mmol/L   Uric Acid    Collection Time: 04/02/24  1:45 PM   Result Value Ref Range    Uric Acid 5.2 3.5 - 7.2 mg/dL   CBC W/ DIFFERENTIAL    Collection Time: 04/02/24  1:45 PM   Result Value Ref Range    WBC 9.7 4.0 - 11.0 x10(3) uL    RBC 4.39 3.80 - 5.80 x10(6)uL    HGB 13.7 13.0 - 17.5 g/dL    HCT 39.9 39.0 - 53.0 %    .0 150.0 - 450.0 10(3)uL    MCV 90.9 80.0 - 100.0 fL    MCH 31.2 26.0 - 34.0 pg    MCHC 34.3 31.0 - 37.0 g/dL    RDW 12.0 %    Neutrophil Absolute Prelim 6.42 1.50 - 7.70 x10 (3) uL    Neutrophil Absolute 6.42 1.50 - 7.70 x10(3) uL    Lymphocyte Absolute 2.11 1.00 - 4.00 x10(3) uL    Monocyte Absolute 0.92 0.10 - 1.00 x10(3) uL    Eosinophil Absolute 0.12 0.00 - 0.70 x10(3) uL    Basophil Absolute 0.07 0.00 - 0.20 x10(3) uL    Immature Granulocyte Absolute 0.04 0.00 - 1.00 x10(3) uL    Neutrophil % 66.4 %    Lymphocyte % 21.8 %    Monocyte % 9.5 %    Eosinophil % 1.2 %    Basophil % 0.7 %    Immature Granulocyte % 0.4 %   Blood Culture    Collection Time: 04/02/24  1:47 PM    Specimen: Blood,peripheral   Result Value Ref Range    Blood Culture Result No Growth 1 Day    Basic Metabolic Panel (8)    Collection Time: 04/03/24  4:26 AM   Result Value Ref Range    Glucose 136 (H) 70 - 99 mg/dL    Sodium 137 136 - 145 mmol/L    Potassium 4.4 3.5 - 5.1 mmol/L    Chloride 105 98 - 112 mmol/L    CO2 29.0 21.0 - 32.0 mmol/L    Anion Gap 3 0 - 18 mmol/L    BUN 18 9 - 23 mg/dL    Creatinine 0.95 0.70 - 1.30 mg/dL    Calcium, Total 9.2 8.5 - 10.1 mg/dL    Calculated Osmolality 288 275 - 295 mOsm/kg    eGFR-Cr 77 >=60 mL/min/1.73m2   Potassium    Collection Time:  04/03/24  4:26 AM   Result Value Ref Range    Potassium 4.4 3.5 - 5.1 mmol/L   CBC W/ DIFFERENTIAL    Collection Time: 04/03/24  4:26 AM   Result Value Ref Range    WBC 7.5 4.0 - 11.0 x10(3) uL    RBC 4.27 3.80 - 5.80 x10(6)uL    HGB 13.2 13.0 - 17.5 g/dL    HCT 39.4 39.0 - 53.0 %    .0 150.0 - 450.0 10(3)uL    MCV 92.3 80.0 - 100.0 fL    MCH 30.9 26.0 - 34.0 pg    MCHC 33.5 31.0 - 37.0 g/dL    RDW 11.9 %    Neutrophil Absolute Prelim 4.72 1.50 - 7.70 x10 (3) uL    Neutrophil Absolute 4.72 1.50 - 7.70 x10(3) uL    Lymphocyte Absolute 1.87 1.00 - 4.00 x10(3) uL    Monocyte Absolute 0.68 0.10 - 1.00 x10(3) uL    Eosinophil Absolute 0.10 0.00 - 0.70 x10(3) uL    Basophil Absolute 0.07 0.00 - 0.20 x10(3) uL    Immature Granulocyte Absolute 0.02 0.00 - 1.00 x10(3) uL    Neutrophil % 63.3 %    Lymphocyte % 25.1 %    Monocyte % 9.1 %    Eosinophil % 1.3 %    Basophil % 0.9 %    Immature Granulocyte % 0.3 %   Sed Rate, Westergren (Automated)    Collection Time: 04/04/24  2:46 PM   Result Value Ref Range    Sed Rate 85 (H) 0 - 20 mm/Hr   C-Reactive Protein    Collection Time: 04/04/24  2:46 PM   Result Value Ref Range    C-Reactive Protein 8.95 (H) <0.30 mg/dL     XR ELBOW, (2 VIEWS), LEFT (CPT=73070)    Result Date: 4/3/2024  CONCLUSION:   Osteopenia degrades sensitivity for fracture detection.  Within this limitation, there is no appreciable acute fracture or traumatic malalignment.  Advanced arthropathy of the radiocapitellar articulation with flattening and degenerative spurring of the radial head.  No appreciable elbow joint effusion.  No focal soft tissue swelling.   LOCATION:  [facility]   Dictated by (CST): Zaynab Turcios MD on 4/03/2024 at 9:05 PM     Finalized by (CST): Zaynab Turcios MD on 4/03/2024 at 9:06 PM       XR WRIST (2 VIEWS), LEFT (CPT=73100)    Result Date: 4/3/2024  CONCLUSION:   Osteopenia degrades sensitivity for fracture detection.  Within this limitation, there is no appreciable acute fracture or  traumatic malalignment.  Advanced arthropathy of the radiocarpal and distal radioulnar joints with chronic fragmentation of the ulnar styloid process.  Mild soft tissue swelling diffusely about the wrist.   LOCATION:  [facility]   Dictated by (CST): Zaynab Turcios MD on 4/03/2024 at 9:03 PM     Finalized by (CST): Zaynab Turcios MD on 4/03/2024 at 9:04 PM       XR SHOULDER, COMPLETE (MIN 2 VIEWS), RIGHT (CPT=73030)    Result Date: 4/3/2024  CONCLUSION:  Marked osteoarthritic changes of the right shoulder.  An acute fracture is not identified.   LOCATION:  [facility]   Dictated by (CST): Santos Mejia MD on 4/03/2024 at 11:06 AM     Finalized by (CST): Santos Mejia MD on 4/03/2024 at 11:07 AM       US VENOUS DOPPLER ARM RIGHT - DIAG IMG (CPT=93971)    Result Date: 4/2/2024  CONCLUSION:  No evidence of DVT in the right upper extremity.   LOCATION:  [id number]   Dictated by (CST): Santos Mejia MD on 4/02/2024 at 2:38 PM     Finalized by (CST): Santos Mejia MD on 4/02/2024 at 2:38 PM          Assessment/Plan:  Patient Active Problem List   Diagnosis    Parkinson disease (HCC)    Essential hypertension, benign    Heart murmur    Bilateral lower extremity edema    Right shoulder pain    Glenohumeral arthritis - right    Asymptomatic LV dysfunction    Aortic valve sclerosis    Hypercholesterolemia    Hyperglycemia    LVH (left ventricular hypertrophy)    Left atrial dilation    Mitral valve sclerosis    Chronic diastolic heart failure (HCC)    Primary osteoarthritis of first carpometacarpal joint of right hand    Ground glass opacity present on imaging of lung    Benign prostatic hyperplasia    Prediabetes    Retrolisthesis    History of skin cancer    Advanced age    Frail elderly    Lumbar back pain with radiculopathy affecting right lower extremity    Difficulty walking    Neoplasm of uncertain behavior of skin    Alcohol use disorder, moderate, in sustained remission (HCC)    Small vessel disease (HCC)     Abdominal hernia without obstruction and without gangrene    Hypoalbuminemia due to protein-calorie malnutrition (HCC)    Atherosclerosis of abdominal aorta (HCC)    Calculus of gallbladder without cholecystitis without obstruction    Uncontrolled hypertension    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  Imaging of the elbow and wrist show evidence of significant joint degeneration which may be consistent with an inflammatory polyarthropathy.  Appreciate rheumatology input and agree with monitoring response to steroids.  Continue with antibiotics per ID.  Will continue to follow.    Tata Zhang MD  4/4/2024  6:25 PM

## 2024-04-04 NOTE — PROGRESS NOTES
[facility]   part of [facility]     Hospitalist Progress Note     Benjy Randolph Patient Status:  Inpatient    1935 MRN [id number]   Location [facility] 3SW-A Attending Desiree Arreola MD   Hosp Day # 2 PCP Chyna Pascual DO     Chief Complaint: Right hand redness and swelling and pain, right shoulder pain with inability to lift his right arm up.    Subjective:     Patient sitting up in the chair.  Patient seen with patient's daughter at bedside who is helping to translate Gibraltarian for patient.  Denies any chest pain or shortness of breath.  Right arm pain improving slightly from yesterday.  Still has right shoulder pain.  Patient remains afebrile.  Denies abdominal pain.  No nausea vomiting.  Tolerating diet.  Able to raise the right arm up 45 degrees today and the pain started again.      Objective:    Review of Systems:   A comprehensive review of systems was completed; pertinent positive and negatives stated in subjective.    Vital signs:  Temp:  [97.7 °F (36.5 °C)-98.8 °F (37.1 °C)] 97.9 °F (36.6 °C)  Pulse:  [51-62] 51  Resp:  [16-18] 16  BP: (137-146)/(50-61) 137/55  SpO2:  [91 %-97 %] 96 %    Physical Exam:    /55 (BP Location: Left arm)   Pulse 51   Temp 97.9 °F (36.6 °C) (Oral)   Resp 16   Wt 201 lb (91.2 kg)   SpO2 96%   BMI 32.44 kg/m²     General: No acute distress.   HEENT: Moist mucous membranes.  Respiratory: Clear to auscultation bilaterally.  No wheezes. No rhonchi.  Cardiovascular: S1, S2.  Regular rate and rhythm.    Abdomen: Soft, nontender, nondistended.  Positive bowel sounds.   Neurologic: Awake alert, no focal neurological deficits.  Musculoskeletal: Right shoulder tenderness and stiffness and has right frozen shoulder.  Right upper extremity erythema and swelling and tenderness to palpation continuing to improve  Psychiatric: Appropriate mood and affect.       Diagnostic Data:    Labs:  Recent Labs   Lab 24  1345 24  0426   WBC 9.7 7.5    HGB 13.7 13.2   MCV 90.9 92.3   .0 226.0       Recent Labs   Lab 04/02/24  1345 04/03/24  0426   * 136*   BUN 19 18   CREATSERUM 0.90 0.95   CA 9.7 9.2   ALB 3.0*  --     137   K 3.7 4.4  4.4    105   CO2 28.0 29.0   ALKPHO 100  --    AST 19  --    ALT 12*  --    BILT 0.3  --    TP 7.8  --        Estimated Creatinine Clearance: 47.6 mL/min (based on SCr of 0.95 mg/dL).      Microbiology    Hospital Encounter on 04/02/24   1. Blood Culture     Status: None (Preliminary result)    Collection Time: 04/02/24  1:47 PM    Specimen: Blood,peripheral   Result Value Ref Range    Blood Culture Result No Growth 1 Day N/A         Imaging: Reviewed in Epic.    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  # Recent left elbow bursitis, with questionable septic arthritis status post IV antibiotics and was seen by ID and Ortho while at [facility] per care everywhere chart review  Continue IV antibiotics  Appreciate ID and Ortho consult  PT OT eval  Uric acid level normal  Blood culture sent on admission with no growth so far  On care everywhere chart review, left elbow synovial fluid aspirate culture done on 3/15/2024 at [facility] had no growth  Toradol every 6 hours  #Hypertension  #History of prior chronic diastolic heart failure  Continue home medications including Norvasc and lisinopril  Does not take any Lasix at home as reported by patient's daughter and granddaughter  Follow blood pressure  Echo done during recent hospital stay at [facility]  during recent admission in March 2024 reportedly showed TTE: LVEF 63%. Stage 1 DD. Mild-moderate AVS. No significant other valvular abnormality.  On care everywhere chart review  #Hyperlipidemia  Statin  #Parkinson's disease  Continue home carbidopa levodopa.  Patient's daughter and granddaughter reported that patient self discontinued his carbidopa levodopa when he went to visit [place]  PT OT  #History of gout  Uric acid level normal   Will have rheumatology on consult  #History of colon cancer with prior colectomy and prior chemo  #Osteoarthritis     Discussed with patient, patient's daughter at bedside.  Discussed with RN.  Discharge planning once cleared by ID, Ortho and rheumatology; antibiotics at the time of discharge per ID.  Follow-up with ID Ortho and rheumatology as outpatient as directed.  Follow-up with regular outpatient primary care physician Chyna Pascual DO within 1 week in office.    Desiree Arreola MD    Supplementary Documentation:     Quality:  DVT Mechanical Prophylaxis:   SCDs,    DVT Pharmacologic Prophylaxis   Medication    enoxaparin (Lovenox) 40 MG/0.4ML SUBQ injection 40 mg                Code Status: Full Code  Prasad: No urinary catheter in place  Prasad Duration (in days):   Central line:    BRYANNA:     Discharge is dependent on: Clinical progress  At this point Mr. Shankar Randolph is expected to be discharge to: Home with 24-hour supervision from family    The 21st Century Cures Act makes medical notes like these available to patients in the interest of transparency. Please be advised this is a medical document. Medical documents are intended to carry relevant information, facts as evident, and the clinical opinion of the practitioner. The medical note is intended as peer to peer communication and may appear blunt or direct. It is written in medical language and may contain abbreviations or verbiage that are unfamiliar.             **Certification      PHYSICIAN Certification of Need for  Inpatient Hospitalization - Initial Certification    Patient will require inpatient services that will reasonably be expected to span two midnight's based on the clinical documentation in H+P.   Based on patients current state of illness, I anticipate that, after discharge, patient will require TBD.

## 2024-04-05 VITALS
DIASTOLIC BLOOD PRESSURE: 55 MMHG | RESPIRATION RATE: 20 BRPM | WEIGHT: 201 LBS | SYSTOLIC BLOOD PRESSURE: 149 MMHG | TEMPERATURE: 99 F | BODY MASS INDEX: 32 KG/M2 | HEART RATE: 58 BPM | OXYGEN SATURATION: 95 %

## 2024-04-05 PROBLEM — M15.9 OSTEOARTHRITIS OF MULTIPLE JOINTS: Status: ACTIVE | Noted: 2024-04-05

## 2024-04-05 LAB
CRP SERPL-MCNC: 5.98 MG/DL (ref ?–0.3)
ERYTHROCYTE [SEDIMENTATION RATE] IN BLOOD: 72 MM/HR
RHEUMATOID FACT SERPL-ACNC: <10 IU/ML (ref ?–15)

## 2024-04-05 PROCEDURE — 99233 SBSQ HOSP IP/OBS HIGH 50: CPT | Performed by: INTERNAL MEDICINE

## 2024-04-05 PROCEDURE — 99239 HOSP IP/OBS DSCHRG MGMT >30: CPT | Performed by: INTERNAL MEDICINE

## 2024-04-05 RX ORDER — CEPHALEXIN 500 MG/1
500 CAPSULE ORAL 3 TIMES DAILY
Qty: 15 CAPSULE | Refills: 0 | Status: SHIPPED | OUTPATIENT
Start: 2024-04-05 | End: 2024-04-10

## 2024-04-05 RX ORDER — PREDNISONE 10 MG/1
TABLET ORAL
Qty: 30 TABLET | Refills: 0 | Status: SHIPPED | OUTPATIENT
Start: 2024-04-06

## 2024-04-05 RX ORDER — PREDNISONE 20 MG/1
60 TABLET ORAL
Status: DISCONTINUED | OUTPATIENT
Start: 2024-04-06 | End: 2024-04-05

## 2024-04-05 RX ORDER — CEPHALEXIN 500 MG/1
500 CAPSULE ORAL 4 TIMES DAILY
Qty: 20 CAPSULE | Refills: 0 | Status: SHIPPED
Start: 2024-04-05 | End: 2024-04-05

## 2024-04-05 NOTE — PLAN OF CARE
Patient A & O x4, Upper sorbian speaking, family at bedside to aid with translation. Denies pain at this time. R arm elevated on pillows, sling applied for comfort, ice packs PRN. IV ancef. Voiding freely. Up standby with gait belt. Safety measures in place. Instructed to use call light.

## 2024-04-05 NOTE — PROGRESS NOTES
[facility]   part of [facility] ID PROGRESS NOTE    Benjy Randolph Patient Status:  Inpatient    1935 MRN [id number]   [facility] 3SW-A Attending Desiree Arreola MD   Hosp Day # 3 PCP Chyna Pascual DO     Abx: ancef d#3    Subjective: pt seen and examined. No fevers. Hand/ wrist much better. Still with shoulder pain    Medications:    Current Facility-Administered Medications:     methylPREDNISolone sodium succinate (Solu-MEDROL) injection 60 mg, 60 mg, Intravenous, Daily    HYDROmorphone (Dilaudid) 1 MG/ML injection 0.5 mg, 0.5 mg, Intravenous, Q30 Min PRN    amLODIPine (Norvasc) tab 10 mg, 10 mg, Oral, Nightly    lisinopril (Prinivil; Zestril) tab 20 mg, 20 mg, Oral, Nightly    rosuvastatin (Crestor) tab 5 mg, 5 mg, Oral, Nightly    carbidopa-levodopa (SINEMET)  MG per tab 1 tablet, 1 tablet, Oral, Nightly    enoxaparin (Lovenox) 40 MG/0.4ML SUBQ injection 40 mg, 40 mg, Subcutaneous, Nightly    melatonin tab 3 mg, 3 mg, Oral, Nightly PRN    ondansetron (Zofran) 4 MG/2ML injection 4 mg, 4 mg, Intravenous, Q6H PRN    ceFAZolin (Ancef) 2 g in 20mL IV syringe premix, 2 g, Intravenous, Q8H    morphINE PF 2 MG/ML injection 0.5 mg, 0.5 mg, Intravenous, Q2H PRN **OR** morphINE PF 2 MG/ML injection 1 mg, 1 mg, Intravenous, Q2H PRN **OR** morphINE PF 2 MG/ML injection 2 mg, 2 mg, Intravenous, Q2H PRN    acetaminophen-codeine (Tylenol #3) 300-30 MG per tab 1 tablet, 1 tablet, Oral, Q4H PRN    acetaminophen (Tylenol Extra Strength) tab 500 mg, 500 mg, Oral, Q6H PRN    Review of Systems:  Completed    Physical Exam:  Vital signs: Blood pressure 146/60, pulse 59, temperature 97.8 °F (36.6 °C), temperature source Oral, resp. rate 18, weight 201 lb (91.2 kg), SpO2 97%.    General: Alert, oriented, NAD, on room air.  HEENT: Moist mucous membranes.   Neck: No lymphadenopathy.  Supple.  Cardiovascular: RRR  Respiratory: Clear to auscultation bilaterally.  No wheezes. No  rhonchi.  Abdomen: Soft, nontender, nondistended.   Musculoskeletal: RUE with less edema and erythema. ROM of r wrist, hand and elbow are much better today  Integument: No rash    Laboratory Data:  Recent Labs   Lab 04/03/24  0426   RBC 4.27   HGB 13.2   HCT 39.4   MCV 92.3   MCH 30.9   MCHC 33.5   RDW 11.9   NEPRELIM 4.72   WBC 7.5   .0     Recent Labs   Lab 04/02/24  1345 04/03/24  0426   * 136*   BUN 19 18   CREATSERUM 0.90 0.95   CA 9.7 9.2   ALB 3.0*  --     137   K 3.7 4.4  4.4    105   CO2 28.0 29.0   ALKPHO 100  --    AST 19  --    ALT 12*  --    BILT 0.3  --    TP 7.8  --        Microbiology: Reviewed in EMR    Radiology: Reviewed.    PROCEDURE:  XR SHOULDER, COMPLETE (MIN 2 VIEWS), RIGHT (CPT=73030)     TECHNIQUE:  Multiple views were obtained.     COMPARISON:  None.     INDICATIONS:  R shoulder pain     PATIENT STATED HISTORY: (As transcribed by Technologist)  The patient offered no additional history at this point.     FINDINGS:       Marked loss of right glenohumeral joint space with subchondral sclerosis along with large right humeral head osteophyte.  Inferior glenoid osteophytes noted.     Subacromial osteophytes noted.     Moderate right acromioclavicular joint hypertrophic changes is noted.     Impression:    CONCLUSION:  Marked osteoarthritic changes of the right shoulder.  An acute fracture is not identified.     LOCATION:  [facility]     Dictated by (CST): Santos Mejia MD on 4/03/2024 at 11:06 AM      Finalized by (CST): Santos Mejia MD on 4/03/2024 at 11:07 AM         PROCEDURE:  US VENOUS DOPPLER ARM RIGHT - DIAG IMG (CPT=93971)     COMPARISON:  None.     INDICATIONS:  eval for DVT     TECHNIQUE:  Real time, grey scale, and duplex ultrasound was used to evaluate the upper extremity venous system. B-mode two-dimensional images of the vascular structures, Doppler spectral analysis, and color flow.  Doppler imaging were performed.  The  following veins were  imaged:  Subclavian, Jugular, Axillary, Brachial, Basilic, Cephalic, and the contralateral Subclavian and Jugular.     PATIENT STATED HISTORY: (As transcribed by Technologist)        FINDINGS:    EXTREMITY:  Right upper extremity  THROMBI:  None visible.  COMPRESSION:  Normal compressibility, phasicity, and augmentation of the subclavian, jugular, axillary, brachial, basilic, and cephalic veins.  OTHER:  Negative.    Impression:    CONCLUSION:  No evidence of DVT in the right upper extremity.     LOCATION:  [place] [address]     Dictated by (CST): Santos Mejia MD on 4/02/2024 at 2:38 PM      Finalized by (CST): Santos Mejia MD on 4/02/2024 at 2:38 PM      ASSESSMENT:  RUE erythema and edema, ? Cellulitis vs gout (h/o gout) or other type of inflam arthritis.   Venous US negative for DVT; recent PICC to RUE.   Much better with steroids/ NSAIDs  Left elbow septic arthritis  Synovial fluid 3/15 with WBC 78,760, 93 neutr. Cx negative.  Completed course of IV vanco/CTX 3/28  Right shoulder OA  History of Parkinson's Disease  Aortic stenosis  HTN, HLD  H/o colon cancer s/p resection    PLAN:  - ok for home from my standpoint on keflef for 5 more days  - cont arm elevation  - steroids per rheum  - still with some shoulder pain, but admits this has been going on for longer. Suspect may have a more chronic injury- will f/u with ortho and rheum    Discussed case with pt and daughter. Ok for dc. Can f/u with me as needed    Lyudmila Stringer MD

## 2024-04-05 NOTE — PROGRESS NOTES
[facility]   part of [facility]     Hospitalist Progress Note     Benjy Randolph Patient Status:  Inpatient    1935 MRN [id number]   Location [facility] 3SW-A Attending Desiree Arreola MD   Hosp Day # 3 PCP Chyna Pascual DO     Chief Complaint: Right hand redness and swelling and pain, right shoulder pain with inability to lift his right arm up.    Subjective:     Patient sitting up in the chair.  Patient seen with patient's daughter at bedside who is helping to translate Bhutanese for patient.  Denies any chest pain or shortness of breath.  Right arm pain improving . right shoulder pain improving since admission.  Patient remains afebrile.  Denies abdominal pain.  No nausea vomiting.  Tolerating diet.  Able to raise the right arm up 45 degrees today and the pain started again.  On room air.  Patient afebrile.  Eager to go home today.      Objective:    Review of Systems:   A comprehensive review of systems was completed; pertinent positive and negatives stated in subjective.    Vital signs:  Temp:  [97.1 °F (36.2 °C)-98.6 °F (37 °C)] 98.6 °F (37 °C)  Pulse:  [53-62] 58  Resp:  [16-20] 20  BP: (120-149)/(48-60) 149/55  SpO2:  [93 %-97 %] 95 %    Physical Exam:    /55 (BP Location: Right arm)   Pulse 58   Temp 98.6 °F (37 °C) (Oral)   Resp 20   Wt 201 lb (91.2 kg)   SpO2 95%   BMI 32.44 kg/m²   On room air  General: No acute distress.   HEENT: Moist mucous membranes.  Respiratory: Clear to auscultation bilaterally.  No wheezes. No rhonchi.  Cardiovascular: S1, S2.  Regular rate and rhythm.    Abdomen: Soft, nontender, nondistended.  Positive bowel sounds.   Neurologic: Awake alert, no focal neurological deficits.  Musculoskeletal: Right shoulder tenderness and stiffness and has right frozen shoulder.  Right upper extremity erythema and swelling and tenderness to palpation continuing to improve  Psychiatric: Appropriate mood and affect.       Diagnostic Data:     Labs:  Recent Labs   Lab 04/02/24  1345 04/03/24  0426   WBC 9.7 7.5   HGB 13.7 13.2   MCV 90.9 92.3   .0 226.0       Recent Labs   Lab 04/02/24  1345 04/03/24  0426   * 136*   BUN 19 18   CREATSERUM 0.90 0.95   CA 9.7 9.2   ALB 3.0*  --     137   K 3.7 4.4  4.4    105   CO2 28.0 29.0   ALKPHO 100  --    AST 19  --    ALT 12*  --    BILT 0.3  --    TP 7.8  --        Estimated Creatinine Clearance: 47.6 mL/min (based on SCr of 0.95 mg/dL).      Microbiology    Hospital Encounter on 04/02/24   1. Blood Culture     Status: None (Preliminary result)    Collection Time: 04/02/24  1:47 PM    Specimen: Blood,peripheral   Result Value Ref Range    Blood Culture Result No Growth 2 Days N/A         Imaging: Reviewed in Epic.    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  Received 2 doses already  Rheumatoid factor less than 10  ESR/sed rate improving to 72 today from 85 yesterday.  Still elevated  CRP elevated,  improving from yesterday.  Was 8.56926 2024, 5.98 on 4/5/2024.  #Hypertension  #History of prior chronic diastolic heart failure  Continue home medications including Norvasc and lisinopril  Does not take any Lasix at home as reported by patient's daughter and granddaughter  Follow blood pressure  Echo done during recent hospital stay at [facility] during recent admission in March 2024 reportedly showed TTE: LVEF 63%. Stage 1 DD. Mild-moderate AVS. No significant other valvular abnormality.  On care everywhere chart review  #Hyperlipidemia  Statin  #Parkinson's disease  Continue home carbidopa levodopa.  Patient's daughter and granddaughter reported that patient self discontinued his carbidopa levodopa when he went to visit [place]  PT OT  #History of gout  Uric acid level normal   Will have rheumatology on consult  #History of colon cancer with prior colectomy and prior chemo  #Osteoarthritis     Discussed with patient, patient's daughter at bedside.  Discussed with RN.  Discharge planning once cleared by ID, Ortho and rheumatology; antibiotics at the time of discharge per ID.  Follow-up with ID Ortho and rheumatology as outpatient as directed.  Follow-up with regular outpatient primary care physician Chyna Pascual DO within 1 week in office.    Desiree Arreola MD    Supplementary Documentation:     Quality:  DVT Mechanical Prophylaxis:   SCDs,    DVT Pharmacologic Prophylaxis   Medication    enoxaparin (Lovenox) 40 MG/0.4ML SUBQ injection 40 mg                Code Status: Full Code  Prasad: No urinary catheter in place  Prasad Duration (in days):   Central line:    BRYANNA: 4/5/2024    Discharge is dependent on: Clinical progress  At this point Mr. Shankar Randolph is expected to be discharge to: Home with 24-hour supervision from family    The 21st Century Cures Act makes medical notes like these available to patients in the interest of transparency. Please be advised this is a  medical document. Medical documents are intended to carry relevant information, facts as evident, and the clinical opinion of the practitioner. The medical note is intended as peer to peer communication and may appear blunt or direct. It is written in medical language and may contain abbreviations or verbiage that are unfamiliar.             **Certification      PHYSICIAN Certification of Need for Inpatient Hospitalization - Initial Certification    Patient will require inpatient services that will reasonably be expected to span two midnight's based on the clinical documentation in H+P.   Based on patients current state of illness, I anticipate that, after discharge, patient will require TBD.

## 2024-04-05 NOTE — DISCHARGE INSTRUCTIONS
- Use sling to right arm as needed/as tolerated  - Follow up with Infectious Disease as needed--> 5 more days of oral antibiotic       From rheumatology-   -- take prednisone taper as ordered  -- repeat labs in 2 weeks as outpatient  -- follow up with ortho regarding frozen shoulder  -- we will be in communication with test results when available  Dr. Heather Ortiz, DO  [facility]  4/5/2024

## 2024-04-05 NOTE — PROGRESS NOTES
[facility]  [facility] Progress Note  Benjy Randolph Patient Status:  Inpatient    1935 MRN [id number]   Location [facility] 3SW-A Attending Desiree Arreola MD   Hosp Day # 3 PCP Chyna Pascual DO     Subjective:  Benjy Randolph is a(n) 89 year old male.    Current complaints:   Pt seen and examined this afternoon.  Family at bedside concerned about pt going home today but per RN family earlier eager to take pt home.  Pt still with significant restriction in the right shoulder with pain. Denies other joint pain. Feels hand swelling stable no better/no worse.  I translated Divehi myself.     Objective:/55 (BP Location: Right arm)   Pulse 58   Temp 98.6 °F (37 °C) (Oral)   Resp 20   Wt 201 lb (91.2 kg)   SpO2 95%   BMI 32.44 kg/m²    General: Alert and oriented in no apparent distress.  HEENT: No focal deficits.  Cardiac: Regular rate and rhythm, S1, S2 normal, + holosystolic murmur heard at all posts.   Lungs: Clear without wheezes, rales, rhonchi or dullness.  Normal excursions and effort.  Abdomen: Soft, non-tender. .  Neurologic: Alert and oriented, normal affect. Moves all four extremities   Skin: Warm and dry.  Slight redness over right hand  MSK: Right shoulder significant restriction in all fields with tenderness. Rigth elbow without tenderness but cannot fully extend d/t shoulder pain. Right hand swollen- dorsal hand and fingers diffusely. - slightly improved but still swollen Remainder of exam grossly normal- no tenderness/swelling over left dips, pips, mcps, wrist, elbow, shoulder, b/l knees, ankles or joints of feet     Labs:  Recent Labs     24  0426 24  1446 24  0458   WBC 7.5  --   --    HGB 13.2  --   --    HCT 39.4  --   --    .0  --   --    CREATSERUM 0.95  --   --    BUN 18  --   --      --   --    K 4.4  4.4  --   --      --   --    CO2 29.0  --   --    *  --   --    CA 9.2  --   --    CRP  --    <  > 5.98*    < > = values in this interval not displayed.       Assessment:  Inflammatory polyarthritis  Right shoulder adhesive capsulitis   Cellulitis right UE  Recent elbow septic arthritis vs septic bursitis s/p IV abx  HTN  HLD  Parkinson's  ?hx of gout per chart but denied by pt and pt's daughter          Patient Active Problem List   Diagnosis    Parkinson disease (HCC)    Essential hypertension, benign    Heart murmur    Bilateral lower extremity edema    Right shoulder pain    Glenohumeral arthritis - right    Asymptomatic LV dysfunction    Aortic valve sclerosis    Hypercholesterolemia    Hyperglycemia    LVH (left ventricular hypertrophy)    Left atrial dilation    Mitral valve sclerosis    Chronic diastolic heart failure (HCC)    Primary osteoarthritis of first carpometacarpal joint of right hand    Ground glass opacity present on imaging of lung    Benign prostatic hyperplasia    Prediabetes    Retrolisthesis    History of skin cancer    Advanced age    Frail elderly    Lumbar back pain with radiculopathy affecting right lower extremity    Difficulty walking    Neoplasm of uncertain behavior of skin    Alcohol use disorder, moderate, in sustained remission (HCC)    Small vessel disease (HCC)    Abdominal hernia without obstruction and without gangrene    Hypoalbuminemia due to protein-calorie malnutrition (HCC)    Atherosclerosis of abdominal aorta (HCC)    Calculus of gallbladder without cholecystitis without obstruction    Uncontrolled hypertension    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 Benjy Randolph is a 88 yo man with hx of colon cancer and skin cancer as well as HTN, HLD and OA who was recenty dx of right elbow bursitis, septic arthritis and cellulitis s/p prolonged IV abx. He presents with  acute on chronic right shoulder pain with severe limitation as well as right hand swelling. He lacks other obvious signs/symptoms of inflammatory arthritis but does not seem to be the best historian. Will work up for possible PMR vs RA for completeness sake. Unclear if he has hx of gout, recent uric acid levels were well below 6.9, although not the most dependable during acute attack. Will treat as if nonspecific inflammatory arthritis and do trial of IV methylpred and monitor response to determine oral pred taper. No ULT indicated at this time  Spoke to other family members at bedside and on phone- states pt had gout of hand and ankle in the past- treated with steroids and improved after several days. No hx of allopurinol or other ULT.      Recommendations:   -- discussed case with carrie HYDE with steroids  -- IV methylpred 60mg daily yesterday and today- some improvement of right shoulder ROM. Right hand still swollen but slightly better.   -- ESR/CRP came back quite elevated compared to previously but slightly improved after one dose of steroids   -- may benefit from intra-articular steroid injection in the future for adhesive capsulitis defer to ortho  -- RF negative. CCP pending  -- transition to PO steroids tomorrow with slow taper  -- will need repeat labs in 2 weeks as outpatient to monitor ESR, CRP and sUA.     Discussed with multiple family members  Deferred discharge planning to primary service  Discussed with RN, Toma.      Heather Ortiz,   [facility]  4/5/2024

## 2024-04-05 NOTE — CM/SW NOTE
Spoke with RN who stated pt is ready to DC. Spoke with pt's granddaughter, Lizbeth Chaparro, who confirmed that pt will be discharging home to the address listed on his facesheet. Family would like to resume services with [facility] for RN, PT, and CNA. Informed Lizbeth that the order was placed with [facility] for RN, PT, and CNA. / to remain available for support and/or discharge planning.    Spoke with Shaina from [facility] and informed her that pt will be discharging today and will be ready to resume services with them.    [facility]  P:[phone]  F:[phone]    ALANA Perdue  Discharge Planner  [phone]

## 2024-04-05 NOTE — PROGRESS NOTES
[facility]   part of [facility] ID PROGRESS NOTE    Benjy Randolph Patient Status:  Inpatient    1935 MRN [id number]   [facility] 3SW-A Attending Desiree Arreola MD   Hosp Day # 2 PCP Chyna Pascual DO     Abx: ancef    Subjective: pt seen and examined. Better today. Less arm pain and swelling    Medications:    Current Facility-Administered Medications:     methylPREDNISolone sodium succinate (Solu-MEDROL) injection 60 mg, 60 mg, Intravenous, Daily    HYDROmorphone (Dilaudid) 1 MG/ML injection 0.5 mg, 0.5 mg, Intravenous, Q30 Min PRN    amLODIPine (Norvasc) tab 10 mg, 10 mg, Oral, Nightly    lisinopril (Prinivil; Zestril) tab 20 mg, 20 mg, Oral, Nightly    rosuvastatin (Crestor) tab 5 mg, 5 mg, Oral, Nightly    carbidopa-levodopa (SINEMET)  MG per tab 1 tablet, 1 tablet, Oral, Nightly    enoxaparin (Lovenox) 40 MG/0.4ML SUBQ injection 40 mg, 40 mg, Subcutaneous, Nightly    melatonin tab 3 mg, 3 mg, Oral, Nightly PRN    ondansetron (Zofran) 4 MG/2ML injection 4 mg, 4 mg, Intravenous, Q6H PRN    ceFAZolin (Ancef) 2 g in 20mL IV syringe premix, 2 g, Intravenous, Q8H    morphINE PF 2 MG/ML injection 0.5 mg, 0.5 mg, Intravenous, Q2H PRN **OR** morphINE PF 2 MG/ML injection 1 mg, 1 mg, Intravenous, Q2H PRN **OR** morphINE PF 2 MG/ML injection 2 mg, 2 mg, Intravenous, Q2H PRN    acetaminophen-codeine (Tylenol #3) 300-30 MG per tab 1 tablet, 1 tablet, Oral, Q4H PRN    acetaminophen (Tylenol Extra Strength) tab 500 mg, 500 mg, Oral, Q6H PRN    Review of Systems:  Completed    Physical Exam:  Vital signs: Blood pressure 120/48, pulse 53, temperature 97.7 °F (36.5 °C), temperature source Oral, resp. rate 16, weight 201 lb (91.2 kg), SpO2 93%.    General: Alert, oriented, NAD, on room air.  HEENT: Moist mucous membranes.   Neck: No lymphadenopathy.  Supple.  Cardiovascular: RRR  Respiratory: Clear to auscultation bilaterally.  No wheezes. No rhonchi.  Abdomen: Soft,  nontender, nondistended.   Musculoskeletal: RUE with less edema and erythema. ROM of r wrist, hand and elbow are much better today  Integument: No rash    Laboratory Data:  Recent Labs   Lab 04/03/24  0426   RBC 4.27   HGB 13.2   HCT 39.4   MCV 92.3   MCH 30.9   MCHC 33.5   RDW 11.9   NEPRELIM 4.72   WBC 7.5   .0     Recent Labs   Lab 04/02/24  1345 04/03/24  0426   * 136*   BUN 19 18   CREATSERUM 0.90 0.95   CA 9.7 9.2   ALB 3.0*  --     137   K 3.7 4.4  4.4    105   CO2 28.0 29.0   ALKPHO 100  --    AST 19  --    ALT 12*  --    BILT 0.3  --    TP 7.8  --        Microbiology: Reviewed in EMR    Radiology: Reviewed.    PROCEDURE:  XR SHOULDER, COMPLETE (MIN 2 VIEWS), RIGHT (CPT=73030)     TECHNIQUE:  Multiple views were obtained.     COMPARISON:  None.     INDICATIONS:  R shoulder pain     PATIENT STATED HISTORY: (As transcribed by Technologist)  The patient offered no additional history at this point.     FINDINGS:       Marked loss of right glenohumeral joint space with subchondral sclerosis along with large right humeral head osteophyte.  Inferior glenoid osteophytes noted.     Subacromial osteophytes noted.     Moderate right acromioclavicular joint hypertrophic changes is noted.     Impression:    CONCLUSION:  Marked osteoarthritic changes of the right shoulder.  An acute fracture is not identified.     LOCATION:  [facility]     Dictated by (CST): Santos Mejia MD on 4/03/2024 at 11:06 AM      Finalized by (CST): Santos Mejia MD on 4/03/2024 at 11:07 AM         PROCEDURE:  US VENOUS DOPPLER ARM RIGHT - DIAG IMG (CPT=93971)     COMPARISON:  None.     INDICATIONS:  eval for DVT     TECHNIQUE:  Real time, grey scale, and duplex ultrasound was used to evaluate the upper extremity venous system. B-mode two-dimensional images of the vascular structures, Doppler spectral analysis, and color flow.  Doppler imaging were performed.  The  following veins were imaged:  Subclavian, Jugular,  Axillary, Brachial, Basilic, Cephalic, and the contralateral Subclavian and Jugular.     PATIENT STATED HISTORY: (As transcribed by Technologist)        FINDINGS:    EXTREMITY:  Right upper extremity  THROMBI:  None visible.  COMPRESSION:  Normal compressibility, phasicity, and augmentation of the subclavian, jugular, axillary, brachial, basilic, and cephalic veins.  OTHER:  Negative.    Impression:    CONCLUSION:  No evidence of DVT in the right upper extremity.     LOCATION:  [place] [address]     Dictated by (CST): Santos Mejia MD on 4/02/2024 at 2:38 PM      Finalized by (CST): Santos Mejia MD on 4/02/2024 at 2:38 PM      ASSESSMENT:  RUE erythema and edema, ? Cellulitis vs gout (h/o gout) or other type of inflam arthritis.   Venous US negative for DVT; recent PICC to RUE.   Left elbow septic arthritis  Synovial fluid 3/15 with WBC 78,760, 93 neutr. Cx negative.  Completed course of IV vanco/CTX 3/28  Right shoulder OA  History of Parkinson's Disease  Aortic stenosis  HTN, HLD  H/o colon cancer s/p resection    PLAN:  - continue IV Ancef for now but suspect main issue is arthritis (some form of inflammatory arthritis). Seemed MUCH better today after a few doses of NSAIDs  - appreciate rheum eval. Ok for steroids from my standpoint  - follow blood cultures  - follow temps  - follow R arm clinically    Discussed case with pt and daughter. D/w Dr Ortiz.    Lyudmila Stringer MD

## 2024-04-05 NOTE — PROGRESS NOTES
NURSING DISCHARGE NOTE    Discharged Home via Wheelchair.  Accompanied by Family member and Support staff  Belongings Taken by patient/family.    IV removed. Discharge instructions provided. Spoke with Russel over the phone. Agreed with discharge plan.

## 2024-04-05 NOTE — PLAN OF CARE
Patient alert and oriented x4. VSS, on RA when awake 2L O2 at NOC when sleeping. Tele in place; NSR/SB. No reports of pain this AM. RUE slightly weaker than LUE. Pt intermittently using sling to RUE. Up x1 person SBA, voiding freely. Pt denies N/V.     Plan: await clearance for discharge, IV abx per orders.

## 2024-04-07 NOTE — DISCHARGE SUMMARY
[facility]  Discharge Summary    Benjy Randolph Patient Status:  Inpatient    1935 MRN [id number]   Location [facility] 3SW-A Attending No att. 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  # Recent left elbow bursitis, with questionable septic arthritis status post IV antibiotics and was seen by ID and Ortho while at [facility] per care everywhere chart review  #Hypertension  #History of prior chronic diastolic heart failure  #Hyperlipidemia  #Parkinson's disease  #History of gout  #History of colon cancer with prior colectomy and prior chemo  #Osteoarthritis       Chief Complaint:   Chief Complaint   Patient presents with    Swelling Edema       History of Present Illness:   Benjy Randolph is a 89 year old male admitted with Right hand redness and swelling and pain, right shoulder pain with inability to lift his right arm up.  Symptoms started 2 days back according to patient and patient's daughter at bedside and also patient's granddaughter over phone who is translating Polish for patient per patient's wishes.  Patient was recently admitted to the hospital at [facility] according to patient's granddaughter.  According to patient's granddaughter patient was diagnosed with left elbow bursitis which was felt to be infected and had a PICC line in his right arm and was on IV antibiotics with IV Rocephin until this Friday, at that time PICC line was discontinued on this Friday according to patient's daughter and granddaughter.  Patient's left elbow improved.  2 days back started to have worsening pain and redness and swelling and stiffness on right arm, has right shoulder pain and  unable to lift the right shoulder up according to patient and patient's daughter and granddaughter.  Also had previous history of gout according to patient's daughter and granddaughter.  Patient afebrile.  Denies any chest pain or shortness of breath.  Denies any nausea vomiting.  Denies any abdominal pain.  Denies any constipation or diarrhea.     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  # Recent left elbow bursitis, with questionable septic arthritis status post IV antibiotics and was seen by ID and Ortho while at [facility] per care everywhere chart review  Treated with IV antibiotics  Seen by ID and Ortho on consult  Seen by PT OT eval  Uric acid level normal  Blood culture sent on admission with no growth so far  On care everywhere chart review, left elbow synovial fluid aspirate culture done on 3/15/2024 at [facility] had no growth  Given Toradol every 6 hours for pain  Seen by rheumatology consult , status post IV steroids per rheumatology with good response.  Received 2 doses already  Rheumatoid factor less than 10  ESR/sed rate improving to 72 today from 85 yesterday.  Still elevated  CRP elevated, improving from yesterday.  Was 8.95 on admission, 5.98 on 4/5/2024.  #Hypertension  #History of prior chronic diastolic heart failure  Continue home medications including Norvasc and lisinopril  Does not take any Lasix at home as reported by patient's daughter and granddaughter  Blood pressure controlled  Echo done during recent hospital stay at [facility] during recent admission in March 2024 reportedly showed TTE: LVEF 63%. Stage 1 DD. Mild-moderate AVS. No significant other valvular abnormality.  On care everywhere chart review  #Hyperlipidemia  Statin  #Parkinson's disease  Continued home carbidopa levodopa.  Patient's daughter and  granddaughter reported that patient self discontinued his carbidopa levodopa when he went to visit [place]  PT OT  #History of gout  Uric acid level normal       Seen by rheumatology on consult  #History of colon cancer with prior colectomy and prior chemo  #Osteoarthritis     Patient improving clinically.  Cleared by ID, Ortho and rheumatology for discharge today; antibiotics at the time of discharge per ID.  Follow-up with ID Ortho and rheumatology as outpatient as directed.  Follow-up with regular outpatient primary care physician Chyna Pascual DO within 1 week in office        TCM Diagnosis at discharge from Hospital: Other: See above; still recommend for TCM follow-up    Lace+ Score: 70  59-90 High Risk  29-58 Medium Risk  0-28   Low Risk.     TCM Follow-Up Recommendation:Benjy Randolph   LACE > 58: High Risk of readmission after discharge from the hospital.      PCP: Chyna Pascual DO    Consultations:   Consultants         Provider   Role Specialty     Heather Ortiz DO   Consulting Physician RHEUMATOLOGY     Tata Zhang MD   Consulting Physician Sports Medicine     Lyudmila Stringer MD   Consulting Physician INFECTIOUS DISEASES         Procedures during hospitalization:   None    Incidental or significant findings and recommendations (brief descriptions):  None    Lab/Test results pending at Discharge:   None      Discharge Medications:        Discharge Medications        START taking these medications        Instructions Prescription details   cephalexin 500 MG Caps  Commonly known as: Keflex  Notes to patient: Take with food      Take 1 capsule (500 mg total) by mouth 3 (three) times daily for 5 days.   Stop taking on: April 10, 2024  Quantity: 15 capsule  Refills: 0     predniSONE 10 MG Tabs  Commonly known as: Deltasone  Notes to patient: Follow listed instructions      Take 40mg daily x 3 days, then 30mg daily x 3 days, then 20mg daily x 3 days, then 10mg daily x 3 days then stop    Quantity: 30 tablet  Refills: 0            CONTINUE taking these medications        Instructions Prescription details   acetaminophen 325 MG Tabs  Commonly known as: Tylenol      Take 1 tablet (325 mg total) by mouth every 6 (six) hours as needed for Pain.   Refills: 0     amLODIPine 10 MG Tabs  Commonly known as: Norvasc      Take 1 tablet (10 mg total) by mouth at bedtime.   Quantity: 90 tablet  Refills: 0     carbidopa-levodopa  MG Tabs  Commonly known as: SINEMET      TAKE 1 TABLET BY MOUTH FOUR TIMES DAILY(7AM, 11AM, 3PM, 7PM)   Quantity: 120 tablet  Refills: 2     lisinopril 20 MG Tabs  Commonly known as: Prinivil; Zestril      Take 1 tablet (20 mg total) by mouth at bedtime.   Quantity: 90 tablet  Refills: 0     rosuvastatin 5 MG Tabs  Commonly known as: Crestor      Take 1 tablet (5 mg total) by mouth nightly.   Quantity: 90 tablet  Refills: 0               Where to Get Your Medications        These medications were sent to [id number] [place] - [address] AT [facility] [address], [phone], [phone]  [address], [facility] [address]      Hours: 24-hours Phone: [phone]   cephalexin 500 MG Caps  predniSONE 10 MG Tabs          [place] monitoring program data reviewed in epic before prescribing narcotics/controlled substances: Not applicable as no narcotics was 5    Follow up Visits: Follow-up with Chyna Pascual DO in 1 week    Chyna Pascual DO  [address] [facility] [address]  [facility] [id number] [phone]    Schedule an appointment as soon as possible for a visit in 1 week(s)      Tata Zhang MD  [address] [facility] [address]  [facility] [address]  [phone]    Schedule an appointment as soon as possible for a visit  As needed    Lyudmila Stringer MD  [address]   [address]  [facility] [address]  [phone]    Follow up  As needed    Heather Ortiz DO  [address] [facility] [address]  [facility] [address]  [phone]    Call in 2  week(s)      Appointments for Next 30 Days 4/6/2024 - 5/6/2024        Date Arrival Time Visit Type Length Department Provider     4/9/2024  2:00 PM  NON-[facility] HOSPITAL FOLLOW UP [5060] 60 min [facility] Shyanne Corea APRN    Patient Instructions:         Location Instructions:     Masks are optional for all patients and visitors, unless otherwise indicated.                      Physical Exam:  /55 (BP Location: Right arm)   Pulse 58   Temp 98.6 °F (37 °C) (Oral)   Resp 20   Wt 201 lb (91.2 kg)   SpO2 95%   BMI 32.44 kg/m²     On room air  General: No acute distress.   HEENT: Moist mucous membranes.  Respiratory: Clear to auscultation bilaterally.  No wheezes. No rhonchi.  Cardiovascular: S1, S2.  Regular rate and rhythm.    Abdomen: Soft, nontender, nondistended.  Positive bowel sounds.   Neurologic: Awake alert, no focal neurological deficits.  Musculoskeletal: Right shoulder tenderness and stiffness and has right frozen shoulder.  Right upper extremity erythema and swelling and tenderness to palpation continuing to improve  Psychiatric: Appropriate mood and affect.         Discharge Condition: stable    Patient Discharge Instructions: See electronic chart      More than 30 minutes on discharge    Desiree Arreola MD

## 2024-04-08 ENCOUNTER — PATIENT OUTREACH (OUTPATIENT)
Dept: CASE MANAGEMENT | Age: 89
End: 2024-04-08

## 2024-04-08 DIAGNOSIS — Z02.9 ENCOUNTERS FOR UNSPECIFIED ADMINISTRATIVE PURPOSE: Primary | ICD-10-CM

## 2024-04-08 LAB — CCP IGG SERPL-ACNC: 1.8 U/ML (ref 0–6.9)

## 2024-04-08 PROCEDURE — 1111F DSCHRG MED/CURRENT MED MERGE: CPT

## 2024-04-08 NOTE — PAYOR COMM NOTE
--------------  DISCHARGE REVIEW    Payor: [employer]  Subscriber #:  [id number]  Authorization Number: [id number]    Admit date: 24  Admit time:   4:54 PM  Discharge Date: 2024  5:00 PM     Admitting Physician: Desiree Arreola MD  Attending Physician:  No att. providers found  Primary Care Physician: Chyna Pascual DO          Discharge Summary Notes        Discharge Summary signed by Desiree Arreola MD at 2024 10:58 PM       Author: Desiree Arreola MD Specialty: HOSPITALIST, Internal Medicine Author Type: Physician    Filed: 2024 10:58 PM Date of Service: 2024 10:50 PM Status: Signed    : Desiree Arreola MD (Physician)         [facility]  Discharge Summary    Benjy Randolph Patient Status:  Inpatient    1935 MRN [id number]   Location [facility] 3SW-A Attending No att. 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  # Recent left elbow bursitis, with questionable septic arthritis status post IV antibiotics and was seen by ID and Ortho while at [facility] per care everywhere chart review  #Hypertension  #History of prior chronic diastolic heart failure  #Hyperlipidemia  #Parkinson's disease  #History of gout  #History of colon cancer with prior colectomy and prior chemo  #Osteoarthritis       Chief Complaint:   Chief Complaint   Patient presents with    Swelling Edema       History of Present Illness:   Benjy Randolph is a 89 year old male admitted with Right hand redness and swelling and pain, right shoulder pain with inability to lift his right arm up.  Symptoms started 2 days back according to patient and patient's daughter at bedside and also patient's granddaughter over phone who  is translating Syriac for patient per patient's wishes.  Patient was recently admitted to the hospital at [facility] according to patient's granddaughter.  According to patient's granddaughter patient was diagnosed with left elbow bursitis which was felt to be infected and had a PICC line in his right arm and was on IV antibiotics with IV Rocephin until this Friday, at that time PICC line was discontinued on this Friday according to patient's daughter and granddaughter.  Patient's left elbow improved.  2 days back started to have worsening pain and redness and swelling and stiffness on right arm, has right shoulder pain and unable to lift the right shoulder up according to patient and patient's daughter and granddaughter.  Also had previous history of gout according to patient's daughter and granddaughter.  Patient afebrile.  Denies any chest pain or shortness of breath.  Denies any nausea vomiting.  Denies any abdominal pain.  Denies any constipation or diarrhea.     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 per care everywhere chart review  Treated with IV antibiotics  Seen by ID and Ortho on consult  Seen by PT OT eval  Uric acid level normal  Blood culture sent on admission with no growth so far  On care everywhere chart review, left elbow synovial fluid aspirate culture done on 3/15/2024 at [facility] had no growth  Given Toradol every 6 hours for pain  Seen by rheumatology consult , status post IV steroids per rheumatology with good response.  Received 2 doses already  Rheumatoid factor less than 10  ESR/sed rate improving to 72 today from 85 yesterday.  Still elevated  CRP elevated,  improving from yesterday.  Was 8.95 on admission, 5.98 on 4/5/2024.  #Hypertension  #History of prior chronic diastolic heart failure  Continue home medications including Norvasc and lisinopril  Does not take any Lasix at home as reported by patient's daughter and granddaughter  Blood pressure controlled  Echo done during recent hospital stay at [facility] during recent admission in March 2024 reportedly showed TTE: LVEF 63%. Stage 1 DD. Mild-moderate AVS. No significant other valvular abnormality.  On care everywhere chart review  #Hyperlipidemia  Statin  #Parkinson's disease  Continued home carbidopa levodopa.  Patient's daughter and granddaughter reported that patient self discontinued his carbidopa levodopa when he went to visit [place]  PT OT  #History of gout  Uric acid level normal       Seen by rheumatology on consult  #History of colon cancer with prior colectomy and prior chemo  #Osteoarthritis     Patient improving clinically.  Cleared by ID, Ortho and rheumatology for discharge today; antibiotics at the time of discharge per ID.  Follow-up with ID Ortho and rheumatology as outpatient as directed.  Follow-up with regular outpatient primary care physician Chyna Pascual DO within 1 week in office        TCM Diagnosis at discharge from Hospital: Other: See above; still recommend for TCM follow-up    Lace+ Score: 70  59-90 High Risk  29-58 Medium Risk  0-28   Low Risk.     TCM Follow-Up Recommendation:Benjy Randolph   LACE > 58: High Risk of readmission after discharge from the hospital.      PCP: Chyna Pascual DO    Consultations:   Consultants         Provider   Role Specialty     Heather Ortiz DO   Consulting Physician RHEUMATOLOGY     Tata Zhang MD   Consulting Physician Sports Medicine     Lyudmila Stringer MD   Consulting Physician INFECTIOUS DISEASES         Procedures during hospitalization:   None    Incidental or significant findings and  recommendations (brief descriptions):  None    Lab/Test results pending at Discharge:   None      Discharge Medications:        Discharge Medications        START taking these medications        Instructions Prescription details   cephalexin 500 MG Caps  Commonly known as: Keflex  Notes to patient: Take with food      Take 1 capsule (500 mg total) by mouth 3 (three) times daily for 5 days.   Stop taking on: April 10, 2024  Quantity: 15 capsule  Refills: 0     predniSONE 10 MG Tabs  Commonly known as: Deltasone  Notes to patient: Follow listed instructions      Take 40mg daily x 3 days, then 30mg daily x 3 days, then 20mg daily x 3 days, then 10mg daily x 3 days then stop   Quantity: 30 tablet  Refills: 0            CONTINUE taking these medications        Instructions Prescription details   acetaminophen 325 MG Tabs  Commonly known as: Tylenol      Take 1 tablet (325 mg total) by mouth every 6 (six) hours as needed for Pain.   Refills: 0     amLODIPine 10 MG Tabs  Commonly known as: Norvasc      Take 1 tablet (10 mg total) by mouth at bedtime.   Quantity: 90 tablet  Refills: 0     carbidopa-levodopa  MG Tabs  Commonly known as: SINEMET      TAKE 1 TABLET BY MOUTH FOUR TIMES DAILY(7AM, 11AM, 3PM, 7PM)   Quantity: 120 tablet  Refills: 2     lisinopril 20 MG Tabs  Commonly known as: Prinivil; Zestril      Take 1 tablet (20 mg total) by mouth at bedtime.   Quantity: 90 tablet  Refills: 0     rosuvastatin 5 MG Tabs  Commonly known as: Crestor      Take 1 tablet (5 mg total) by mouth nightly.   Quantity: 90 tablet  Refills: 0               Where to Get Your Medications        These medications were sent to [id number] - [place] - [address] AT [facility] [address], [phone], [phone]  [address], [place] [address]      Hours: 24-hours Phone: [phone]   cephalexin 500 MG Caps  predniSONE 10 MG Tabs          [place] monitoring program data reviewed in  epic before prescribing narcotics/controlled substances: Not applicable as no narcotics was 5    Follow up Visits: Follow-up with Chyna Pascual DO in 1 week    Chyna Pascual DO  [address]  [facility] [address]  [phone]    Schedule an appointment as soon as possible for a visit in 1 week(s)      Tata Zhang MD  [address]  [facility] [address]  [phone]    Schedule an appointment as soon as possible for a visit  As needed    Lyudmila Stringer MD  [address]  [address]  [facility] [address]  [phone]    Follow up  As needed    Heather Ortiz DO  [address]  [facility] [address]  [phone]    Call in 2 week(s)      Appointments for Next 30 Days 4/6/2024 - 5/6/2024        Date Arrival Time Visit Type Length Department Provider     4/9/2024  2:00 PM  NON-[facility] HOSPITAL FOLLOW UP [5060] 60 min [facility] Shyanne Corea APRN    Patient Instructions:         Location Instructions:     Masks are optional for all patients and visitors, unless otherwise indicated.                      Physical Exam:  /55 (BP Location: Right arm)   Pulse 58   Temp 98.6 °F (37 °C) (Oral)   Resp 20   Wt 201 lb (91.2 kg)   SpO2 95%   BMI 32.44 kg/m²     On room air  General: No acute distress.   HEENT: Moist mucous membranes.  Respiratory: Clear to auscultation bilaterally.  No wheezes. No rhonchi.  Cardiovascular: S1, S2.  Regular rate and rhythm.    Abdomen: Soft, nontender, nondistended.  Positive bowel sounds.   Neurologic: Awake alert, no focal neurological deficits.  Musculoskeletal: Right shoulder tenderness and stiffness and has right frozen shoulder.  Right upper extremity erythema and swelling and tenderness to palpation continuing to improve  Psychiatric: Appropriate mood and affect.         Discharge Condition: stable    Patient Discharge Instructions: See electronic chart      More than 30 minutes on discharge    Desiree Arreola MD        Electronically  signed by Desiree Arreola MD on 4/6/2024 10:58 PM         REVIEWER COMMENTS

## 2024-04-09 ENCOUNTER — OFFICE VISIT (OUTPATIENT)
Dept: INTERNAL MEDICINE CLINIC | Facility: CLINIC | Age: 89
End: 2024-04-09
Payer: COMMERCIAL

## 2024-04-09 ENCOUNTER — TELEPHONE (OUTPATIENT)
Dept: ORTHOPEDICS CLINIC | Facility: CLINIC | Age: 89
End: 2024-04-09

## 2024-04-09 VITALS
SYSTOLIC BLOOD PRESSURE: 150 MMHG | RESPIRATION RATE: 18 BRPM | BODY MASS INDEX: 31.98 KG/M2 | HEIGHT: 66 IN | OXYGEN SATURATION: 98 % | DIASTOLIC BLOOD PRESSURE: 62 MMHG | WEIGHT: 199 LBS | HEART RATE: 64 BPM | TEMPERATURE: 98 F

## 2024-04-09 DIAGNOSIS — M19.011 OSTEOARTHRITIS OF RIGHT SHOULDER, UNSPECIFIED OSTEOARTHRITIS TYPE: ICD-10-CM

## 2024-04-09 DIAGNOSIS — G20.A1 PARKINSON DISEASE (HCC): ICD-10-CM

## 2024-04-09 DIAGNOSIS — M75.01 ADHESIVE CAPSULITIS OF RIGHT SHOULDER: Primary | ICD-10-CM

## 2024-04-09 DIAGNOSIS — M19.019 GLENOHUMERAL ARTHRITIS: ICD-10-CM

## 2024-04-09 DIAGNOSIS — R00.0 TACHYCARDIA: ICD-10-CM

## 2024-04-09 DIAGNOSIS — M19.029 ELBOW ARTHRITIS: ICD-10-CM

## 2024-04-09 DIAGNOSIS — I10 ESSENTIAL HYPERTENSION, BENIGN: ICD-10-CM

## 2024-04-09 DIAGNOSIS — L03.113 CELLULITIS OF RIGHT UPPER EXTREMITY: ICD-10-CM

## 2024-04-09 PROCEDURE — 1159F MED LIST DOCD IN RCRD: CPT | Performed by: NURSE PRACTITIONER

## 2024-04-09 PROCEDURE — 1111F DSCHRG MED/CURRENT MED MERGE: CPT | Performed by: NURSE PRACTITIONER

## 2024-04-09 PROCEDURE — 99495 TRANSJ CARE MGMT MOD F2F 14D: CPT | Performed by: NURSE PRACTITIONER

## 2024-04-09 PROCEDURE — 3078F DIAST BP <80 MM HG: CPT | Performed by: NURSE PRACTITIONER

## 2024-04-09 PROCEDURE — 1170F FXNL STATUS ASSESSED: CPT | Performed by: NURSE PRACTITIONER

## 2024-04-09 PROCEDURE — 3008F BODY MASS INDEX DOCD: CPT | Performed by: NURSE PRACTITIONER

## 2024-04-09 PROCEDURE — 1160F RVW MEDS BY RX/DR IN RCRD: CPT | Performed by: NURSE PRACTITIONER

## 2024-04-09 PROCEDURE — 3077F SYST BP >= 140 MM HG: CPT | Performed by: NURSE PRACTITIONER

## 2024-04-09 RX ORDER — IBUPROFEN 400 MG/1
400 TABLET ORAL EVERY 6 HOURS PRN
COMMUNITY

## 2024-04-09 NOTE — PROGRESS NOTES
TCM VISIT  [facility] TRANS[facility]ARE [facility]IN[facility]      HISTORY   CHIEF COMPLAINT: HFU-adhesive capsulitis of right shoulder, osteoarthritis of right shoulder, glenohumeral arthritis-right, elbow arthritis, cellulitis of RUE, Parkinson's disease, HTN, tachycardia.     HPI: Benjy Randolph is a 89 year old male here today for hospital follow up for adhesive capsulitis of right shoulder, osteoarthritis of right shoulder, glenohumeral arthritis-right, elbow arthritis, cellulitis of RUE, Parkinson's disease, HTN, tachycardia.  Benjy Randolph was discharged from [facility]  to Home or Self Care.  Admit Date: 4/2/24. Discharge Date: 4/5/24.     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 per care everywhere chart review  #Hypertension  #History of prior chronic diastolic heart failure  #Hyperlipidemia  #Parkinson's disease  #History of gout  #History of colon cancer with prior colectomy and prior chemo  #Osteoarthritis    Hospital stay was uncomplicated.  Benjy Randolph was discharge with Keflex, prednisone, sling as needed, repeat labs in 2 weeks and a referral to the [facility] for continued follow up.      Today, patient is being seen for hospital follow-up.  He reports doing good since discharge.  He is accompanied by family at time of exam.  Declined interpretation via language line and family interpreted throughout exam.    He presented to ED with right hand redness, swelling, and pain.  He also reported right shoulder pain with inability to lift his right arm up.  Per notes symptoms started 2 days prior to admission.  He was recently admitted at [facility] and was diagnosed with left elbow  bursitis which was felt to be infected and had PICC line placed in his right arm.  He was discharged on IV antibiotics with Rocephin until this past Friday.  PICC line was removed as patient's left elbow improved.  Patient then started having worsening pain with redness and swelling.  He also reported stiffness to right arm as well as right shoulder pain with inability to lift the right shoulder.  He was admitted for cellulitis of RUE and found to have frozen shoulder and adhesive capsulitis of right shoulder.  He was treated with IV antibiotics and seen by infectious disease and Ortho as well as PT/OT eval.  Uric acid level was normal.  Blood cultures showed NGTD.  He was given Toradol every 6 hours for pain and was also seen by rheumatology.  He was treated with IV steroids per rheumatology with good response.  Rheumatoid factor was less than 10.  ESR/sed rate improving to 72 from 85 the day prior but still elevated.  CRP elevated and improving as well.  He has history of chronic diastolic HF and is currently not on any diuretics at home.  He improved clinically, cleared by ID, Ortho, and rheumatology for discharge.  He was transition to oral antibiotics at time of discharge per infectious disease.  Follow-up with Ortho and rheumatology as outpatient as directed.  He was discharged home in stable condition.    Interactive contact within 2 business days post discharge first initiated on Date: 4/8/2024      Allergies:  No Known Allergies   Current Meds:  Current Outpatient Medications   Medication Sig Dispense Refill    cephalexin 500 MG Oral Cap Take 1 capsule (500 mg total) by mouth 3 (three) times daily for 5 days. 15 capsule 0    predniSONE 10 MG Oral Tab Take 40mg daily x 3 days, then 30mg daily x 3 days, then 20mg daily x 3 days, then 10mg daily x 3 days then stop 30 tablet 0    amLODIPine 10 MG Oral Tab Take 1 tablet (10 mg total) by mouth at bedtime. 90 tablet 0    lisinopril 20 MG Oral Tab Take 1 tablet (20  mg total) by mouth at bedtime. 90 tablet 0    rosuvastatin 5 MG Oral Tab Take 1 tablet (5 mg total) by mouth nightly. 90 tablet 0    carbidopa-levodopa  MG Oral Tab TAKE 1 TABLET BY MOUTH FOUR TIMES DAILY(7AM, 11AM, 3PM, 7PM) 120 tablet 2    acetaminophen 325 MG Oral Tab Take 1 tablet (325 mg total) by mouth every 6 (six) hours as needed for Pain.       No current facility-administered medications for this visit.       HISTORY: reconciled and reviewed with patient  Past Medical History:    Abdominal pain of unknown etiology    Alcoholism /alcohol abuse    Episodic    Anesthesia complication    difficulty awakening after a surgery many years ago- needed defibrillator shock to come out per daughter,    Aortic sclerosis    Aortic valve sclerosis    Arthritis    Atrophic gastritis    chronic, inactive    Basal cell carcinoma (BCC)    BPH (benign prostatic hyperplasia)    Cancer (HCC)    skin cancer ? kind    Cataract    HAD SURGERY- ??HAS LENS IMPLANTS    Change in vision    Chronic diastolic heart failure (HCC)    Colon cancer (HCC)    Colon polyps    COVID-19 virus infection    Dermatitis    New. Right forearm    EKG, abnormal    Elevated hemoglobin (HCC)    Esophageal reflux    no longer a problem/ resolved    Essential hypertension, benign    Uncontrolled     Heart murmur    Herpes zoster without complication    High cholesterol    History of cardiac murmur    History of colon cancer    History of hernia repair    [place] in [place], then in [place]     History of skin cancer    Hypertensive urgency    Malignant neoplasm of colon (HCC)    Murmur, heart    benign    Osteoarthritis    right shouder- surgery scheduled 06/30/16, also left shoulder    Other and unspecified hyperlipidemia    Parkinson disease (HCC)    Personal history of antineoplastic chemotherapy    2010    Pneumonia due to COVID-19 virus    PONV (postoperative nausea and vomiting)    Postherpetic neuralgia    Prurigo    Retained suture    S/P repair of  ventral hernia    SBO (small bowel obstruction) (HCC)    Shoulder injury    left     Suture granuloma    Tubular adenoma    Uncomfortable fullness after meals    Ventral hernia without obstruction or gangrene    Ventral incisional hernia      Past Surgical History:   Procedure Laterality Date    Appendectomy      Arthroscopy of joint unlisted Bilateral     SURGERY ON BOTH SHOULDERS - UNSURE IF SCOPE OR OPEN    Cataract Bilateral     Colectomy      Colonoscopy  2014    Dr. Priscilla [place], [place]    Hernia surgery      Skin surgery Left 4-14-15    MMS done for SCC, well dif, inv to left antihelix, AB    Skin surgery  2015    MMS - BCC to the Left [place]     Skin surgery  3/29/17    MMS- BCC-micronod to right nasofacial sulcus done by AB    Upper gi endoscopy,exam        Family History   Problem Relation Age of Onset    Heart Attack Sister     Cancer Neg     Heart Disease Neg     Stroke Neg       Social History     Socioeconomic History    Marital status:    Tobacco Use    Smoking status: Former     Current packs/day: 0.00     Average packs/day: 0.3 packs/day for 67.0 years (16.8 ttl pk-yrs)     Types: Cigarettes     Start date: 3/10/1948     Quit date: 3/10/2015     Years since quittin.1    Smokeless tobacco: Never   Vaping Use    Vaping status: Never Used   Substance and Sexual Activity    Alcohol use: Yes     Comment: socially    Drug use: No   Other Topics Concern     Service No    Blood Transfusions No    Caffeine Concern Yes     Comment: 2 cups of coffee daily. 1 soda weekly    Occupational Exposure No    Hobby Hazards No    Sleep Concern Yes    Stress Concern No    Weight Concern No    Special Diet No    Back Care No    Exercise Yes     Comment: Daily walking    Bike Helmet No    Seat Belt Yes    Self-Exams No     Social Determinants of Health     Food Insecurity: No Food Insecurity (2024)    Food Insecurity     Food Insecurity: Never true   Transportation Needs: No Transportation  Needs (4/2/2024)    Transportation Needs     Lack of Transportation: No   Housing Stability: Low Risk  (4/2/2024)    Housing Stability     Housing Instability: No        Imaging & Consults:        Lab Results   Component Value Date/Time    WBC 7.5 04/03/2024 04:26 AM    HGB 13.2 04/03/2024 04:26 AM    HCT 39.4 04/03/2024 04:26 AM    .0 04/03/2024 04:26 AM     (H) 04/03/2024 04:26 AM     04/03/2024 04:26 AM    K 4.4 04/03/2024 04:26 AM    K 4.4 04/03/2024 04:26 AM     04/03/2024 04:26 AM    CO2 29.0 04/03/2024 04:26 AM    BUN 18 04/03/2024 04:26 AM    CREATSERUM 0.95 04/03/2024 04:26 AM    CA 9.2 04/03/2024 04:26 AM    MG 2.3 04/05/2018 10:06 AM    ALB 3.0 (L) 04/02/2024 01:45 PM    ALT 12 (L) 04/02/2024 01:45 PM    AST 19 04/02/2024 01:45 PM    INR 1.10 03/31/2018 05:23 AM    A1C 6.4 (A) 09/28/2022 02:55 PM       Immunization History   Administered Date(s) Administered    >=3 YRS FLUZONE OR FLUARIX QUAD PRESERVE FREE SINGLE DOSE (04203) FLU CLINIC 09/24/2015    Covid-19 Vaccine Sinovac-coronavac 0.5ml 03/14/2021, 04/20/2021    FLU VAC High Dose 65 YRS & Older PRSV Free (04068) 09/27/2016, 01/28/2020, 12/16/2021, 09/28/2022    FLUAD High Dose 65 yr and older (57055) 09/11/2017    FLUZONE 6 months and older PFS 0.5 ml (84624) 09/24/2015    Fluvirin, 3 Years & >, Im 01/22/2015    Fluzone Vaccine Medicare () 09/20/2013, 09/27/2016, 01/28/2020    Pneumococcal (Prevnar 13) 11/09/2017    Pneumovax 23 07/28/2020       ROS:  GENERAL: energy fair/stable, denies fever, + right arm weakness  SKIN: denies any skin changes, + redness present to right arm in one spot  EYES: denies blurred vision, eye pain  HEENT: denies ear pain, loss of hearing, sore throat  RESPIRATORY: denies cough, denies dyspnea with exertion  CARDIOVASCULAR: denies syncope, chest pain, fatigue, palpitations   GI: denies abdominal pain, melena, constipation, diarrhea, nausea, vomiting   MUSCULOSKELETAL: +right shoulder pain,  states normal range of motion of extremities, + limited ROM to right shoulder  NEUROLOGIC: denies confusion, balance difficulty, + tremor in BL UE  PSYCHIATRIC: denies depression or anxiety  HEMATOLOGIC: denies history of anemia, bruising, bleeding    PHYSICAL EXAM:  Vitals:    04/09/24 1428   BP: 150/62   Pulse:    Resp:    Temp:        GENERAL: well developed, well nourished, in no apparent distress, fair/good historian  INTEGUMENTARY: warm, dry, no rashes, + redness present to right arm in one spot  HEENT: atraumatic, normocephalic, sclera white, conjunctivae pink  NECK: supple  CHEST: no chest tenderness  LUNGS: clear to auscultation bilaterally, no wheezes, rhonchi or rales, normal respiratory effort  CARDIO: S1, S2, RRR without audible murmur  GI: + BS to all quadrants, no tenderness  MUSCULOSKELETAL: + limited ROM to right shoulder, normal ROM in all joints, no evidence of swelling, + right shoulder pain with movement   EXTREMITIES: no cyanosis, or edema  NEURO: Oriented x3  PSYCHIATRIC: appropriate affect    ASSESSMENT/ PLAN:   1. Adhesive capsulitis of right shoulder/osteoarthritis of right shoulder/glenohumeral arthritis-right/elbow arthritis/cellulitis of RUE  X-ray of right shoulder showed marked loss of right glenohumeral joint space with subchondral sclerosis along with large right humeral head osteophyte-marked osteoarthritic changes of the right shoulder  X-ray of right elbow showed osteopenia with advanced arthropathy of the radiocapitellar articulation with flattening and degenerative spurring of the radial head  X-ray of right wrist shows osteopenia with advanced arthropathy of the radiocarpal and distal radial ulnar and distal radial ulnar joints with chronic fragmentation of the ulnar styloid process-mild soft tissue swelling diffusely about the wrist  Has 1 area of redness present to right hand with rest of redness improved  No further right hand swelling or pain  No further right arm pain,  redness, swelling  Still unable able to lift right arm at shoulder greater then 30 degrees  Reports pain in right shoulder is 12/10 with movement and is currently not taking any medication for pain  Recommended:  Tylenol 500 mg 1-2 tabs every 6-8 hours as needed for pain  Ibuprofen 600 mg every 6 hours as needed for pain  May alternate between Tylenol/ibuprofen  Tolerating an oral diet  Appetite is normal-eating 2 meals per day/drinking well  Moving bowels/passing gas  Discharged home on:  Keflex 500 mg 3 times daily x 5 days  Prednisone per taper  May use sling to right arm as needed-patient declines to use  Needs repeat CRP, ESR, uric acid in 2 weeks with results to Dr. Ortiz  Was recommended to follow-up with orthopedics as needed-family requesting appointment to be made-follow-up with Dr. Zhang on 5/1 at 1:20 PM  Follow-up with infectious disease Dr. Stringer as needed  Follow-up with rheumatology Dr. Ortiz-left message for office to call patient to schedule [id number] likely be placed on a waiting list for earlier appointment  Follow-up with PCP Dr. Pascual on 4/24 at 1:30 PM  All hospital records, labs, radiology reviewed    2. Parkinson disease (HCC)  Per notes patient had self stopped medications  Was given medications in the hospital and now wishes to restart them  Rx sent for/start:  carbidopa-levodopa  MG Oral Tab; TAKE 1 TABLET BY MOUTH FOUR TIMES DAILY(7AM, 11AM, 3PM, 7PM)  Dispense: 120 tablet; Refill: 0  Follow-up with neurology as directed    3. Essential hypertension, benign  BP was slightly elevated at today's exam at 154/68 with HR of 64  Patient has been checking BP at home and reports only 1 reading of 180/85 with HR of 123  Recommended to start checking BP 2 times daily and morning before medications and at least 4 hours after medications and keep log of BP and HR and bring with to all follow-ups for review  Continue:  Amlodipine 10 mg daily at bedtime  Lisinopril 20 mg at  bedtime  Continue to monitor for S/S of hyper hypotension    4. Tachycardia  Had tachycardia in the hospital with HR up to 123  HR improved throughout hospitalization  HR was stable at today's exam at 64  Continue to monitor HR along with BP and bring log with to all follow-ups for review  Notify PCP/TCC if heart rate greater then 120 sustained x 20 minutes      Orders Placed This Encounter    ibuprofen 400 MG Oral Tab     Sig: Take 1 tablet (400 mg total) by mouth every 6 (six) hours as needed for Pain.    carbidopa-levodopa  MG Oral Tab     Sig: TAKE 1 TABLET BY MOUTH FOUR TIMES DAILY(7AM, 11AM, 3PM, 7PM)     Dispense:  120 tablet     Refill:  0         Medications & Refills for this Visit:   carbidopa-levodopa  MG Oral Tab TAKE 1 TABLET BY MOUTH FOUR TIMES DAILY(7AM, 11AM, 3PM, 7PM) 120 tablet 0    [] cephalexin 500 MG Oral Cap Take 1 capsule (500 mg total) by mouth 3 (three) times daily for 5 days. 15 capsule 0    predniSONE 10 MG Oral Tab Take 40mg daily x 3 days, then 30mg daily x 3 days, then 20mg daily x 3 days, then 10mg daily x 3 days then stop 30 tablet 0    amLODIPine 10 MG Oral Tab Take 1 tablet (10 mg total) by mouth at bedtime. 90 tablet 0    lisinopril 20 MG Oral Tab Take 1 tablet (20 mg total) by mouth at bedtime. 90 tablet 0    rosuvastatin 5 MG Oral Tab Take 1 tablet (5 mg total) by mouth nightly. 90 tablet 0     Requested Prescriptions     Signed Prescriptions Disp Refills    carbidopa-levodopa  MG Oral Tab 120 tablet 0     Sig: TAKE 1 TABLET BY MOUTH FOUR TIMES DAILY(7AM, 11AM, 3PM, 7PM)         Health Maintenance:  Health Maintenance   Topic Date Due    Zoster Vaccines (1 of 2) Never done    COVID-19 Vaccine (3 - 2023-24 season) 2023    MA Annual Health Assessment  2024    HTN: BP Follow-Up  2024    Influenza Vaccine (Season Ended) 10/01/2024    Annual Depression Screening  Completed    Fall Risk Screening (Annual)  Completed    Pneumococcal Vaccine:  65+ Years  Completed    Colonoscopy  Discontinued       Chronic Care Management Referral: N/A      Transitional Care Management Certification:  During the visit, the following was completed:  Obtained and reviewed discharge summary, continuity of care documents, Hospitalist notes, rheumatology notes, orthopedics notes, infectious disease notes, and discharge information   Reviewed Labs (CBC, CMP), Labs (Microbiology), US radiology results, X-Ray radiology results, CRP, lactic acid, uric acid, ESR, rheumatoid factor, CCP, Last A1c value was 6.4% done 9/28/2022., need for follow-up on pending tests, need for follow-up on diagnostic tests, and need for follow-up on treatments    Medication Reconciliation:  I am aware of an inpatient discharge within the last 30 days.  The discharge medication list has been reconciled with the patient's current medication list and reviewed by me.  See medication list for additions of new medication, and changes to current doses of medications and discontinued medications.        Discharge Disposition/Plan:     Future Appointments   Date Time Provider Department Center   4/24/2024  1:30 PM Chyna Pascual DO [facility] [place]   5/1/2024  1:20 PM [id number]      1.  Transitional Care Clinic Visit: Next visit Discharged  2.  PCP Visit: 4/24/2024  3.  Chronic Care Management: Yes     CHRISTY Kim, 4/9/2024  [facility]  [phone]

## 2024-04-09 NOTE — TELEPHONE ENCOUNTER
Patient is scheduled for right shoulder pain. Please advise if imaging is needed.       Future Appointments   Date Time Provider Department Center   5/1/2024  1:20 PM [id number]

## 2024-04-09 NOTE — PATIENT INSTRUCTIONS
PATIENT INSTRUCTIONS:    Please attempt to call Dr. Dejesus's office for a follow up appointment if they have not returned your call within the next day   Heather Ortiz DO  [address] [address] [phone]

## 2024-04-09 NOTE — PROGRESS NOTES
TRANSITIONAL CARE CLINIC PHARMACIST MEDICATION RECONCILIATION        Benjy Randolph MRN [id number]    1935 PCP Chyna Pascual DO       Comments: Medication history completed in Transitional Care Clinic by pharmacist with granddaughter over the phone.     The following medication changes occurred while patient was hospitalized:  Medications Started:   Cephalexin 500 mg cap - 1 capsule three times daily for 5 days (therapy to complete 4/10/24)  Prednisone 10 mg tab - Taper as instructed     Outpatient Encounter Medications as of 2024   Medication Sig    cephalexin 500 MG Oral Cap Take 1 capsule (500 mg total) by mouth 3 (three) times daily for 5 days.    predniSONE 10 MG Oral Tab Take 40mg daily x 3 days, then 30mg daily x 3 days, then 20mg daily x 3 days, then 10mg daily x 3 days then stop    amLODIPine 10 MG Oral Tab Take 1 tablet (10 mg total) by mouth at bedtime.    lisinopril 20 MG Oral Tab Take 1 tablet (20 mg total) by mouth at bedtime.    rosuvastatin 5 MG Oral Tab Take 1 tablet (5 mg total) by mouth nightly.    ibuprofen 400 MG Oral Tab Take 1 tablet (400 mg total) by mouth every 6 (six) hours as needed for Pain.    carbidopa-levodopa  MG Oral Tab TAKE 1 TABLET BY MOUTH FOUR TIMES DAILY(7AM, 11AM, 3PM, 7PM)    acetaminophen 325 MG Oral Tab Take 1 tablet (325 mg total) by mouth every 6 (six) hours as needed for Pain.     Medication Adherence Assessment:   Family reports patient has started taking both cephalexin and prednisone as prescribed on discharge. Provided education on prednisone taper; patient confirmed understanding.     Family is also requesting a new prescription for carbidopa-levodopa be sent to preferred pharmacy; will pend for APRN review.     Reviewed all medications in detail with patient including dose, indication, timing of administration, monitoring parameters, and potential side effects of medications.   Patient confirmed understanding.     Thank you,    Sasha  Getchel, PharmD, 4/9/2024, 1:39 PM  Transitional Care Clinic

## 2024-04-18 PROBLEM — R00.0 TACHYCARDIA: Status: ACTIVE | Noted: 2024-04-18

## 2024-04-24 ENCOUNTER — TELEPHONE (OUTPATIENT)
Dept: FAMILY MEDICINE CLINIC | Facility: CLINIC | Age: 89
End: 2024-04-24

## 2024-04-24 DIAGNOSIS — D48.5 NEOPLASM OF UNCERTAIN BEHAVIOR OF SKIN: Primary | ICD-10-CM

## 2024-04-24 NOTE — TELEPHONE ENCOUNTER
Spoke w/ patient's granddaughter. BP has been running higher the last 2 weeks. It was 165/90 today and it has been running about that. He was hospitalized earlier this month for cellulitis. This has gotten much better, almost resolved. His BP was very elevated when he first went in to the hospital but it did come down. He is on amlodipine 10mg and lisinopril 20mg. Do you want to make any med changes for now or continue to monitor? He was rescheduled from today to to 5/23.      Referral also placed for  to follow up on his skin cancer.

## 2024-04-24 NOTE — TELEPHONE ENCOUNTER
Patient daughter was called to reschedule patient appointment for today due to  not feeling well. Daughter states they have been monitoring blood pressure for two weeks. Blood pressure has been low 165/90. On one instance patient got up to open front door, and blood pressure was at 179 just by getting up. Daughter requesting if blood pressure medication needs to be adjusted. Or advise on what to do. Would like to be seen sooner for an appointment. Aware he is on wait list.     Please advise

## 2024-04-25 RX ORDER — LISINOPRIL 20 MG/1
20 TABLET ORAL 2 TIMES DAILY
Qty: 60 TABLET | Refills: 0 | Status: SHIPPED | OUTPATIENT
Start: 2024-04-25

## 2024-04-25 NOTE — TELEPHONE ENCOUNTER
1.  Recommend increase lisinopril 20 mg tab to twice a day and continue the amlodipine 10 mg tab nightly.  2.  My understanding is this patient usually sees Dr. English with  [facility] who has done multiple skin cancer surgeries on this patient, I did place a referral for patient to be seen by Dr. English.

## 2024-04-25 NOTE — TELEPHONE ENCOUNTER
Spoke w/ Lizbeth. Advised of new lisinopril dosing and instructions for use. Verbalized understanding.  Per Russel patient is seeing .  Russel also reported concern that she thought patient's hand was starting to look swollen again. I did recommend she follow up with 's office in regards to this as that is who saw him in the hospital and he is scheduled to see him for follow up.  Verbalized understanding.

## 2024-04-26 ENCOUNTER — APPOINTMENT (OUTPATIENT)
Dept: GENERAL RADIOLOGY | Facility: HOSPITAL | Age: 89
End: 2024-04-26
Payer: MEDICARE

## 2024-04-26 ENCOUNTER — HOSPITAL ENCOUNTER (EMERGENCY)
Facility: HOSPITAL | Age: 89
Discharge: HOME | End: 2024-04-26
Attending: EMERGENCY MEDICINE
Payer: MEDICARE

## 2024-04-26 VITALS
SYSTOLIC BLOOD PRESSURE: 155 MMHG | HEART RATE: 67 BPM | OXYGEN SATURATION: 94 % | DIASTOLIC BLOOD PRESSURE: 63 MMHG | TEMPERATURE: 99 F | RESPIRATION RATE: 18 BRPM

## 2024-04-26 DIAGNOSIS — M19.90 INFLAMMATORY ARTHRITIS: Primary | ICD-10-CM

## 2024-04-26 LAB
ALBUMIN SERPL-MCNC: 2.9 G/DL (ref 3.4–5)
ALBUMIN/GLOB SERPL: 0.7 {RATIO} (ref 1–2)
ALP LIVER SERPL-CCNC: 85 U/L
ALT SERPL-CCNC: 10 U/L
ANION GAP SERPL CALC-SCNC: 7 MMOL/L (ref 0–18)
AST SERPL-CCNC: 16 U/L (ref 15–37)
BASOPHILS # BLD AUTO: 0.03 X10(3) UL (ref 0–0.2)
BASOPHILS NFR BLD AUTO: 0.4 %
BILIRUB SERPL-MCNC: 0.5 MG/DL (ref 0.1–2)
BUN BLD-MCNC: 18 MG/DL (ref 9–23)
CALCIUM BLD-MCNC: 9.6 MG/DL (ref 8.5–10.1)
CHLORIDE SERPL-SCNC: 108 MMOL/L (ref 98–112)
CO2 SERPL-SCNC: 25 MMOL/L (ref 21–32)
CREAT BLD-MCNC: 0.96 MG/DL
CRP SERPL-MCNC: 5.51 MG/DL (ref ?–0.3)
EGFRCR SERPLBLD CKD-EPI 2021: 76 ML/MIN/1.73M2 (ref 60–?)
EOSINOPHIL # BLD AUTO: 0.18 X10(3) UL (ref 0–0.7)
EOSINOPHIL NFR BLD AUTO: 2.3 %
ERYTHROCYTE [DISTWIDTH] IN BLOOD BY AUTOMATED COUNT: 12.5 %
ERYTHROCYTE [SEDIMENTATION RATE] IN BLOOD: 88 MM/HR
GLOBULIN PLAS-MCNC: 4.4 G/DL (ref 2.8–4.4)
GLUCOSE BLD-MCNC: 145 MG/DL (ref 70–99)
HCT VFR BLD AUTO: 40.5 %
HGB BLD-MCNC: 13.7 G/DL
IMM GRANULOCYTES # BLD AUTO: 0.02 X10(3) UL (ref 0–1)
IMM GRANULOCYTES NFR BLD: 0.3 %
LYMPHOCYTES # BLD AUTO: 1.45 X10(3) UL (ref 1–4)
LYMPHOCYTES NFR BLD AUTO: 18.7 %
MCH RBC QN AUTO: 30.9 PG (ref 26–34)
MCHC RBC AUTO-ENTMCNC: 33.8 G/DL (ref 31–37)
MCV RBC AUTO: 91.4 FL
MONOCYTES # BLD AUTO: 0.74 X10(3) UL (ref 0.1–1)
MONOCYTES NFR BLD AUTO: 9.5 %
NEUTROPHILS # BLD AUTO: 5.33 X10 (3) UL (ref 1.5–7.7)
NEUTROPHILS # BLD AUTO: 5.33 X10(3) UL (ref 1.5–7.7)
NEUTROPHILS NFR BLD AUTO: 68.8 %
OSMOLALITY SERPL CALC.SUM OF ELEC: 294 MOSM/KG (ref 275–295)
PLATELET # BLD AUTO: 151 10(3)UL (ref 150–450)
POTASSIUM SERPL-SCNC: 3.7 MMOL/L (ref 3.5–5.1)
PROT SERPL-MCNC: 7.3 G/DL (ref 6.4–8.2)
RBC # BLD AUTO: 4.43 X10(6)UL
SODIUM SERPL-SCNC: 140 MMOL/L (ref 136–145)
URATE SERPL-MCNC: 5.2 MG/DL
WBC # BLD AUTO: 7.8 X10(3) UL (ref 4–11)

## 2024-04-26 PROCEDURE — 85025 COMPLETE CBC W/AUTO DIFF WBC: CPT | Performed by: EMERGENCY MEDICINE

## 2024-04-26 PROCEDURE — 73130 X-RAY EXAM OF HAND: CPT | Performed by: EMERGENCY MEDICINE

## 2024-04-26 PROCEDURE — 84550 ASSAY OF BLOOD/URIC ACID: CPT | Performed by: EMERGENCY MEDICINE

## 2024-04-26 PROCEDURE — 80053 COMPREHEN METABOLIC PANEL: CPT | Performed by: EMERGENCY MEDICINE

## 2024-04-26 PROCEDURE — 99284 EMERGENCY DEPT VISIT MOD MDM: CPT

## 2024-04-26 PROCEDURE — 85652 RBC SED RATE AUTOMATED: CPT | Performed by: EMERGENCY MEDICINE

## 2024-04-26 PROCEDURE — 99285 EMERGENCY DEPT VISIT HI MDM: CPT

## 2024-04-26 PROCEDURE — 86140 C-REACTIVE PROTEIN: CPT | Performed by: EMERGENCY MEDICINE

## 2024-04-26 PROCEDURE — 36415 COLL VENOUS BLD VENIPUNCTURE: CPT

## 2024-04-26 RX ORDER — LISINOPRIL 20 MG/1
20 TABLET ORAL 2 TIMES DAILY
Qty: 180 TABLET | Refills: 0 | OUTPATIENT
Start: 2024-04-26

## 2024-04-26 RX ORDER — PANTOPRAZOLE SODIUM 40 MG/1
40 TABLET, DELAYED RELEASE ORAL DAILY
Qty: 30 TABLET | Refills: 0 | Status: SHIPPED | OUTPATIENT
Start: 2024-04-26 | End: 2024-05-26

## 2024-04-26 RX ORDER — PREDNISONE 10 MG/1
TABLET ORAL
Qty: 30 TABLET | Refills: 0 | Status: SHIPPED | OUTPATIENT
Start: 2024-04-26 | End: 2024-05-08

## 2024-04-26 RX ORDER — NAPROXEN 375 MG/1
375 TABLET ORAL 2 TIMES DAILY PRN
Qty: 20 TABLET | Refills: 0 | Status: SHIPPED | OUTPATIENT
Start: 2024-04-26 | End: 2024-05-06

## 2024-04-26 NOTE — ED INITIAL ASSESSMENT (HPI)
Pt presents to ED with c/o of edema to right hand. Denies pain. Pt states fingers are hard to move freely.

## 2024-04-27 NOTE — ED PROVIDER NOTES
Patient Seen in: [facility] Emergency Department      History     Chief Complaint   Patient presents with    Swelling     Stated Complaint: right hand swelling, no trauma, hx of cellulitis    Subjective:   HPI    89-year-old male with a past medical history as below including Parkinson's disease, hypertension, hyperlipidemia, gout brought by son due to right wrist and hand redness, swelling and pain starting 2 days ago.  Patient is Turkish-speaking with son translating per patient preference.  Son states he had swelling in that area when he was admitted earlier this month.  He states it was worse at that time and extended up to his elbow and shoulder which he has not noted this time.  Patient denies any fever, chills, malaise, nausea or vomiting.    Objective:   Past Medical History:    Abdominal pain of unknown etiology    Alcoholism /alcohol abuse    Episodic    Anesthesia complication    difficulty awakening after a surgery many years ago- needed defibrillator shock to come out per daughter,    Aortic sclerosis    Aortic valve sclerosis    Arthritis    Atrophic gastritis    chronic, inactive    Basal cell carcinoma (BCC)    BPH (benign prostatic hyperplasia)    Cancer (HCC)    skin cancer ? kind    Cataract    HAD SURGERY- ??HAS LENS IMPLANTS    Change in vision    Chronic diastolic heart failure (HCC)    Colon cancer (HCC)    Colon polyps    COVID-19 virus infection    Dermatitis    New. Right forearm    EKG, abnormal    Elevated hemoglobin (HCC)    Esophageal reflux    no longer a problem/ resolved    Essential hypertension, benign    Uncontrolled     Heart murmur    Herpes zoster without complication    High cholesterol    History of cardiac murmur    History of colon cancer    History of hernia repair    [place]     History of skin cancer    Hypertensive urgency    Malignant neoplasm of colon (HCC)    Murmur, heart    benign    Osteoarthritis    right shouder- surgery scheduled  06/30/16, also left shoulder    Other and unspecified hyperlipidemia    Parkinson disease (HCC)    Personal history of antineoplastic chemotherapy    2010    Pneumonia due to COVID-19 virus    PONV (postoperative nausea and vomiting)    Postherpetic neuralgia    Prurigo    Retained suture    S/P repair of ventral hernia    SBO (small bowel obstruction) (HCC)    Shoulder injury    left     Suture granuloma    Tubular adenoma    Uncomfortable fullness after meals    Ventral hernia without obstruction or gangrene    Ventral incisional hernia              No pertinent past surgical history.              No pertinent social history.            Review of Systems    Positive for stated complaint: right hand swelling, no trauma, hx of cellulitis  Other systems are as noted in HPI.  Constitutional and vital signs reviewed.      All other systems reviewed and negative except as noted above.    Physical Exam     ED Triage Vitals [04/26/24 1943]   /63   Pulse 67   Resp 18   Temp 98.9 °F (37.2 °C)   Temp src Temporal   SpO2 94 %   O2 Device None (Room air)       Current:/63   Pulse 67   Temp 98.9 °F (37.2 °C) (Temporal)   Resp 18   SpO2 94%         Physical Exam  Vitals and nursing note reviewed.   Constitutional:       General: He is not in acute distress.     Appearance: He is well-developed. He is not ill-appearing.   HENT:      Head: Normocephalic and atraumatic.      Mouth/Throat:      Mouth: Mucous membranes are moist.   Eyes:      General: No scleral icterus.     Extraocular Movements: Extraocular movements intact.   Cardiovascular:      Rate and Rhythm: Normal rate and regular rhythm.   Pulmonary:      Effort: Pulmonary effort is normal.      Breath sounds: Normal breath sounds.   Musculoskeletal:      Cervical back: Neck supple.      Comments: Right dorsal hand and wrist with erythema, increased warmth and tenderness  Good ROM but with pain   Skin:     General: Skin is warm and dry.      Capillary  Refill: Capillary refill takes less than 2 seconds.   Neurological:      Mental Status: He is alert and oriented to person, place, and time.      GCS: GCS eye subscore is 4. GCS verbal subscore is 5. GCS motor subscore is 6.      Comments: Resting tremor   Psychiatric:         Mood and Affect: Mood normal.         Behavior: Behavior normal.               ED Course     Labs Reviewed   COMP METABOLIC PANEL (14) - Abnormal; Notable for the following components:       Result Value    Glucose 145 (*)     ALT 10 (*)     Albumin 2.9 (*)     A/G Ratio 0.7 (*)     All other components within normal limits   C-REACTIVE PROTEIN - Abnormal; Notable for the following components:    C-Reactive Protein 5.51 (*)     All other components within normal limits   SED RATE, WESTERGREN (AUTOMATED) - Abnormal; Notable for the following components:    Sed Rate 88 (*)     All other components within normal limits   URIC ACID - Normal   CBC WITH DIFFERENTIAL WITH PLATELET    Narrative:     The following orders were created for panel order CBC With Differential With Platelet.  Procedure                               Abnormality         Status                     ---------                               -----------         ------                     CBC W/ DIFFERENTIAL[[id number]]                              Final result                 Please view results for these tests on the individual orders.   RAINBOW DRAW LAVENDER   RAINBOW DRAW LIGHT GREEN   RAINBOW DRAW BLUE   RAINBOW DRAW GOLD   CBC W/ DIFFERENTIAL             XR HAND (MIN 3 VIEWS), RIGHT (CPT=73130)    Result Date: 4/26/2024  CONCLUSION:  No acute fracture or dislocation.  LOCATION:  [facility]   Dictated by (CST): Srikanth Nguyen MD on 4/26/2024 at 7:45 PM     Finalized by (CST): Srikanth Nguyen MD on 4/26/2024 at 7:52 PM               MDM   89-year-old male with a past medical history as below including Parkinson's disease, hypertension, hyperlipidemia, gout brought by son due to right  wrist and hand redness, swelling and pain starting 2 days ago.      Differential includes but is not limited to cellulitis, gout, less likely septic arthritis    Labs show normal WBC 7.8 without left shift.  Inflammatory markers are elevated similar to previous with ESR 88 and CRP 5.5.  Electrolytes and renal function are normal.    Independent interpretation of hand x-rays negative for fracture or other acute findings.  Radiology report reviewed as above.    Discussed with ID Dr. Stringer who saw the patient during his recent hospitalization.  She does not think his symptoms are infectious and does not recommend antibiotics.  His symptoms are likely more due to an inflammatory arthritis or possibly gout.  Recommends treatment with steroids.  Will also give short course of naproxen with GI prophylaxis.  Will have patient follow-up with PCP and rheumatology for further outpatient management.  Return precautions were discussed.      Medical Decision Making  Amount and/or Complexity of Data Reviewed  Independent Historian: caregiver     Details: Son, see HPI  Labs: ordered. Decision-making details documented in ED Course.  Radiology: ordered and independent interpretation performed. Decision-making details documented in ED Course.    Risk  Decision regarding hospitalization.        Disposition and Plan     Clinical Impression:  1. Inflammatory arthritis         Disposition:  Discharge  4/26/2024  9:52 pm    Follow-up:  Chyna Pascual DO  [address]  [facility] [address] [phone]    Schedule an appointment as soon as possible for a visit in 2 day(s)      Heather Ortiz DO  [address]  [facility] [address] [phone]    Schedule an appointment as soon as possible for a visit in 2 day(s)            Medications Prescribed:  Current Discharge Medication List        START taking these medications    Details   !! predniSONE 10 MG Oral Tab Take 4 tablets (40 mg total) by mouth daily for 3 days,  THEN 3 tablets (30 mg total) daily for 3 days, THEN 2 tablets (20 mg total) daily for 3 days, THEN 1 tablet (10 mg total) daily for 3 days.  Qty: 30 tablet, Refills: 0      pantoprazole 40 MG Oral Tab EC Take 1 tablet (40 mg total) by mouth daily.  Qty: 30 tablet, Refills: 0      naproxen 375 MG Oral Tab Take 1 tablet (375 mg total) by mouth 2 (two) times daily as needed.  Qty: 20 tablet, Refills: 0       !! - Potential duplicate medications found. Please discuss with provider.

## 2024-04-30 ENCOUNTER — TELEPHONE (OUTPATIENT)
Dept: RHEUMATOLOGY | Facility: CLINIC | Age: 89
End: 2024-04-30

## 2024-04-30 NOTE — TELEPHONE ENCOUNTER
Called pt daughter, ELOINA for her to call our office. Dr. Ortiz requested pt to get labs, and to check on pt condition.

## 2024-05-01 ENCOUNTER — PATIENT OUTREACH (OUTPATIENT)
Dept: CASE MANAGEMENT | Age: 89
End: 2024-05-01

## 2024-05-01 ENCOUNTER — OFFICE VISIT (OUTPATIENT)
Dept: ORTHOPEDICS CLINIC | Facility: CLINIC | Age: 89
End: 2024-05-01
Payer: COMMERCIAL

## 2024-05-01 VITALS — BODY MASS INDEX: 31.98 KG/M2 | WEIGHT: 199 LBS | HEIGHT: 66 IN

## 2024-05-01 DIAGNOSIS — M19.011 PRIMARY OSTEOARTHRITIS OF RIGHT SHOULDER: Primary | ICD-10-CM

## 2024-05-01 PROCEDURE — 1160F RVW MEDS BY RX/DR IN RCRD: CPT | Performed by: ORTHOPAEDIC SURGERY

## 2024-05-01 PROCEDURE — 3008F BODY MASS INDEX DOCD: CPT | Performed by: ORTHOPAEDIC SURGERY

## 2024-05-01 PROCEDURE — 1159F MED LIST DOCD IN RCRD: CPT | Performed by: ORTHOPAEDIC SURGERY

## 2024-05-01 PROCEDURE — 1111F DSCHRG MED/CURRENT MED MERGE: CPT | Performed by: ORTHOPAEDIC SURGERY

## 2024-05-01 PROCEDURE — 99203 OFFICE O/P NEW LOW 30 MIN: CPT | Performed by: ORTHOPAEDIC SURGERY

## 2024-05-01 NOTE — PROGRESS NOTES
1st attempt ER f/up apt request  No answer, LVMTCB to schedule apt  PCP -existing apt (5/23), unable to contact  Closing encounter

## 2024-05-02 NOTE — PROGRESS NOTES
[facility] New Consult    Chief Complaint   Patient presents with    Shoulder Pain     RT SHOULDER PAIN;ONSET:APPROXIMATELY FEW YEARS AGO  -swelling pan radiates down to hand  -denies any falls or injuries   -last cortisone inj few years ago  -cannot sleep during the night due to pain       HPI: The patient is a 89 year old male who presents for orthopaedic consultation with his daughter with complaints of chronic right shoulder pain and stiffness.  Symptoms have worsened over the past several years.  The patient describes diminishing range of motion, mechanical crepitus and catching, subjective weakness and increasing pain with activities of daily living.  There is no report of recent injury, instability, locking or radiating pain.  Unfortunately, he is just recovering from a right upper extremity infection described as cellulitis.  He also had treatment for what was described as an elbow infection in March.  At this point he is just recovered from these treatments.  He denies any residual constitutional symptoms, redness, warmth of the right shoulder.  He wonders if an injection would be an option as this has helped in the past.    Past Medical History:    Abdominal pain of unknown etiology    Alcoholism /alcohol abuse    Episodic    Anesthesia complication    difficulty awakening after a surgery many years ago- needed defibrillator shock to come out per daughter,    Aortic sclerosis    Aortic valve sclerosis    Arthritis    Atrophic gastritis    chronic, inactive    Basal cell carcinoma (BCC)    BPH (benign prostatic hyperplasia)    Cancer (HCC)    skin cancer ? kind    Cataract    HAD SURGERY- ??HAS LENS IMPLANTS    Change in vision    Chronic diastolic heart failure (HCC)    Colon cancer (HCC)    Colon polyps    COVID-19 virus infection    Dermatitis    New. Right forearm    EKG, abnormal    Elevated hemoglobin (HCC)    Esophageal reflux    no longer a problem/ resolved    Essential hypertension,  benign    Uncontrolled     Heart murmur    Herpes zoster without complication    High cholesterol    History of cardiac murmur    History of colon cancer    History of hernia repair    [place] in [place]     History of skin cancer    Hypertensive urgency    Malignant neoplasm of colon (HCC)    Murmur, heart    benign    Osteoarthritis    right shouder- surgery scheduled 06/30/16, also left shoulder    Other and unspecified hyperlipidemia    Parkinson disease (HCC)    Personal history of antineoplastic chemotherapy    2010    Pneumonia due to COVID-19 virus    PONV (postoperative nausea and vomiting)    Postherpetic neuralgia    Prurigo    Retained suture    S/P repair of ventral hernia    SBO (small bowel obstruction) (HCC)    Shoulder injury    left     Suture granuloma    Tubular adenoma    Uncomfortable fullness after meals    Ventral hernia without obstruction or gangrene    Ventral incisional hernia     Past Surgical History:   Procedure Laterality Date    Appendectomy      Arthroscopy of joint unlisted Bilateral     SURGERY ON BOTH SHOULDERS - UNSURE IF SCOPE OR OPEN    Cataract Bilateral     Colectomy      Colonoscopy  9-    Dr. Priscilla [place], [place]    Hernia surgery      Skin surgery Left 4-14-15    MMS done for SCC, well dif, inv to left antihelix, AB    Skin surgery  5/4/2015    MMS - BCC to the Left [place]     Skin surgery  3/29/17    MMS- BCC-micronod to right nasofacial sulcus done by AB    Upper gi endoscopy,exam       Current Outpatient Medications   Medication Sig Dispense Refill    predniSONE 10 MG Oral Tab Take 4 tablets (40 mg total) by mouth daily for 3 days, THEN 3 tablets (30 mg total) daily for 3 days, THEN 2 tablets (20 mg total) daily for 3 days, THEN 1 tablet (10 mg total) daily for 3 days. 30 tablet 0    pantoprazole 40 MG Oral Tab EC Take 1 tablet (40 mg total) by mouth daily. 30 tablet 0    naproxen 375 MG Oral Tab Take 1 tablet (375 mg total) by mouth 2 (two) times daily  as needed. 20 tablet 0    lisinopril 20 MG Oral Tab Take 1 tablet (20 mg total) by mouth in the morning and 1 tablet (20 mg total) before bedtime. 60 tablet 0    amLODIPine 10 MG Oral Tab Take 1 tablet (10 mg total) by mouth at bedtime. 90 tablet 0    rosuvastatin 5 MG Oral Tab Take 1 tablet (5 mg total) by mouth nightly. 90 tablet 0    ibuprofen 400 MG Oral Tab Take 1 tablet (400 mg total) by mouth every 6 (six) hours as needed for Pain. (Patient not taking: Reported on 2024)       No Known Allergies  Family History   Problem Relation Age of Onset    Heart Attack Sister     Cancer Neg     Heart Disease Neg     Stroke Neg      Social History     Occupational History    Not on file   Tobacco Use    Smoking status: Former     Current packs/day: 0.00     Average packs/day: 0.3 packs/day for 67.0 years (16.8 ttl pk-yrs)     Types: Cigarettes     Start date: 3/10/1948     Quit date: 3/10/2015     Years since quittin.1    Smokeless tobacco: Never   Vaping Use    Vaping status: Never Used   Substance and Sexual Activity    Alcohol use: Yes     Comment: socially    Drug use: No    Sexual activity: Not on file        ROS:  Complete ROS reviewed by me and non-contributory to the chief complaint except as mentioned above.    Physical Exam:    Ht 5' 6\" (1.676 m)   Wt 199 lb (90.3 kg)   BMI 32.12 kg/m²   Constitutional: Well developed, well nourished 89 year old male presenting with his daughter  Psychological: NAD, alert and appropriate  Respiratory: Breathing comfortably on room air with RR of 10-14  Cardiac: Palpable distal pulses with pink warm extremities distally  Right shoulder: Inspection reveals no discoloration, deformity or swelling.  Palpation reveals significant tenderness at the anterior and posterior glenohumeral joint lines.  Mild intermittent crepitus is palpable on passive rotation.  There is limited range of motion in forward elevation estimated at 100 degrees, abduction to 70 degrees and external  rotation actively to 25 degrees.  Gentle resistive testing generates pain but seemingly intact rotator cuff power.  No obvious hand wrist or elbow abnormalities.  Neurovascular status is intact on sensory, motor and perfusion assessment distally.    Imaging: Multiple views of the involved right shoulder obtained 4/3/2024 personally viewed, demonstrating significant glenohumeral joint space narrowing, marginal spur formation and subchondral sclerosis consistent with osteoarthritis.    Assessment/Diagnoses:  Diagnoses and all orders for this visit:    Primary osteoarthritis of right shoulder      Plan:  I reviewed imaging and exam findings with the patient and his daughter.  The diagnosis is degenerative joint disease of the shoulder.  We discussed the etiology, natural history and treatment options in detail.  Treatments include activity modification to avoid repetitive or loading activities to the shoulder.  Oral and topical anti-inflammatory use, intermittent icing and intra-articular injections may also provide temporary relief.  In the long term, the patient's symptoms may progress and warrant consideration of total shoulder arthroplasty, but only if symptoms become severe.  For now, non-surgical treatment options are recommended.  We discussed the option for shoulder corticosteroid injection along with the potential risks, benefits and alternatives.  In light of recent recovery from cellulitis in the right upper extremity and nonspecific infection of the left elbow, I would not recommend any arthrocentesis or corticosteroid introduction to the right shoulder joint.  He theoretically may be at increased risk for infection associated with the injection.  Although this complication is extremely rare it can be catastrophic.  He and his daughter verbalized understanding and agreement.  Conservative measures are therefore recommended with gentle stretching to maintain flexibility and range of motion.  Perhaps after  several months elapsed with no recurrent sections or symptoms he could be considered for injection if indicated.  Follow-up with us as needed.      Tata Zhnag MD, FAAOS  Orthopaedic Surgery   Sports Medicine/Knee and Shoulder  [facility]  t: [phone]  f: [phone]               This document was partially prepared using Dragon Medical voice recognition software.  Although every attempt is made to correct errors during dictation, discrepancies may still exist.

## 2024-05-23 ENCOUNTER — OFFICE VISIT (OUTPATIENT)
Dept: FAMILY MEDICINE CLINIC | Facility: CLINIC | Age: 89
End: 2024-05-23

## 2024-05-23 DIAGNOSIS — I73.9 SMALL VESSEL DISEASE (HCC): ICD-10-CM

## 2024-05-23 DIAGNOSIS — I50.32 CHRONIC DIASTOLIC HEART FAILURE (HCC): ICD-10-CM

## 2024-05-23 DIAGNOSIS — F32.2 CURRENT SEVERE EPISODE OF MAJOR DEPRESSIVE DISORDER WITHOUT PSYCHOTIC FEATURES WITHOUT PRIOR EPISODE (HCC): ICD-10-CM

## 2024-05-23 DIAGNOSIS — I67.9 CEREBROVASCULAR SMALL VESSEL DISEASE: ICD-10-CM

## 2024-05-23 DIAGNOSIS — I70.0 ATHEROSCLEROSIS OF ABDOMINAL AORTA (HCC): ICD-10-CM

## 2024-05-23 DIAGNOSIS — F10.21 ALCOHOL USE DISORDER, MODERATE, IN SUSTAINED REMISSION (HCC): ICD-10-CM

## 2024-05-23 DIAGNOSIS — Z00.00 MEDICARE ANNUAL WELLNESS VISIT, SUBSEQUENT: Primary | ICD-10-CM

## 2024-05-23 DIAGNOSIS — E46 HYPOALBUMINEMIA DUE TO PROTEIN-CALORIE MALNUTRITION (HCC): ICD-10-CM

## 2024-05-23 DIAGNOSIS — E78.00 HYPERCHOLESTEROLEMIA: ICD-10-CM

## 2024-05-23 DIAGNOSIS — E88.09 HYPOALBUMINEMIA DUE TO PROTEIN-CALORIE MALNUTRITION (HCC): ICD-10-CM

## 2024-05-23 DIAGNOSIS — G20.A1 PARKINSON'S DISEASE, UNSPECIFIED WHETHER DYSKINESIA PRESENT, UNSPECIFIED WHETHER MANIFESTATIONS FLUCTUATE (HCC): ICD-10-CM

## 2024-05-23 DIAGNOSIS — F03.90 DEMENTIA, UNSPECIFIED DEMENTIA SEVERITY, UNSPECIFIED DEMENTIA TYPE, UNSPECIFIED WHETHER BEHAVIORAL, PSYCHOTIC, OR MOOD DISTURBANCE OR ANXIETY (HCC): ICD-10-CM

## 2024-05-23 DIAGNOSIS — I10 ESSENTIAL HYPERTENSION, BENIGN: ICD-10-CM

## 2024-05-23 RX ORDER — ROSUVASTATIN CALCIUM 5 MG/1
5 TABLET, COATED ORAL NIGHTLY
Qty: 90 TABLET | Refills: 1 | Status: SHIPPED | OUTPATIENT
Start: 2024-05-23

## 2024-05-23 RX ORDER — LISINOPRIL 20 MG/1
20 TABLET ORAL 2 TIMES DAILY
Qty: 180 TABLET | Refills: 1 | Status: SHIPPED | OUTPATIENT
Start: 2024-05-23

## 2024-05-23 RX ORDER — AMLODIPINE BESYLATE 10 MG/1
10 TABLET ORAL NIGHTLY
Qty: 90 TABLET | Refills: 1 | Status: SHIPPED | OUTPATIENT
Start: 2024-05-23

## 2024-05-23 NOTE — PATIENT INSTRUCTIONS
Benjy Randolph's SCREENING SCHEDULE   Tests on this list are recommended by your physician but may not be covered, or covered at this frequency, by your insurer.   Please check with your insurance carrier before scheduling to verify coverage.   PREVENTATIVE SERVICES FREQUENCY &  COVERAGE DETAILS LAST COMPLETION DATE   Diabetes Screening    Fasting Blood Sugar / Glucose    One screening every 12 months if never tested or if previously tested but not diagnosed with pre-diabetes   One screening every 6 months if diagnosed with pre-diabetes Lab Results   Component Value Date     (H) 04/26/2024        Cardiovascular Disease Screening    Lipid Panel  Cholesterol  Lipoprotein (HDL)  Triglycerides Covered every 5 years for all Medicare beneficiaries without apparent signs or symptoms of cardiovascular disease Lab Results   Component Value Date    CHOLEST 249 (H) 01/20/2023    HDL 45 01/20/2023     (H) 01/20/2023    TRIG 175 (H) 01/20/2023         Electrocardiogram (EKG)   Covered if needed at Welcome to Medicare, and non-screening if indicated for medical reasons 02/27/2024      Ultrasound Screening for Abdominal Aortic Aneurysm (AAA) Covered once in a lifetime for one of the following risk factors   • Men who are 65-75 years old and have ever smoked   • Anyone with a family history -     Colorectal Cancer Screening  Covered for ages 50-85; only need ONE of the following:    Colonoscopy   Covered every 10 years    Covered every 2 years if patient is at high risk or previous colonoscopy was abnormal 09/16/2014    No recommendations at this time    Flexible Sigmoidoscopy   Covered every 4 years -    Fecal Occult Blood Test Covered annually -   Prostate Cancer Screening    Prostate-Specific Antigen (PSA) Annually No results found for: \"PSA\"  There are no preventive care reminders to display for this patient.   Immunizations    Influenza Covered once per flu season  Please get every year -  No  recommendations at this time    Pneumococcal Each vaccine ([id number] & [id number]) covered once after 65 Prevnar 13: 11/09/2017    [id number]: 07/28/2020     No recommendations at this time    Hepatitis B One screening covered for patients with certain risk factors   -  No recommendations at this time    Tetanus Toxoid Not covered by Medicare Part B unless medically necessary (cut with metal); may be covered with your pharmacy prescription benefits -    Tetanus, Diptheria and Pertusis TD and TDaP Not covered by Medicare Part B -  No recommendations at this time    Zoster Not covered by Medicare Part B; may be covered with your pharmacy  prescription benefits -  Zoster Vaccines(1 of 2) Never done     Annual Monitoring of Persistent Medications (ACE/ARB, digoxin diuretics, anticonvulsants)    Potassium Annually Lab Results   Component Value Date    K 3.7 04/26/2024         Creatinine   Annually Lab Results   Component Value Date    CREATSERUM 0.96 04/26/2024         BUN Annually Lab Results   Component Value Date    BUN 18 04/26/2024       Drug Serum Conc Annually No results found for: \"DIGOXIN\", \"DIG\", \"VALP\"

## 2024-05-23 NOTE — PROGRESS NOTES
Subjective:   Benjy Randolph is a 89 year old male who presents for a MA (Medicare Advantage) Supervisit (Once per calendar year) and scheduled follow up of multiple significant but stable problems and need for follow-up on hypertension, heart failure, history of skin cancer, Parkinson's disease, and dementia. .     Patient is accompanied by his daughter-in-law, Diana, she is pleasant and supportive.  Patient declines .      Parkinson's disease/Dementia:  Patient is past due for follow-up with neurologist.      Hypercholesterolemia/Atherosclerosis of abdominal aorta:  Patient was restarted on rosuvastatin February 2024 and is due for recheck of lipid panel.  Patient taking rosuvastatin 5 mg nightly, denies adverse effects to the rosuvastatin such as muscle aches or pains.    Chronic diastolic heart failure:  Patient and patient's family uncertain when patient last saw the cardiologist.    Hypertension:  Stable.  Severity is mild, patient is on 2 agents to control his hypertension, amlodipine 10 mg daily and lisinopril 20 mg twice a day.  Denies adverse effects to the medications, denies dry cough.      Depression:  Most of patient's depression symptoms are due to patient wanting to live in [place], however family who takes care of him lives here.  Patient currently declines pharmacologic treatment and declines counseling.    1. Little interest or pleasure in doing things: Nearly every day  2. Feeling down, depressed, or hopeless: Nearly every day  3. Trouble falling or staying asleep, or sleeping too much: Not at all  4. Feeling tired or having little energy: Nearly every day  5. Poor appetite or overeating: Nearly every day  6. Feeling bad about yourself - or that you are a failure or have let yourself or your family down: Nearly every day  7. Trouble concentrating on things, such as reading the newspaper or watching television: Nearly every day  8. Moving or speaking so slowly that  Wife Zoë notified via voicemail.    other people could have noticed. Or the opposite - being so fidgety or restless that you have been moving around a lot more than usual: Not at all  9. Thoughts that you would be better off dead, or of hurting yourself in some way: Several days  PHQ-9 TOTAL SCORE: 19  If you checked off any problems, how difficult have these problems made it for you to do your work, take care of things at home, or get along with other people?: Somewhat difficult         Alcohol use disorder, moderate, in sustained remission:  Family is able to help with patient's sobriety but was unable to do so when patient would go to [place] for months at a time.  Patient has not been drinking.          History/Other:   Fall Risk Assessment:   He has been screened for Falls and is low risk.      Cognitive Assessment:   Abnormal  What day of the week is this?: Incorrect  What month is it?: Incorrect  What year is it?: Incorrect  Recall \"Ball\": Incorrect  Recall \"Flag\": Incorrect  Recall \"Tree\": Incorrect      Functional Ability/Status:   Benjy Randolph has some abnormal functions as listed below:  He has Dressing and/or Bathing issues based on screening of functional status.  Difficulty dressing or bathing?: Yes  Bathing or Showering: Able without help  Dressing: Need some help  He has Driving difficulties based on screening of functional status. He has Meal Preparation difficulties based on screening of functional status.He has difficulties Managing Money/Bills based on screening of functional status.He has difficulties Shopping for Groceries based on screening of functional status. He has problems with Daily Activities based on screening of functional status. He has problems with Memory based on screening of functional status.       Depression Screening (PHQ-2/PHQ-9): PHQ-9 TOTAL SCORE: 19  , done 5/23/2024   Thoughts that you would be better off dead, or of hurting yourself in some way: 1    Little interest or pleasure in doing things:  3    Feeling down, depressed, or hopeless: 3    Feeling tired or having little energy: 3    Poor appetite or overeating: 3    Feeling bad about yourself - or that you are a failure or have let yourself or your family down: 3    Trouble concentrating on things, such as reading the newspaper or watching television: 3    If you checked off any problems, how difficult have these problems made it for you to do your work, take care of things at home, or get along with other people?: Somewhat difficult    Last [place] Suicide Screening on 5/23/2024 was No Risk.     5 minutes spent screening and counseling for depression    Advanced Directives:   He does NOT have a Living Will. [Do you have a living will?: No]  He does NOT have a Power of  for Health Care. [Do you have a healthcare power of ?: No]  Discussed Advance Care Planning with patient (and family/surrogate if present). Standard forms made available to patient in After Visit Summary.      Patient Active Problem List   Diagnosis    Parkinson disease (HCC)    Essential hypertension, benign    Heart murmur    Bilateral lower extremity edema    Right shoulder pain    Glenohumeral arthritis - right    Asymptomatic LV dysfunction    Aortic valve sclerosis    Hypercholesterolemia    Hyperglycemia    LVH (left ventricular hypertrophy)    Left atrial dilation    Mitral valve sclerosis    Chronic diastolic heart failure (HCC)    Primary osteoarthritis of first carpometacarpal joint of right hand    Ground glass opacity present on imaging of lung    Benign prostatic hyperplasia    Prediabetes    Retrolisthesis    History of skin cancer    Advanced age    Frail elderly    Lumbar back pain with radiculopathy affecting right lower extremity    Difficulty walking    Neoplasm of uncertain behavior of skin    Alcohol use disorder, moderate, in sustained remission (HCC)    Small vessel disease (HCC)    Abdominal hernia without obstruction and without gangrene     Hypoalbuminemia due to protein-calorie malnutrition (HCC)    Atherosclerosis of abdominal aorta (HCC)    Calculus of gallbladder without cholecystitis without obstruction    Uncontrolled hypertension    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    Current Medications:  Outpatient Medications Marked as Taking for the 24 encounter (Office Visit) with Chyna Pascual DO   Medication Sig    amLODIPine 10 MG Oral Tab Take 1 tablet (10 mg total) by mouth at bedtime.    lisinopril 20 MG Oral Tab Take 1 tablet (20 mg total) by mouth in the morning and 1 tablet (20 mg total) before bedtime.    rosuvastatin 5 MG Oral Tab Take 1 tablet (5 mg total) by mouth nightly.    [] pantoprazole 40 MG Oral Tab EC Take 1 tablet (40 mg total) by mouth daily.       Medical History:  He  has a past medical history of Abdominal pain of unknown etiology (10/17/2020), Alcoholism /alcohol abuse (2017), Anesthesia complication, Aortic sclerosis (2015), Aortic valve sclerosis (2015), Arthritis, Atrophic gastritis (2014), Basal cell carcinoma (BCC) (2020), BPH (benign prostatic hyperplasia), Cancer ([facility]), Cataract, Change in vision (2020), Chronic diastolic heart failure (HCC) (10/24/2020), Colon cancer ([facility]) (), Colon polyps (2014), COVID-19 virus infection (2020), Dermatitis (10/26/2018), EKG, abnormal (2015), Elevated hemoglobin (HCC) (2018), Esophageal reflux, Essential hypertension, benign (2015), Heart murmur, Herpes zoster without complication (10/24/2020), High cholesterol, History  of cardiac murmur, History of colon cancer (2020), History of hernia repair (2015), History of skin cancer (2016), Hypertensive urgency, Malignant neoplasm of colon (HCC) (2012), Murmur, heart, Osteoarthritis, Other and unspecified hyperlipidemia, Parkinson disease (HCC) (), Personal history of antineoplastic chemotherapy, Pneumonia due to COVID-19 virus (2020), PONV (postoperative nausea and vomiting), Postherpetic neuralgia (2020), Prurigo (2020), Retained suture (2015), S/P repair of ventral hernia (3/30/2018), SBO (small bowel obstruction) ([facility]) (3/30/2018), Shoulder injury (), Suture granuloma (10/3/2017), Tubular adenoma (2014), Uncomfortable fullness after meals, Ventral hernia without obstruction or gangrene (3/21/2018), and Ventral incisional hernia (2017).  Surgical History:  He  has a past surgical history that includes appendectomy; hernia surgery; colonoscopy (2014); Colectomy; skin surgery (Left, 4-14-15); skin surgery (2015); skin surgery (3/29/17); cataract (Bilateral); upper gi endoscopy,exam; and arthroscopy of joint unlisted (Bilateral).   Family History:  His family history includes Heart Attack in his sister.  Social History:  He  reports that he quit smoking about 9 years ago. His smoking use included cigarettes. He started smoking about 76 years ago. He has a 16.8 pack-year smoking history. He has never used smokeless tobacco. He reports current alcohol use. He reports that he does not use drugs.    Tobacco:  He smoked tobacco in the past but quit greater than 12 months ago.  Social History     Tobacco Use   Smoking Status Former    Current packs/day: 0.00    Average packs/day: 0.3 packs/day for 67.0 years (16.8 ttl pk-yrs)    Types: Cigarettes    Start date: [birth year]/10/1948    Quit date: 3/10/2015    Years since quittin.2   Smokeless Tobacco Never          CAGE Alcohol Screen:   CAGE screening score of 0 on 2024, showing  low risk of alcohol abuse.      Patient Care Team:  Chyna Pascual DO as PCP - General (Family Practice)  Yaron De La Paz MD as Consulting Physician (NEUROLOGY)  Juan Carlos Wells (Internal Medicine)  Catalino Corona MD (CARDIOLOGY)  Chau Rodrigez DO (GASTROENTEROLOGY)    Review of Systems  GENERAL: feels \"okay\"  SKIN: Due for follow-up with a dermatologist for history of skin cancer  EYES: denies blurred vision or double vision  HEENT: denies nasal congestion, sinus pain or ST  LUNGS: denies shortness of breath with exertion  CARDIOVASCULAR: denies chest pain on exertion  GI: denies abdominal pain, denies heartburn  : 2 per night nocturia, no complaint of urinary incontinence  MUSCULOSKELETAL: denies back pain  NEURO: denies headaches  PSYCH: denies thoughts of harming himself or others      Objective:   Physical Exam  General Appearance:  Frail elderly male.  Alert, cooperative, no distress, appears stated age   Head:  Normocephalic, without obvious abnormality, atraumatic   Eyes:  PERRL, conjunctiva/corneas clear, EOM's intact, both eyes   Ears:  Hearing grossly normal   Nose: No nasal discharge   Throat: MMM.  Posterior OP normal without erythema or exudate   Neck: Supple, symmetrical, trachea midline, no adenopathy, thyroid: not enlarged, symmetric, no tenderness/mass/nodules       Lungs:   Clear to auscultation anteriorly.  Low bibasilar crackles right greater than left       Heart:  Regular rate and rhythm, S1, S2 normal, 2/6 PRESLEY   Abdomen:   Soft, non-tender, no masses, no organomegaly           Extremities: Extremities normal, atraumatic, no cyanosis.  Bilateral trace lower extremity edema   Pulses: 2+ and symmetric   Skin: Possible AK's versus other   Lymph nodes: No cervical or supraclavicular adenopathy   Neurologic: Alert and awake and cooperative     /60   Pulse 75   Temp 98.4 °F (36.9 °C) (Temporal)   Resp 16   Ht 5' 6\" (1.676 m)   Wt 195 lb (88.5 kg)   SpO2 95%   BMI 31.47  kg/m²  Estimated body mass index is 31.47 kg/m² as calculated from the following:    Height as of this encounter: 5' 6\" (1.676 m).    Weight as of this encounter: 195 lb (88.5 kg).    Medicare Hearing Assessment:   Hearing Screening    Time taken: 5/27/2024  4:30 PM  Screening Method: Finger Rub  Finger Rub Result: Pass           DATA:      Component      Latest Ref Rng 12/22/2020 9/7/2022 4/26/2024   Glucose      70 - 99 mg/dL 150 (H)  132 (H)  145 (H)    Sodium      136 - 145 mmol/L 139  142  140    Potassium      3.5 - 5.1 mmol/L 3.7  3.7  3.7    Chloride      98 - 112 mmol/L 105  107  108    Carbon Dioxide, Total      21.0 - 32.0 mmol/L 30.0  28.0  25.0    ANION GAP      0 - 18 mmol/L 4  7  7    BUN      9 - 23 mg/dL 18  18  18    CREATININE      0.70 - 1.30 mg/dL 0.77  0.79  0.96    BUN/CREATININE RATIO      10.0 - 20.0  23.4 (H)      CALCIUM      8.5 - 10.1 mg/dL 9.1  9.2  9.6    CALCULATED OSMOLALITY      275 - 295 mOsm/kg 293  298 (H)  294    eGFR NON-AFR. AMERICAN      >=60  83      eGFR       >=60  96      AST (SGOT)      15 - 37 U/L 19  24  16    ALT (SGPT)      16 - 61 U/L 11 (L)  17  10 (L)    ALKALINE PHOSPHATASE      45 - 117 U/L 103  80  85    Total Bilirubin      0.1 - 2.0 mg/dL 0.3  0.3  0.5    PROTEIN, TOTAL      6.4 - 8.2 g/dL 7.4  7.0  7.3    Albumin      3.4 - 5.0 g/dL 2.8 (L)  3.3 (L)  2.9 (L)    Globulin      2.8 - 4.4 g/dL 4.6 (H)  3.7  4.4    A/G Ratio      1.0 - 2.0  0.6 (L)  0.9 (L)  0.7 (L)    EGFR      >=60 mL/min/1.73m2  86  76           Diabetes:    Lab Results   Component Value Date    A1C 6.4 (A) 09/28/2022    A1C 5.4 10/17/2020    A1C 5.8 (H) 11/08/2018     10/17/2020     11/08/2018     (H) 03/05/2018     Lab Results   Component Value Date    CHOLEST 249 (H) 01/20/2023    CHOLEST 200 (H) 10/24/2020    CHOLEST 221 (H) 11/08/2018     Lab Results   Component Value Date    HDL 45 01/20/2023    HDL 43 10/24/2020    HDL 43 11/08/2018     Lab Results    Component Value Date     (H) 01/20/2023     (H) 10/24/2020     (H) 11/08/2018     Lab Results   Component Value Date    TRIG 175 (H) 01/20/2023    TRIG 141 10/24/2020    TRIG 144 11/08/2018     Lab Results   Component Value Date    AST 16 04/26/2024    AST 19 04/02/2024    AST 13 (L) 02/26/2024     Lab Results   Component Value Date    ALT 10 (L) 04/26/2024    ALT 12 (L) 04/02/2024    ALT 12 (L) 02/26/2024       Thyroid:    Lab Results   Component Value Date    TSH 2.58 01/20/2023    TSH 3.97 10/24/2020    TSH 2.560 03/05/2018    T4F 1.1 01/20/2023    T4F 1.2 10/24/2020    T4F 1.0 03/05/2018           Assessment & Plan:   Benjy Randolph is a 89 year old male who presents for a Medicare Assessment.       Encounter Diagnoses   Name Primary?    Medicare annual wellness visit, subsequent Yes    Parkinson's disease, unspecified whether dyskinesia present, unspecified whether manifestations fluctuate (HCC)     Dementia, unspecified dementia severity, unspecified dementia type, unspecified whether behavioral, psychotic, or mood disturbance or anxiety (HCC)     Hypoalbuminemia due to protein-calorie malnutrition (HCC)     Small vessel disease (HCC)     Cerebrovascular small vessel disease     Atherosclerosis of abdominal aorta (HCC)     Chronic diastolic heart failure (HCC)     Essential hypertension, benign     Hypercholesterolemia     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 Werner, cardiologist  Dr. Devine, neurologist  Dr. English, dermatologist      1. Medicare annual wellness visit, subsequent  Patient provided info on prevention, healthcare proper , and living will.    (Neuro)  2. Parkinson's disease, unspecified whether dyskinesia present,  unspecified whether manifestations fluctuate (HCC)  3. Dementia, unspecified dementia severity, unspecified dementia type, unspecified whether behavioral, psychotic, or mood disturbance or anxiety (HCC)  [place] of Parkinson's and dementia uncertain, patient is past due for follow-up with neurologist.  Referral to neurologist, Dr. Dveine, was reprinted and patient and patient's family was encouraged to schedule appointment to see Dr. Devine.    4. Hypoalbuminemia due to protein-calorie malnutrition (HCC)  Likely multifactorial secondary to intake and secondary to chronic inflammation of chronic vascular disease.  Patient counseled on healthy diet.    (Neurovascular)  5. Small vessel disease (HCC)  6. Cerebrovascular small vessel disease  Cerebrovascular small vessel disease.  No further testing is recommended at this time.  This patient is not a candidate for aspirin therapy due to being a high risk for falls.    7. Atherosclerosis of abdominal aorta (HCC)  Patient was restarted on rosuvastatin February 2024.  Patient was encouraged to take the rosuvastatin 5 mg nightly as recommended.  Patient was also recommended to follow-up with cardiologist.  Patient to complete lipid panel, we will adjust dose of rosuvastatin in the future as indicated.    8. Chronic diastolic heart failure (HCC)  Likely compensated, patient with low bibasilar crackles on exam today as well as trace bilateral lower extremity edema.  Patient was advised to schedule appointment to see cardiologist Dr. Werner, referral to cardiologist reprinted and provided to patient and patient's family, patient advised to schedule appointment at earliest convenience.  Discussed with patient importance of keeping hypertension controlled.    9. Essential hypertension, benign  Hypertension is controlled.  Recommend continue amlodipine and lisinopril as below.  Follow-up on hypertension in 6 months.    - amLODIPine 10 MG Oral Tab; Take 1 tablet (10 mg total)  by mouth at bedtime.  Dispense: 90 tablet; Refill: 1  - lisinopril 20 MG Oral Tab; Take 1 tablet (20 mg total) by mouth in the morning and 1 tablet (20 mg total) before bedtime.  Dispense: 180 tablet; Refill: 1  - Assay, Thyroid Stim Hormone  - Free T4, (Free Thyroxine)    10. Hypercholesterolemia  Patient was restarted on rosuvastatin February 2024.  Patient was encouraged to take the rosuvastatin 5 mg nightly as recommended.  Patient to complete lipid panel, we will adjust dose of rosuvastatin in the future as indicated.    - rosuvastatin 5 MG Oral Tab; Take 1 tablet (5 mg total) by mouth nightly.  Dispense: 90 tablet; Refill: 1  - Lipid Panel    11. 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)  Patient currently declines pharmacologic treatment and declines counseling.    12. Alcohol use disorder, moderate, in sustained remission (HCC)  Patient has not been drinking.  Patient encouraged to continue with sobriety.          Orders Placed This Encounter   Procedures    Lipid Panel    Assay, Thyroid Stim Hormone    Free T4, (Free Thyroxine)       Meds & Refills for this Visit:  Requested Prescriptions     Signed Prescriptions Disp Refills    amLODIPine 10 MG Oral Tab 90 tablet 1     Sig: Take 1 tablet (10 mg total) by mouth at bedtime.    lisinopril 20 MG Oral Tab 180 tablet 1     Sig: Take 1 tablet (20 mg total) by mouth in the morning and 1 tablet (20 mg total) before bedtime.    rosuvastatin 5 MG Oral Tab 90 tablet 1     Sig: Take 1 tablet (5 mg total) by mouth nightly.           1. Medicare annual wellness visit, subsequent (Primary)  2. Parkinson's disease, unspecified whether dyskinesia present, unspecified whether manifestations fluctuate (HCC)  3. Dementia, unspecified dementia severity, unspecified dementia type, unspecified whether behavioral, psychotic, or mood disturbance or anxiety (HCC)  4. Hypoalbuminemia due to protein-calorie malnutrition  (HCC)  5. Small vessel disease (HCC)  Comments:  11/16/2022 CT of brain findings consistent with cerebrovascular small vessel disease  Overview:  11/16/2022 CT of brain findings consistent with cerebrovascular small vessel disease   6. Cerebrovascular small vessel disease  Comments:  11/16/2022 CT of brain  Overview:  11/16/2022 CT of brain  7. Atherosclerosis of abdominal aorta (HCC)  8. Chronic diastolic heart failure (HCC)  9. Essential hypertension, benign  -     amLODIPine Besylate; Take 1 tablet (10 mg total) by mouth at bedtime.  Dispense: 90 tablet; Refill: 1  -     Lisinopril; Take 1 tablet (20 mg total) by mouth in the morning and 1 tablet (20 mg total) before bedtime.  Dispense: 180 tablet; Refill: 1  -     Assay, Thyroid Stim Hormone  -     Free T4, (Free Thyroxine)  10. Hypercholesterolemia  -     Rosuvastatin Calcium; Take 1 tablet (5 mg total) by mouth nightly.  Dispense: 90 tablet; Refill: 1  -     Lipid Panel  11. 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 Alcohol use disorder, moderate, in sustained remission (HCC)    The patient indicates understanding of these issues and agrees to the plan.  Reinforced healthy diet, lifestyle, and exercise.      Return in about 3 months (around 8/23/2024) for Chronic conditions.  Sooner if needed..     Chyna Pascual DO, 5/23/2024     Supplementary Documentation:   General Health:  In the past six months, have you lost more than 10 pounds without trying?: 2 - No  Has your appetite been poor?: Yes  Type of Diet: Low Salt  How does the patient maintain a good energy level?: Other  How would you describe your daily physical activity?: None  How would you describe your current health state?: Poor  On a scale of 0 to 10, with 0 being no pain and 10 being severe pain, what is your pain level?: 0 - (None)  In the past six months, have you experienced urine leakage?: 1-Yes  At any time do you feel  concerned for the safety/well-being of yourself and/or your children, in your home or elsewhere?: No  Have you had any immunizations at another office such as Influenza, Hepatitis B, Tetanus, or Pneumococcal?: No          Benjy Randolph's SCREENING SCHEDULE   Tests on this list are recommended by your physician but may not be covered, or covered at this frequency, by your insurer.   Please check with your insurance carrier before scheduling to verify coverage.   PREVENTATIVE SERVICES FREQUENCY &  COVERAGE DETAILS LAST COMPLETION DATE   Diabetes Screening    Fasting Blood Sugar / Glucose    One screening every 12 months if never tested or if previously tested but not diagnosed with pre-diabetes   One screening every 6 months if diagnosed with pre-diabetes Lab Results   Component Value Date     (H) 04/26/2024        Cardiovascular Disease Screening    Lipid Panel  Cholesterol  Lipoprotein (HDL)  Triglycerides Covered every 5 years for all Medicare beneficiaries without apparent signs or symptoms of cardiovascular disease Lab Results   Component Value Date    CHOLEST 249 (H) 01/20/2023    HDL 45 01/20/2023     (H) 01/20/2023    TRIG 175 (H) 01/20/2023         Electrocardiogram (EKG)   Covered if needed at Welcome to Medicare, and non-screening if indicated for medical reasons 02/27/2024      Ultrasound Screening for Abdominal Aortic Aneurysm (AAA) Covered once in a lifetime for one of the following risk factors    Men who are 65-75 years old and have ever smoked    Anyone with a family history -     Colorectal Cancer Screening  Covered for ages 50-85; only need ONE of the following:    Colonoscopy   Covered every 10 years    Covered every 2 years if patient is at high risk or previous colonoscopy was abnormal 09/16/2014    No recommendations at this time    Flexible Sigmoidoscopy   Covered every 4 years -    Fecal Occult Blood Test Covered annually -   Prostate Cancer Screening     Prostate-Specific Antigen (PSA) Annually No results found for: \"PSA\"  There are no preventive care reminders to display for this patient.   Immunizations    Influenza Covered once per flu season  Please get every year -  No recommendations at this time    Pneumococcal Each vaccine ([id number] & [id number]) covered once after 65 Prevnar 13: 11/09/2017    [id number]: 07/28/2020     No recommendations at this time    Hepatitis B One screening covered for patients with certain risk factors   -  No recommendations at this time    Tetanus Toxoid Not covered by Medicare Part B unless medically necessary (cut with metal); may be covered with your pharmacy prescription benefits -    Tetanus, Diptheria and Pertusis TD and TDaP Not covered by Medicare Part B -  No recommendations at this time    Zoster Not covered by Medicare Part B; may be covered with your pharmacy  prescription benefits -  Zoster Vaccines(1 of 2) Never done     Annual Monitoring of Persistent Medications (ACE/ARB, digoxin diuretics, anticonvulsants)    Potassium Annually Lab Results   Component Value Date    K 3.7 04/26/2024         Creatinine   Annually Lab Results   Component Value Date    CREATSERUM 0.96 04/26/2024         BUN Annually Lab Results   Component Value Date    BUN 18 04/26/2024       Drug Serum Conc Annually No results found for: \"DIGOXIN\", \"DIG\", \"VALP\"

## 2024-05-27 VITALS
DIASTOLIC BLOOD PRESSURE: 60 MMHG | TEMPERATURE: 98 F | OXYGEN SATURATION: 95 % | BODY MASS INDEX: 31.34 KG/M2 | HEIGHT: 66 IN | RESPIRATION RATE: 16 BRPM | HEART RATE: 75 BPM | SYSTOLIC BLOOD PRESSURE: 138 MMHG | WEIGHT: 195 LBS

## 2024-05-27 PROBLEM — F03.90 DEMENTIA (HCC): Status: ACTIVE | Noted: 2024-05-27

## 2024-05-27 PROBLEM — I67.9 CEREBROVASCULAR SMALL VESSEL DISEASE: Status: ACTIVE | Noted: 2024-05-27

## 2024-05-29 NOTE — TELEPHONE ENCOUNTER
Pt daughter Rebecca salgado wants to know if shingles are contagious apparently patient has them since he went back to the ER today. 104

## 2024-06-11 ENCOUNTER — TELEPHONE (OUTPATIENT)
Dept: FAMILY MEDICINE CLINIC | Facility: CLINIC | Age: 89
End: 2024-06-11

## 2024-06-11 NOTE — TELEPHONE ENCOUNTER
Please ask home health nurse to verify patient is taking amlodipine 10 mg nightly and lisinopril 20 mg twice a day.    If patient is not taking antihypertensive medication as prescribed, please ask home health nurse to input instruct him to do so.  If he is taking medication as prescribed, please encourage patient to continue to take medication as prescribed and schedule sooner follow-up appointment with his cardiologist and/or with me.

## 2024-06-11 NOTE — TELEPHONE ENCOUNTER
Sweetie from [facility] called in regards to patient Benjy Randolph. Wanted to inform  Dr. Chyna Pascual about Blood pressure 170/86 no other symptoms.

## 2024-06-12 NOTE — TELEPHONE ENCOUNTER
Spoke w/ nurse Sweetie. She did confirm that the family is administering patient's medication as prescribed. Will continue to monitor BP daily, as this is the only elevated reading thus far, and if it remains elevated will follow up with PCP or cardiology.

## 2024-08-30 ENCOUNTER — PATIENT OUTREACH (OUTPATIENT)
Dept: CASE MANAGEMENT | Age: 89
End: 2024-08-30

## 2024-09-05 ENCOUNTER — PATIENT OUTREACH (OUTPATIENT)
Dept: CASE MANAGEMENT | Age: 89
End: 2024-09-05

## 2024-09-05 DIAGNOSIS — M18.11 PRIMARY OSTEOARTHRITIS OF FIRST CARPOMETACARPAL JOINT OF RIGHT HAND: ICD-10-CM

## 2024-09-05 DIAGNOSIS — I10 UNCONTROLLED HYPERTENSION: ICD-10-CM

## 2024-09-05 DIAGNOSIS — G20.A1 PARKINSON'S DISEASE, UNSPECIFIED WHETHER DYSKINESIA PRESENT, UNSPECIFIED WHETHER MANIFESTATIONS FLUCTUATE (HCC): Primary | ICD-10-CM

## 2024-09-05 RX ORDER — CARBIDOPA AND LEVODOPA 25; 100 MG/1; MG/1
1 TABLET ORAL 4 TIMES DAILY
COMMUNITY
Start: 2024-07-04

## 2024-09-05 NOTE — PROGRESS NOTES
Assessment:    I want to know a little about you.  What do you do for fun/hobbies? The patient likes to play poker with his family. The patient's granddaughter voiced to [facility] that the patient has a home in [place], the patient wants to return to [place] to his home but due to his dementia getting worse he is unable to live on his own.  Are you retired? The patient is retired.     Social support:  Do you have someone you can turn to when you are very stressed and or need help? The patient turns to his granddaughter Lizbeth, son and daughter-in-law Diana.  Do you live with someone? The patient lives with his son, daughter-in-law and granddaughter and great grandchildren.   Do you have someone you check in with or talk to on a regular basis? The patient has his son, daughter-in-law and granddaughter that are daily checking on him.   Are you the primary caretaker for anyone? The patient is not the primary caretaker of anyone. The patient's wife passed away 12 years ago.   Do you have any pets at home? The patient dislikes animals in the house.     Lets talk about stress.  How much stress do you feel you have in your life? The patient's biggest stressor is not being able to go to his home in [place].   How do you handle this stress? The patient goes to [place] a couple months every year.    Care Team:  [facility] reviewed Care Team with janine Nieves.   Specialist Added to Care Team:  -Cardiologist Dr. Silvano Werner  -Tata Zhang MD -Orthopedic surgeon    Specialist Removed from Care Team:  -Juan Carlos Wells internal medicine    Medication review:  Reviewed/Updated medications with janine Nieves  Added CARBIDOPA/LEVODOPA 25-100MG TABS   Do you take them every day on time? The patient granddaughter Lizbeth and daughter in Law Diana give the patient his medications daily.   Do you feel you can afford your medications? Most medications are covered by insurance, no issues about not being able to afford  medications reported.    Nutrition: Lets talk about eating habits  Do you eat three small meals a day? The patient eats 2 meals daily. The patient's granddaughter Lizbeth voiced to [facility] that he usually has eggs with rice and beans with 2 tortillas for breakfast and at 4 pm he has his dinner and at 7:30 he has a cup of coffee with several cookies. The patient does have vegetables with dinner daily and does snack on fruit throughout the day.   Do you feel you need to work on your eating habits? The patient's granddaughter Lizbeth voiced no need to change eating habits.  Do you eat a variety of fruits and veggies? The patient eats vegetables and fruit daily.  Any special diet you are put on?  The patient's granddaughter Lizbeth voiced to [facility] that the patient has been put on a low sodium diet.   Do you have any barriers to keeping with this diet? No barriers reported per the patient's granddaughter Lizbeth.  [facility] will be mailing out to the patient's daughter-in-law recipes for low sodium.  What are concerns or things that keep you from following this diet? No concerns reported.    Exercise:  Do you exercise? What do you do? The patient does physical therapy 2 days out of the week for 45 minutes.  How often? 2 days out of the week.   How long? 45 minutes.   Do you want to work on incorporating more movement or exercise into your daily routine? [facility] will be mailing out senior stretches for the patient to do daily for 5 minutes to improve quality of life.     Disease specific:   Do you feel you have a good understanding of your chronic conditions? The patient's granddaughter Lizbeth has a good understanding of the patient's chronic conditions.   Would you like more info on anything? [facility] will be mailing out to the patient's daughter in law information in Swedish about dementia and Parkinson disease.    Goals:  What would you say is your biggest concerns about your health? The family is concerned about the patient's  dementia and Parkinson disease getting worse.   What steps do you think you could take to work on this? The patient will schedule an appointment to establish care with new neurologist.   How can I help you achieve your goals? CCM will remind the patient of upcoming appointments and will assist the patient as needed to schedule appointments.    Personal:  Are you active on [employer]? Yes.  Do you have a preference in day/time to reach out to you monthly? Any day from 1 pm-3 pm    Next month I will follow up and we will discuss your health, health habits and goals to set for moving forward.     Follow up:  You can follow up with me any time if you have any questions. I will follow up this call with a [employer] message and/or letter with my direct contact information for you to call if you should need anything before, we talk next.     Care manager f/up: 4-6 weeks  Work on for next time: Health Maintenance and Social Determinants of Health.    Resources needed:    [facility] will be mailing out senior stretches for the patient to do daily for 5 minutes to improve quality of life.   CCM will be mailing out to the patient recipes for low sodium.      Spoke to Benjy at length about CCM, current care plan and performed CCM assessment with Benjy reviewed meds and compliance.     Interventions for following categories based on assessment    Meds: Detailed medication review with Benjy's granddaughter Lizbeth. Discussed with Lizbeth if any potential barriers are present that could prevent compliance with medication regimen. At this time, no barriers reported. The patient is stable on all medications and denies experiencing any side effects.        Time Spent This Encounter Total: 22 min medical record review, telephone communication, care plan updates where needed, and education. Provided acknowledgment and validation to patient's concerns.   Monthly Minute Total including today: 22 min    Physical assessment, complete health  history, and need for CCM established by Chyna Pascual DO.

## 2024-10-03 ENCOUNTER — PATIENT OUTREACH (OUTPATIENT)
Dept: CASE MANAGEMENT | Age: 89
End: 2024-10-03

## 2024-10-03 NOTE — PROGRESS NOTES
Attempt to reach Benjy Shankar Rodriguezindo to do CCM Monthly call for October. Left message for patient to call CCM to do monthly.    Total time -  4 min.  Total Monthly time-  4 min.   Next Care Manager Follow Up Date: 2-4 Weeks.

## 2024-10-04 NOTE — PROGRESS NOTES
Attempt to reach Benjy Shankar Randolph to do CCM Monthly call for October. Left message for patient to call CCM to do monthly.    Total time -  1 min.  Total Monthly time-  5 min.   Next Care Manager Follow Up Date: 2-4 Weeks.

## 2024-10-22 ENCOUNTER — TELEPHONE (OUTPATIENT)
Dept: FAMILY MEDICINE CLINIC | Facility: CLINIC | Age: 89
End: 2024-10-22

## 2024-10-22 NOTE — TELEPHONE ENCOUNTER
Unable to reach patient for medication adherence consult via  ID# [id number]. LVM for patient to call me back at [phone].

## 2024-11-05 ENCOUNTER — PATIENT OUTREACH (OUTPATIENT)
Dept: CASE MANAGEMENT | Age: 89
End: 2024-11-05

## 2024-11-05 DIAGNOSIS — G20.A1 PARKINSON'S DISEASE, UNSPECIFIED WHETHER DYSKINESIA PRESENT, UNSPECIFIED WHETHER MANIFESTATIONS FLUCTUATE (HCC): Primary | ICD-10-CM

## 2024-11-05 DIAGNOSIS — I10 UNCONTROLLED HYPERTENSION: ICD-10-CM

## 2024-11-05 DIAGNOSIS — I50.32 CHRONIC DIASTOLIC HEART FAILURE (HCC): ICD-10-CM

## 2024-11-05 NOTE — PROGRESS NOTES
11/5/2024  Spoke with Magui-daughter in -law (HIPAA Verified) about Benjy for CCM.      Updates to the patient's care team/ comments:   Reviewed with the patient. No changes to report.     The patient reported no changes in medications. The patient reports taking medications as instructed, no medication side effects noted.    Med Adherence  Comment: Taking as directed.    Health Maintenance: Reviewed with the patient.  Health Maintenance   Topic Date Due    Zoster Vaccines (1 of 2) Never done-Reminded    COVID-19 Vaccine (3 - 2023-24 season) 09/01/2024-Reminded    Influenza Vaccine (1) 10/01/2024-Reminded    MA Annual Health Assessment  Completed    Annual Depression Screening  Completed    Fall Risk Screening (Annual)  Completed    Pneumococcal Vaccine: 65+ Years  Completed    Colonoscopy  Discontinued       Patient current concerns:   The daughter in -law Magui informed CCM that the patient is well. The patient has a good appetite. The patient eats 3 meals daily. The patient dislikes eating vegetables. This Ccm encouraged the patient to eat vegetables. The patient eats fruit daily. The patient sleeps 7-8 hours nightly. The patient wakes up 4-5 times to urinate. The patient is able to go back to sleep without a problem. The patient doesn't snore and wakes up feeling rested. The patient takes naps throughout the day, the patient's daughter in-law reports that the patient falls asleep while watching television for 5-10 minutes. The patient's daughter in-law reports that the patient walks daily 30 minutes weather permitting. The patient is very active and motivated. The patient is able to do all activities of daily living such as eating, bathing, using the toilet, dressing and getting up from a bed or a chair independently.     The patient was seen by primary Dr. Chyna Pascual DO on 05/23/2024 for his Medicare annual Wellness visit. The patient was asked to follow up in August. This CCM scheduled the patient to  see Dr. Pascual for 01/06/2024 at 4 pm for his Annual Wellness visit. Dr. Pascual referred the patient to see cardiologist Dr. Werner. The patient saw cardiologist on 05/27/2024 and was asked to follow up in 6 months. The patient has not scheduled a follow-up appointment. This [facility] scheduled the appointment for 02/04/2025 at 10:40 am. The patient is planning a trip to [place] and is planning to return in January. Dr. Pascual referred the patient to see neurologist Dr. Devine. The patient's daughter in-law recalls scheduling an appointment but had to cancel the appointment due to the patient declining to see the neurologist. This [facility] scheduled the appointment for the patient to establish care with Dr. Devine for 03/12/2024 at 10:55 pm. The daughter-in-law agreed with the time and date of appointments.       Goals/Action Plan:    Active goal from previous outreach: Assessment done last monthly.    Patient reported progress towards goals: set goal today.               - What: Increase activity.           - Where/When/How: Daily walking  Patient Reported Barriers and Concerns: the weather is getting colder.                   - Plan for overcoming barriers: walk outdoors-weather permitted.       Care managers interventions:   CCM called the office of cardiologist Silvano Sanches MD at [phone] to schedule 6 month follow up. Ccm spoke with Christin. Appointment scheduled for Tuesday 02/04/2025 at 10:40 am   Total call time: 4 minutes    Ccm called the office of Neurologists Tracey Camp DO at [phone] to schedule a new patient appointment. CCM spoke with Patricia appointment scheduled for 03/12/2025 at 10:55 am  Total call time: 3 minutes    CCM Called the office of Dermatologist Clark Curiel MD at [phone], [facility] spoke with Mee. The patient has an outstanding balance of $1,000 and an appointment cannot be made until balance is paid in full or set up a payment plan. This CCM informed  the patient's daughter-in-law; she verbalized understanding.   Total call time: 7 minutes    Next month CCM will review the patient's health, eating habits, exercising routine and progress towards goal.     Reconciled the patient's chart using care everywhere.     Reviewed medication list, reviewed care team, and reviewed health maintenance.     CCM reminded the patient of overdue vaccines. Interested in getting vaccine, the patient will go to the pharmacy to get vaccines administered.  Immunization query completed. No updates available currently.    CCM reminded the patient that he will be due for Medicare Annual Wellness visit. The patient scheduled for 01/06/2024. The patient will keep appointment.    Provided support to the patient. Encouraged the patient to call as needed.    Appointments scheduled reviewed with the patient's daughter Magui (HIPAA Verified)    No future appointments.   Next Care Manager Follow Up Date: 4-6 Weeks.    Reason For Follow Up: review progress and barriers towards patient's goals.     Time Spent This Encounter Total: 51 min medical record review, telephone communication, care plan updates where needed, education, goals, and action plan recreation/update. Provided acknowledgment and validation to patient's concerns.   Monthly Minute Total including today: 51 min.  Physical assessment, complete health history, and need for CCM established by Chyna Pascual DO.    Friendly reminder - 2025 Benefits Open Enrollment is here!   We encourage you to review your current Medicare coverage and explore your options during this period. We have developed a Medicare Information Page on our website to serve as a resource for guidance and answers to any questions you might have.   You can access it by visiting, [email or web address]

## 2024-12-09 ENCOUNTER — PATIENT OUTREACH (OUTPATIENT)
Dept: CASE MANAGEMENT | Age: 89
End: 2024-12-09

## 2024-12-09 DIAGNOSIS — R73.03 PREDIABETES: ICD-10-CM

## 2024-12-09 DIAGNOSIS — I10 UNCONTROLLED HYPERTENSION: Primary | ICD-10-CM

## 2024-12-09 DIAGNOSIS — R73.9 HYPERGLYCEMIA: ICD-10-CM

## 2024-12-09 DIAGNOSIS — I10 ESSENTIAL HYPERTENSION, BENIGN: ICD-10-CM

## 2024-12-09 DIAGNOSIS — E78.00 HYPERCHOLESTEROLEMIA: ICD-10-CM

## 2024-12-09 NOTE — PROGRESS NOTES
Attempt to reach Benjy Shankar Rodriguezindo to do CCM Monthly call for November. Left message for patient to call CCM to do monthly.    Total time -  4 min.  Total Monthly time-  4 min.   Next Care Manager Follow Up Date: 2-4 Weeks.

## 2024-12-09 NOTE — PROGRESS NOTES
12/9/2024  Spoke with Magui-daughter in -law (HIPAA Verified) about Benjy for CCM.     Updates to the patient's care team/ comments:   Reviewed with the patient.   No changes to report.     The patient reported no changes in medications. The patient reports taking medications as instructed, no medication side effects noted.    Med Adherence  Comment: Taking as directed.    Health Maintenance: Reviewed with the patient.  Health Maintenance   Topic Date Due    Zoster Vaccines (1 of 2) Never done-Reminded    COVID-19 Vaccine (3 - 2024-25 season) 09/01/2024-Reminded    Influenza Vaccine (1) 10/01/2024-Reminded    MA Annual Health Assessment  Completed    Annual Depression Screening  Completed    Fall Risk Screening (Annual)  Completed    Pneumococcal Vaccine: 65+ Years  Completed    Colonoscopy  Discontinued       Patient current concerns:         The patient is able to do all activities of daily living such as eating, bathing, using the toilet, dressing and getting up from a bed or a chair independently.     Goals/Action Plan:     Active goal from previous outreach: Assessment done last monthly.     Patient reported progress towards goals: set goal today.               - What: Increase activity.           - Where/When/How: Daily walking  Patient Reported Barriers and Concerns: the weather is getting colder.                   - Plan for overcoming barriers: walk outdoors-weather permitted.       Care managers interventions:    CCM encouraged the patient to continue having a balanced diet/good nutrition to help maintain a good weight, to help fight off infection, and help reduce the risk of developing other chronic issues.   CCM motivated the patient to increase physical activity to help maintain independence and improve quality of life.    Next month CCM will review the patient's health, eating habits, exercising routine and progress towards goal.     Reconciled the patient's chart using care everywhere.     Reviewed  medication list, reviewed care team, and reviewed health maintenance.     CCM reminded the patient of overdue vaccines. The patient voiced interest in getting vaccine, the patient will go to the pharmacy to get vaccines administered.  Immunization query completed. No updates available currently.    Provided support to the patient. Encouraged the patient to call as needed.    Appointments reviewed with the patient.     Future Appointments   Date Time Provider Department Center   1/6/2025  4:00 PM Chyna Pascual DO [facility] [place]   3/12/2025 10:55 AM Tracey Devine DO [facility] [place]        Next Care Manager Follow Up Date: 4-6 Weeks.    Reason For Follow Up: review progress and barriers towards patient's goals.     Time Spent This Encounter Total: 30 min medical record review, telephone communication, care plan updates where needed, education, goals, and action plan recreation/update. Provided acknowledgment and validation to patient's concerns.   Monthly Minute Total including today: 34 min.  Physical assessment, complete health history, and need for CCM established by Chyna Pascual DO.    Friendly reminder - 2025 Benefits Open Enrollment is here!   We encourage you to review your current Medicare coverage and explore your options during this period. We have developed a Medicare Information Page on our website to serve as a resource for guidance and answers to any questions you might have.   You can access it by visiting, [email or web address]

## 2025-01-23 ENCOUNTER — PATIENT OUTREACH (OUTPATIENT)
Dept: CASE MANAGEMENT | Age: OVER 89
End: 2025-01-23

## 2025-01-23 ENCOUNTER — TELEPHONE (OUTPATIENT)
Dept: FAMILY MEDICINE CLINIC | Facility: CLINIC | Age: OVER 89
End: 2025-01-23

## 2025-01-23 DIAGNOSIS — R73.03 PREDIABETES: Primary | ICD-10-CM

## 2025-01-23 DIAGNOSIS — D48.5 NEOPLASM OF UNCERTAIN BEHAVIOR OF SKIN: Primary | ICD-10-CM

## 2025-01-23 DIAGNOSIS — Z85.828 HISTORY OF SKIN CANCER: ICD-10-CM

## 2025-01-23 DIAGNOSIS — I10 ESSENTIAL HYPERTENSION, BENIGN: ICD-10-CM

## 2025-01-23 DIAGNOSIS — G20.A1 PARKINSON'S DISEASE, UNSPECIFIED WHETHER DYSKINESIA PRESENT, UNSPECIFIED WHETHER MANIFESTATIONS FLUCTUATE (HCC): ICD-10-CM

## 2025-01-23 NOTE — TELEPHONE ENCOUNTER
Referral approved, thank you.  Please contact patient's daughter-in-law Magui with below contact information.    Provider Address Phone   Fran Toth MD [address] [place] [id number] [phone]

## 2025-01-23 NOTE — PROGRESS NOTES
1/23/2025  Spoke to Benjy for CCM.      Updates to the patient's care team/ comments:   Reviewed with the patient.   No changes to report.     The patient reported no changes in medications. The patient reports taking medications as instructed, no medication side effects noted.    Med Adherence  Comment: Taking as directed.    Health Maintenance: Reviewed with the patient.  Health Maintenance   Topic Date Due    Zoster Vaccines (1 of 2) Never done-Reminded    COVID-19 Vaccine (3 - 2024-25 season) 09/01/2024-Reminded    Influenza Vaccine (1) 10/01/2024-Reminded    Annual Well Visit  01/01/2025-scheduled    Annual Depression Screening  01/01/2025    Fall Risk Screening (Annual)  01/01/2025    Pneumococcal Vaccine: 50+ Years  Completed    Meningococcal B Vaccine  Aged Out    Colonoscopy  Discontinued       Patient current concerns:   The patient's daughter Magui (HIPAA Verified) informed this CCM that the patient has a skin growth on top of his head that will not heal, has bleeding and off. The patient's daughter also mentioned that the patient has another skin growth on his nose. The patient is out of the state and is to return on 01/30/2025. The patient's daughter Magui is requesting a referral for dermatologist within [facility]. This CCM sent a TE to the patient PCP requesting a referral. This CCM scheduled Supervisit Medicare Annual Wellness visit for 02/20/2025. The patient continues stay active, he continues to do his daily walking and has a good appetite.    Goals/Action Plan:     Active goal from previous outreach: Stay active  Patient reported progress towards goals: ongoing.               - What: Increase activity.           - Where/When/How: Daily walking  Patient Reported Barriers and Concerns: the weather is getting colder.                   - Plan for overcoming barriers: walk daily    Care managers interventions:    TE sent to the patient's primary requesting a referral for derm per the patient  daughter.  Next month CCM will review the patient's health, eating habits, exercising routine and progress towards goal.     Reconciled the patient's chart using care everywhere.     Updated health maintenance by documenting fall assessment, Annual Depression Screening.    Reviewed medication list, reviewed care team, and reviewed health maintenance.     CCM reminded the patient's daughter Magui that the patient was overdue for vaccines.  Immunization query completed. No updates available currently.    CCM reminded the patient's daughter that the patient is due for Medicare Annual Wellness.  The patient was scheduled for 01/06/2025 but he was still out of town, this CCM scheduled the patient for 02/20/2025.      Provided support to the patient. Encouraged the patient to call as needed.    Appointments reviewed with the patient.     Future Appointments   Date Time Provider Department Center   3/12/2025 10:55 AM Tracey Devine DO [facility] [place]        Next Care Manager Follow Up Date: 4-6 Weeks.    Reason For Follow Up: review progress and barriers towards patient's goals.     Time Spent This Encounter Total: 21 min medical record review, telephone communication, care plan updates where needed, education, goals, and action plan recreation/update. Provided acknowledgment and validation to patient's concerns.   Monthly Minute Total including today: 21 min.  Physical assessment, complete health history, and need for CCM established by Chyna Pascual DO.

## 2025-01-23 NOTE — TELEPHONE ENCOUNTER
The patient's daughter-in-law Magui (HIPAA Verified) informed this CCM that the patient has skin growth on top of his head that will not heal, has bleeding and off. The patient is out of the state and is to return on 01/30/2025. The patient's daughter-in-law Magui is requesting a referral for dermatologist within [facility]. I have pended referral for you to review. The patient has been referred Derm in the past but was unable to schedule appointment with Clark Curiel MD due to an outstanding balance.    Please call patient's daughter-in-law Magui  with any questions at [phone] (Botswanan speaking)

## 2025-01-24 NOTE — TELEPHONE ENCOUNTER
This CCM contacted the patients daughter in-law Magui and informed her that referral has been placed,

## 2025-01-24 NOTE — PROGRESS NOTES
This CCM called the office of Dermatologist Fran Booker MD at [phone]  to scheduled appointment. This CCM spoke with Dwayne; the appointment was scheduled for 02/05/2025 at 10 am at [address] [place] [id number] The patient's daughter in-law Magui she agreed with the time and date and is planning to keep the appointment.  Total call time: 10 minutes.

## 2025-02-03 ENCOUNTER — PATIENT OUTREACH (OUTPATIENT)
Dept: CASE MANAGEMENT | Age: OVER 89
End: 2025-02-03

## 2025-02-03 DIAGNOSIS — I10 UNCONTROLLED HYPERTENSION: Primary | ICD-10-CM

## 2025-02-03 DIAGNOSIS — I10 ESSENTIAL HYPERTENSION, BENIGN: ICD-10-CM

## 2025-02-03 DIAGNOSIS — R73.03 PREDIABETES: ICD-10-CM

## 2025-02-03 NOTE — PROGRESS NOTES
2/3/2025  Spoke to Diana (HIPAA verified) for Benjy for [facility].      Updates to the patient's care team/ comments:   Reviewed with the patient.   No changes to report.     The patient reported no changes in medications. The patient reports taking medications as instructed, no medication side effects noted.    Med Adherence  Comment: Taking as directed.    Health Maintenance: Reviewed with the patient.  Health Maintenance   Topic Date Due    Zoster Vaccines (1 of 2) Never done-Reminded     COVID-19 Vaccine (3 - 2024-25 season) 09/01/2024-Reminded     Influenza Vaccine (1) 10/01/2024-Reminded     Annual Well Visit  01/01/2025-scheduled 02/20/2025    Annual Depression Screening  01/01/2025    Fall Risk Screening (Annual)  01/01/2025-Completed    Pneumococcal Vaccine: 50+ Years  Completed    Meningococcal B Vaccine  Aged Out    Colonoscopy  Discontinued       Patient current concerns:     The patient's daughter in-law Diana informed this CCM that he returned from out of state on Saturday. This Ccm reviewed upcoming appointment with detail with Diana. The patient will keep all upcoming appointment. The patient's daughter in law informed this Ccm that the patient has not been taking medication for a couple of months. The patient was not under her care and the patient would forget to take medication. Diana informed CCM that as of Saturday the patient started to take medication again, the patient is not checking blood pressure at home.     Diana reported to CCM that the patient bumped the growth he has on top of his head, she reports that the growth bleed, the patient was taken to get medical attention, Diana reports that the skin growth was cleaned. The patient was asked to follow up with Dermatologist when he gets back home.  The patient will keep upcoming appointment schedule with dermatology Darrian Booker on 02/05/2025.     The patient's daughter in law reports that the patient's appetite is good and that he  can eat on his own, the patient has trouble dressing but he does it at a slow pace. The patient is unable to clean himself after the patient's toilets and refuses help from family. The patient daughter in-law reports that the patient hygiene is not good, the patient refuses to shower. The patient is able to shower but declines to shower. This CCM listened at length and provided support.     Goals/Action Plan:     Active goal from previous outreach: Stay active.       Patient reported progress towards goals: ongoing.               - What: Increase activity.           - Where/When/How: Daily walking  Patient Reported Barriers and Concerns: the weather is getting colder.                   - Plan for overcoming barriers: walk outdoors-weather permitted.       Care managers interventions:    CCM encouraged the patient to continue having a balanced diet/good nutrition to help maintain a good weight, to help fight off infection, and help reduce the risk of developing other chronic issues.   CCM motivated the patient to increase physical activity to help maintain independence and improve quality of life.   Next month CCM will review the patient's health, eating habits, exercising routine and progress towards goal.   Reconciled the patient's chart using care everywhere.     Updated health maintenance by documenting fall assessment, Annual Depression Screening.    Reviewed medication list, reviewed care team, and reviewed health maintenance.     CCM reminded the patient of overdue vaccines.  Immunization query completed. No updates available currently.    CCM reminded the patient's daughter in law that he will be due for Medicare Annual Wellness visit.  The patient is scheduled for 02/20/2025. The patient will keep appointment.     Discussed fall risk prevention with the patient's daughter.     Social determinants of health updated.     Provided support to the patient. Encouraged the patient to call as needed.    Appointments  reviewed with the patient.     Future Appointments   Date Time Provider Department Center   2/5/2025 10:00 AM Fran Toth MD [facility]   2/20/2025 11:00 AM Chyna Pascual DO [facility] [place]   3/12/2025 10:55 AM Tracey Devine DO [facility] [place]        Next Care Manager Follow Up Date: 4-6 Weeks.    Reason For Follow Up: review progress and barriers towards patient's goals.     Time Spent This Encounter Total: 27 min medical record review, telephone communication, care plan updates where needed, education, goals, and action plan recreation/update. Provided acknowledgment and validation to patient's concerns.   Monthly Minute Total including today: 27 min.  Physical assessment, complete health history, and need for CCM established by Chyna Pascual DO.

## 2025-02-03 NOTE — PROGRESS NOTES
Attempt to reach Benjykunal Randolph to do CCM Monthly call for Fbruary. Left message for patient to call CCM to do monthly.    Total time -  4 min.  Total Monthly time-  4 min.   Next Care Manager Follow Up Date: 2-4 Weeks.

## 2025-02-05 ENCOUNTER — OFFICE VISIT (OUTPATIENT)
Dept: DERMATOLOGY CLINIC | Facility: CLINIC | Age: OVER 89
End: 2025-02-05

## 2025-02-05 DIAGNOSIS — L81.4 LENTIGINES: ICD-10-CM

## 2025-02-05 DIAGNOSIS — L82.0 SEBORRHEIC KERATOSIS, INFLAMED: ICD-10-CM

## 2025-02-05 DIAGNOSIS — D49.2 NEOPLASM OF UNSPECIFIED BEHAVIOR OF BONE, SOFT TISSUE, AND SKIN: Primary | ICD-10-CM

## 2025-02-05 DIAGNOSIS — L57.0 MULTIPLE ACTINIC KERATOSES: ICD-10-CM

## 2025-02-05 PROBLEM — J41.0 SMOKERS' COUGH (HCC): Chronic | Status: ACTIVE | Noted: 2025-02-05

## 2025-02-05 PROBLEM — G20.A1 PARKINSON'S DISEASE (HCC): Status: ACTIVE | Noted: 2025-02-05

## 2025-02-05 PROCEDURE — 1159F MED LIST DOCD IN RCRD: CPT | Performed by: STUDENT IN AN ORGANIZED HEALTH CARE EDUCATION/TRAINING PROGRAM

## 2025-02-05 PROCEDURE — 11102 TANGNTL BX SKIN SINGLE LES: CPT | Performed by: STUDENT IN AN ORGANIZED HEALTH CARE EDUCATION/TRAINING PROGRAM

## 2025-02-05 PROCEDURE — 1160F RVW MEDS BY RX/DR IN RCRD: CPT | Performed by: STUDENT IN AN ORGANIZED HEALTH CARE EDUCATION/TRAINING PROGRAM

## 2025-02-05 PROCEDURE — 17003 DESTRUCT PREMALG LES 2-14: CPT | Performed by: STUDENT IN AN ORGANIZED HEALTH CARE EDUCATION/TRAINING PROGRAM

## 2025-02-05 PROCEDURE — 88305 TISSUE EXAM BY PATHOLOGIST: CPT | Performed by: STUDENT IN AN ORGANIZED HEALTH CARE EDUCATION/TRAINING PROGRAM

## 2025-02-05 PROCEDURE — 17000 DESTRUCT PREMALG LESION: CPT | Performed by: STUDENT IN AN ORGANIZED HEALTH CARE EDUCATION/TRAINING PROGRAM

## 2025-02-05 PROCEDURE — 99204 OFFICE O/P NEW MOD 45 MIN: CPT | Performed by: STUDENT IN AN ORGANIZED HEALTH CARE EDUCATION/TRAINING PROGRAM

## 2025-02-05 PROCEDURE — 11103 TANGNTL BX SKIN EA SEP/ADDL: CPT | Performed by: STUDENT IN AN ORGANIZED HEALTH CARE EDUCATION/TRAINING PROGRAM

## 2025-02-05 NOTE — PROGRESS NOTES
New Patient    Referred by: Chyna Pascual DO [NPI: [id number]]     CHIEF COMPLAINT: Lesion of concern     HISTORY OF PRESENT ILLNESS: Benjy Randolph is a 89 year old male here for evaluation of lesion of concern.    1. Growth   Location: Scalp  Duration: 4 months   Bleeding, growing, changing?: Yes: bleeding, growing  Scaly?:Yes  Itchy?:No    Current treatment: None  Past treatments: None     Personal Dermatologic History  History of skin cancer: Yes: BCC + others of unknown type  History of  atypical moles: Unknown    FAMILY HISTORY:  History of melanoma: No    Past Medical History  Past Medical History:    Abdominal pain of unknown etiology    Alcoholism /alcohol abuse    Episodic    Anesthesia complication    difficulty awakening after a surgery many years ago- needed defibrillator shock to come out per daughter,    Aortic sclerosis    Aortic valve sclerosis    Arthritis    Atrophic gastritis    chronic, inactive    Basal cell carcinoma (BCC)    BPH (benign prostatic hyperplasia)    Cancer (HCC)    skin cancer ? kind    Cataract    HAD SURGERY- ??HAS LENS IMPLANTS    Change in vision    Chronic diastolic heart failure (HCC)    Colon cancer (HCC)    Colon polyps    COVID-19 virus infection    Dermatitis    New. Right forearm    EKG, abnormal    Elevated hemoglobin    Esophageal reflux    no longer a problem/ resolved    Essential hypertension, benign    Uncontrolled     Heart murmur    Herpes zoster without complication    High cholesterol    History of cardiac murmur    History of colon cancer    History of hernia repair    First in [place], then in [place]     History of skin cancer    Hypertensive urgency    Malignant neoplasm of colon (HCC)    Murmur, heart    benign    Osteoarthritis    right shouder- surgery scheduled 06/30/16, also left shoulder    Other and unspecified hyperlipidemia    Parkinson disease (HCC)    Personal history of antineoplastic chemotherapy    2010    Pneumonia due to COVID-19  virus    PONV (postoperative nausea and vomiting)    Postherpetic neuralgia    Prurigo    Retained suture    S/P repair of ventral hernia    SBO (small bowel obstruction) (HCC)    Shoulder injury    left     Suture granuloma    Tubular adenoma    Uncomfortable fullness after meals    Ventral hernia without obstruction or gangrene    Ventral incisional hernia       REVIEW OF SYSTEMS:  Constitutional: Denies fever, chills, unintentional weight loss.   Skin as per HPI    Medications  Current Outpatient Medications   Medication Sig Dispense Refill    carbidopa-levodopa  MG Oral Tab Take 1 tablet by mouth 4 (four) times daily. (Patient not taking: Reported on 2/3/2025)      amLODIPine 10 MG Oral Tab Take 1 tablet (10 mg total) by mouth at bedtime. (Patient taking differently: Take 1 tablet (10 mg total) by mouth daily.) 90 tablet 1    lisinopril 20 MG Oral Tab Take 1 tablet (20 mg total) by mouth in the morning and 1 tablet (20 mg total) before bedtime. (Patient taking differently: Take 1 tablet (20 mg total) by mouth daily.) 180 tablet 1    rosuvastatin 5 MG Oral Tab Take 1 tablet (5 mg total) by mouth nightly. (Patient taking differently: Take 1 tablet (5 mg total) by mouth daily.) 90 tablet 1       PHYSICAL EXAM:  General: awake, alert, no acute distress  Neuropsych: appropriate mood and affect  Eyes: Sclerae anicteric, without conjunctival injection, eyelids unremarkable  Skin: Skin exam was performed today including the following: face and scalp. Pertinent findings include:   - R and L crown of scalp with 2 scaly plaques  - Scalp with rough gritty papules   - Cheeks with pink brown gritty papules   - R temple with pink papule  - Subcutaneous horn with rough gritty papule  - Stellate brown macules face       ASSESSMENT & PLAN:  Pathophysiology of diagnoses discussed with patient.  Therapeutic options reviewed. Risks, benefits, and alternatives discussed with patient. Instructions reviewed at  length.    #Lentigines  - Discussed benign appearance and provided reassurance. No treatment but observation at this time. Follow-up for concerning physical changes or new symptoms.  - Recommend sun protection with spf 30 or higher, sun protective clothing such as wide brimmed hats and long sleeves. Recommend avoiding midday sun (10 am- 3 pm).       #Inflamed seborrheic keratosis  - Reassured regarding benign nature of lesion   - Cryotherapy today. Medically necessary as lesion inflamed and irritated.    - Cryosurgery of non-malignant lesion(s)  - Risks, benefits, alternatives and personnel required for cryosurgery reviewed with patient. Pt verbalizes understanding and wishes to proceed.   - Cryosurgery performed with Liquid Nitrogen via cryostat spray gun to ISK. 1 lesion(s) treated.   - Patient tolerated well and wound care discussed.     #Multiple actinic keratoses  - Discussed premalignant etiology and possibility of transformation to SCC  - Recommended cryotherapy today   - Discussed side effects including redness, swelling, crusting, and discolortion after treatment, wound care with soap/water and vaseline   - Recommend sun protection with spf 30 or higher, sun protective clothing such as wide brimmed hats and long sleeves. Recommend avoiding midday sun (10 am- 3 pm).     - Procedure Note Cryosurgery of pre-malignant lesion(s)  Risks, benefits, alternatives, complications, and personnel required for cryosurgery reviewed with patient. Patient verbalizes understanding and wishes to proceed.   - Cryosurgery performed with Liquid Nitrogen via cryostat spray gun to Actinic Keratosis . 12 lesion(s) treated.   - Patient tolerated well and wound care discussed. Return if lesions fail to fully resolve.      #Neoplasm(s) of uncertain behavior of skin  - Shave biopsy performed today   - Will notify patient with results and arrange for appropriate definitive treatment, if indicated.      Shave of lesion to establish and  confirm diagnosis:  Photo taken: Yes    Risks, benefits, alternatives and personnel required for shave biopsy reviewed with patient. Risks discussed include, but not limited to: pain, bleeding, infection, scar, reaction to anesthetic, and recurrence/need for further treatment.  Patient and physician agree as to site(s) to be biopsied. Patient verbalizes understanding and wishes to proceed.     Site(s) prepped with alcohol and anesthetized with 1% lidocaine with epinephrine.   Shave of lesion(s) performed to the level of the dermis. Specimen(s) from A.L crown of scalp, B.R temple , C.L infraorbital cheek  D. R [facility] LM 50% ALCL and bandaging applied.   Written and verbal wound care instructions provided to patient, understanding verbalized.      Return to clinic: 3 months or sooner if something concerning arises     Fran Toth MD    By signing my name below, I, Rita HESTER MA,  attest that this documentation has been prepared under the direction and in the presence of Fran Toth MD.   Electronically Signed: Rita HESTER MA, 2/5/2025, 9:59 AM.    I, Fran Toth MD,  personally performed the services described in this documentation. All medical record entries made by the scribe were at my direction and in my presence.  I have reviewed the chart and agree that the record reflects my personal performance and is accurate and complete.  Fran Toth MD, 2/5/2025, 11:10 AM

## 2025-02-20 ENCOUNTER — OFFICE VISIT (OUTPATIENT)
Dept: FAMILY MEDICINE CLINIC | Facility: CLINIC | Age: OVER 89
End: 2025-02-20
Payer: COMMERCIAL

## 2025-02-20 VITALS
HEART RATE: 65 BPM | TEMPERATURE: 98 F | DIASTOLIC BLOOD PRESSURE: 66 MMHG | OXYGEN SATURATION: 96 % | RESPIRATION RATE: 20 BRPM | WEIGHT: 207 LBS | SYSTOLIC BLOOD PRESSURE: 136 MMHG | BODY MASS INDEX: 33.67 KG/M2 | HEIGHT: 65.57 IN

## 2025-02-20 DIAGNOSIS — E78.00 HYPERCHOLESTEROLEMIA: ICD-10-CM

## 2025-02-20 DIAGNOSIS — E66.09 CLASS 1 OBESITY DUE TO EXCESS CALORIES WITH SERIOUS COMORBIDITY AND BODY MASS INDEX (BMI) OF 33.0 TO 33.9 IN ADULT: ICD-10-CM

## 2025-02-20 DIAGNOSIS — E66.811 CLASS 1 OBESITY DUE TO EXCESS CALORIES WITH SERIOUS COMORBIDITY AND BODY MASS INDEX (BMI) OF 33.0 TO 33.9 IN ADULT: ICD-10-CM

## 2025-02-20 DIAGNOSIS — I10 ESSENTIAL HYPERTENSION, BENIGN: ICD-10-CM

## 2025-02-20 DIAGNOSIS — Z23 NEED FOR VACCINATION: ICD-10-CM

## 2025-02-20 DIAGNOSIS — Z00.00 MEDICARE ANNUAL WELLNESS VISIT, SUBSEQUENT: Primary | ICD-10-CM

## 2025-02-20 DIAGNOSIS — G20.A1 PARKINSON'S DISEASE, UNSPECIFIED WHETHER DYSKINESIA PRESENT, UNSPECIFIED WHETHER MANIFESTATIONS FLUCTUATE (HCC): ICD-10-CM

## 2025-02-20 DIAGNOSIS — R54 ADVANCED AGE: ICD-10-CM

## 2025-02-20 DIAGNOSIS — F10.21 ALCOHOL USE DISORDER, MODERATE, IN SUSTAINED REMISSION (HCC): ICD-10-CM

## 2025-02-20 PROBLEM — I87.2 VENOUS INSUFFICIENCY OF BOTH LOWER EXTREMITIES: Status: ACTIVE | Noted: 2024-05-28

## 2025-02-20 PROBLEM — I45.10 RIGHT BUNDLE BRANCH BLOCK: Status: ACTIVE | Noted: 2024-05-28

## 2025-02-20 PROBLEM — F03.90 DEMENTIA (HCC): Status: RESOLVED | Noted: 2024-05-27 | Resolved: 2025-02-20

## 2025-02-20 PROBLEM — I11.0 HYPERTENSIVE HEART DISEASE WITH CONGESTIVE HEART FAILURE (HCC): Status: ACTIVE | Noted: 2020-10-24

## 2025-02-20 PROBLEM — F32.2 CURRENT SEVERE EPISODE OF MAJOR DEPRESSIVE DISORDER WITHOUT PSYCHOTIC FEATURES WITHOUT PRIOR EPISODE (HCC): Status: RESOLVED | Noted: 2024-03-03 | Resolved: 2025-02-20

## 2025-02-20 PROCEDURE — G0008 ADMIN INFLUENZA VIRUS VAC: HCPCS | Performed by: FAMILY MEDICINE

## 2025-02-20 PROCEDURE — 3075F SYST BP GE 130 - 139MM HG: CPT | Performed by: FAMILY MEDICINE

## 2025-02-20 PROCEDURE — 96160 PT-FOCUSED HLTH RISK ASSMT: CPT | Performed by: FAMILY MEDICINE

## 2025-02-20 PROCEDURE — 3008F BODY MASS INDEX DOCD: CPT | Performed by: FAMILY MEDICINE

## 2025-02-20 PROCEDURE — 90662 IIV NO PRSV INCREASED AG IM: CPT | Performed by: FAMILY MEDICINE

## 2025-02-20 PROCEDURE — G0439 PPPS, SUBSEQ VISIT: HCPCS | Performed by: FAMILY MEDICINE

## 2025-02-20 PROCEDURE — 1160F RVW MEDS BY RX/DR IN RCRD: CPT | Performed by: FAMILY MEDICINE

## 2025-02-20 PROCEDURE — 1126F AMNT PAIN NOTED NONE PRSNT: CPT | Performed by: FAMILY MEDICINE

## 2025-02-20 PROCEDURE — 1159F MED LIST DOCD IN RCRD: CPT | Performed by: FAMILY MEDICINE

## 2025-02-20 PROCEDURE — 3078F DIAST BP <80 MM HG: CPT | Performed by: FAMILY MEDICINE

## 2025-02-20 RX ORDER — LISINOPRIL 20 MG/1
20 TABLET ORAL 2 TIMES DAILY
COMMUNITY
Start: 2025-02-20

## 2025-02-20 RX ORDER — AMLODIPINE BESYLATE 10 MG/1
10 TABLET ORAL NIGHTLY
COMMUNITY
Start: 2025-02-20

## 2025-02-20 RX ORDER — ROSUVASTATIN CALCIUM 5 MG/1
5 TABLET, COATED ORAL NIGHTLY
COMMUNITY
Start: 2025-02-20

## 2025-02-20 NOTE — PROGRESS NOTES
Subjective:   Benjy Randolph is a 89 year old male who presents for a MA AHA (Medicare Advantage Annual Health Assessment) and Subsequent Annual Wellness visit (Pt already had Initial Annual Wellness) and scheduled follow up of multiple significant but stable problems.     Patient is accompanied by his daughter-in-law Diana who is pleasant and supportive.    Patient recently saw his cardiologist Dr. Werner 2/4/2025.    Patient has appointment scheduled to see dermatologist Dr. Toth.    Patient has appointment to see neurologist Dr. Devine for Parkinson's.          History/Other:   Fall Risk Assessment:   He has been screened for Falls and is High Risk. Fall Prevention information provided to patient in After Visit Summary.    Do you feel unsteady when standing or walking?: Yes  Do you worry about falling?: No  Have you fallen in the past year?: No     Cognitive Assessment:   Abnormal  What day of the week is this?: Incorrect  What month is it?: Incorrect  What year is it?: Incorrect  Recall \"Ball\": Incorrect  Recall \"Flag\": Incorrect  Recall \"Tree\": Incorrect    Functional Ability/Status:   Benjy Randolph has some abnormal functions as listed below:  He has Driving difficulties based on screening of functional status. He has problems with Memory based on screening of functional status.       Depression Screening (PHQ):  PHQ-9 TOTAL SCORE: 4  , done 2/20/2025   Little interest or pleasure in doing things: 3    Trouble falling or staying asleep, or sleeping too much: 1     If you checked off any problems, how difficult have these problems made it for you to do your work, take care of things at home, or get along with other people?: Not difficult at all         Advanced Directives:   He does NOT have a Living Will. [Do you have a living will?: No]  He does NOT have a Power of  for Health Care. [Do you have a healthcare power of ?: No]  Discussed Advance Care Planning with  patient (and family/surrogate if present). Standard forms made available to patient in After Visit Summary.      Patient Active Problem List   Diagnosis    Parkinson disease (HCC)    Essential hypertension, benign    Heart murmur    Bilateral lower extremity edema    Right shoulder pain    Glenohumeral arthritis - right    Asymptomatic LV dysfunction    Aortic valve sclerosis    Hypercholesterolemia    Hyperglycemia    LVH (left ventricular hypertrophy)    Left atrial dilation    Mitral valve sclerosis    Primary osteoarthritis of first carpometacarpal joint of right hand    Ground glass opacity present on imaging of lung    Benign prostatic hyperplasia    Prediabetes    Retrolisthesis    History of skin cancer    Advanced age    Frail elderly    Lumbar back pain with radiculopathy affecting right lower extremity    Difficulty walking    Neoplasm of uncertain behavior of skin    Alcohol use disorder, moderate, in sustained remission (HCC)    Small vessel disease    Abdominal hernia without obstruction and without gangrene    Hypoalbuminemia due to protein-calorie malnutrition (HCC)    Atherosclerosis of abdominal aorta    Calculus of gallbladder without cholecystitis without obstruction    Mild aortic stenosis    Cellulitis of right upper extremity    Adhesive capsulitis of right shoulder    Osteoarthritis of right shoulder    Elbow arthritis    Wrist arthritis    Osteoarthritis of multiple joints    Tachycardia    Cerebrovascular small vessel disease    Right bundle branch block    Arthritis of right glenohumeral joint    Venous insufficiency of both lower extremities    Class 1 obesity due to excess calories with serious comorbidity and body mass index (BMI) of 33.0 to 33.9 in adult     Allergies:  He has No Known Allergies.    Current Medications:  Outpatient Medications Marked as Taking for the 2/20/25 encounter (Office Visit) with Chyna Pascual DO   Medication Sig    amLODIPine 10 MG Oral Tab Take 1 tablet  (10 mg total) by mouth at bedtime.    lisinopril 20 MG Oral Tab Take 1 tablet (20 mg total) by mouth in the morning and 1 tablet (20 mg total) before bedtime.    rosuvastatin 5 MG Oral Tab Take 1 tablet (5 mg total) by mouth nightly.    carbidopa-levodopa  MG Oral Tab Take 1 tablet by mouth 4 (four) times daily.       Medical History:  He  has a past medical history of Abdominal pain of unknown etiology (10/17/2020), Actinic keratosis (2025), Alcoholism /alcohol abuse (08/30/2017), Anesthesia complication, Aortic sclerosis (02/13/2015), Aortic valve sclerosis (09/29/2015), Arthritis, Atrophic gastritis (09/16/2014), Basal cell carcinoma (BCC) (08/04/2020), BPH (benign prostatic hyperplasia), Cancer ([facility]), Cataract, Change in vision (08/04/2020), Chronic diastolic heart failure ([facility]) (10/24/2020), Colon cancer ([facility]) (2010), Colon polyps (09/16/2014), COVID-19 virus infection (11/24/2020), Dermatitis (10/26/2018), EKG, abnormal (02/13/2015), Elevated hemoglobin (04/16/2018), Esophageal reflux, Essential hypertension, benign (02/05/2015), Heart murmur, Herpes zoster without complication (10/24/2020), High cholesterol, History of cardiac murmur, History of colon cancer (08/04/2020), History of hernia repair (02/11/2015), History of skin cancer (09/27/2016), Hypertensive urgency, Malignant neoplasm of colon ([facility]) (02/27/2012), Murmur, heart, Osteoarthritis, Other and unspecified hyperlipidemia, Parkinson disease ([facility]) (2014), Personal history of antineoplastic chemotherapy, Pneumonia due to COVID-19 virus (12/11/2020), PONV (postoperative nausea and vomiting), Postherpetic neuralgia (11/16/2020), Prurigo (08/04/2020), Retained suture (09/29/2015), S/P repair of ventral hernia (03/30/2018), SBO (small bowel obstruction) ([facility]) (03/30/2018), Shoulder injury (1987), Suture granuloma (10/03/2017), Tubular adenoma (09/16/2014), Uncomfortable fullness after meals, Ventral hernia without obstruction or gangrene  (2018), and Ventral incisional hernia (2017).  Surgical History:  He  has a past surgical history that includes appendectomy; hernia surgery; colonoscopy (2014); Colectomy; skin surgery (Left, 2015); skin surgery (2015); skin surgery (2017); cataract (Bilateral); upper gi endoscopy,exam; and arthroscopy of joint unlisted (Bilateral).   Family History:  His family history includes Heart Attack in his sister.  Social History:  He  reports that he quit smoking about 9 years ago. His smoking use included cigarettes. He started smoking about 77 years ago. He has a 16.8 pack-year smoking history. He has never used smokeless tobacco. He reports current alcohol use. He reports that he does not use drugs.    Tobacco:  He smoked tobacco in the past but quit greater than 12 months ago.  Social History     Tobacco Use   Smoking Status Former    Current packs/day: 0.00    Average packs/day: 0.3 packs/day for 67.0 years (16.8 ttl pk-yrs)    Types: Cigarettes    Start date: 3/10/1948    Quit date: 3/10/2015    Years since quittin.9   Smokeless Tobacco Never          CAGE Alcohol Screen:   CAGE screening score of 0 on 2025, showing low risk of alcohol abuse.      Patient Care Team:  Chyna Pascual DO as PCP - General (Family Practice)  Chau Rodrigez DO (GASTROENTEROLOGY)  Zenobia Melo MA as CCM Care Manager  Silvano Werner MD (CARDIOLOGY)  Tata Zhang MD (SURGERY, ORTHOPEDIC)    Review of Systems  GENERAL: feels well otherwise  SKIN: Positive for skin lesions and new lesion on scalp on top of head  EYES: denies blurred vision or double vision  HEENT: denies nasal congestion, sinus pain or ST  LUNGS: denies shortness of breath with exertion  CARDIOVASCULAR: denies chest pain on exertion  GI: denies abdominal pain, denies heartburn  : 1 per night nocturia, no complaint of urinary incontinence  MUSCULOSKELETAL: denies back pain  NEURO: denies headaches  PSYCH: denies  depression or anxiety      Objective:   Physical Exam  General Appearance:  Pleasant frail elderly  male.  Alert, cooperative, no distress, appears stated age   Head:  Normocephalic, without obvious abnormality, atraumatic   Eyes:  conjunctiva/corneas clear, EOM's intact, both eyes   Ears:  Hearing grossly normal to finger rub bilaterally   Nose: No nasal discharge   Throat: MMM.  Posterior OP normal without erythema or exudate   Neck: Supple, symmetrical, trachea midline, no adenopathy, thyroid: not enlarged, symmetric, no tenderness/mass/nodules       Lungs:   Clear to auscultation bilaterally, respirations unlabored       Heart:  Regular rate and rhythm, S1, S2 normal, no murmur   Abdomen:   Obese.  Soft, non-tender, no masses, no organomegaly           Extremities: Positive for trace bilateral lower extremity edema       Skin: Scalp: Pigmented lesions and pink nodular lesion of scalp.  Scattered skin lesions of head, neck, and arms consistent with actinic keratoses, pigmented lesions and dry skin.   Lymph nodes: No cervical or supraclavicular adenopathy   Neurologic: Alert and oriented     /66   Pulse 65   Temp 97.9 °F (36.6 °C) (Temporal)   Resp 20   Ht 5' 5.57\" (1.665 m)   Wt 207 lb (93.9 kg)   SpO2 96%   BMI 33.85 kg/m²  Estimated body mass index is 33.85 kg/m² as calculated from the following:    Height as of this encounter: 5' 5.57\" (1.665 m).    Weight as of this encounter: 207 lb (93.9 kg).    Medicare Hearing Assessment:   Hearing Screening    Time taken: 2/20/2025 11:59 AM  Screening Method: Finger Rub  Finger Rub Result: Pass         DATA:      Diabetes:    Lab Results   Component Value Date    A1C 6.4 (A) 09/28/2022    A1C 5.4 10/17/2020    A1C 5.8 (H) 11/08/2018     10/17/2020     11/08/2018     (H) 03/05/2018     Lab Results   Component Value Date    CHOLEST 249 (H) 01/20/2023    CHOLEST 200 (H) 10/24/2020    CHOLEST 221 (H) 11/08/2018     Lab Results    Component Value Date    HDL 45 01/20/2023    HDL 43 10/24/2020    HDL 43 11/08/2018     Lab Results   Component Value Date     (H) 01/20/2023     (H) 10/24/2020     (H) 11/08/2018     Lab Results   Component Value Date    TRIG 175 (H) 01/20/2023    TRIG 141 10/24/2020    TRIG 144 11/08/2018     Lab Results   Component Value Date    AST 16 04/26/2024    AST 19 04/02/2024    AST 13 (L) 02/26/2024     Lab Results   Component Value Date    ALT 10 (L) 04/26/2024    ALT 12 (L) 04/02/2024    ALT 12 (L) 02/26/2024 2/4/2025 assessment and plan from cardiologist Dr. Werner copied and pasted from care everywhere:    \"ASSESSMENT    1. Recommend follow-up echo Doppler in April 2025. Patient has aortic stenosis and would like to assess progression of aortic valve stenosis with his fatigue and occasional dyspnea on exertion.  2. No angina. No need for ischemic workup.  3. CMP and cholesterol profile within the month. Will print order for the patient and he will take it to primary care physician and at to PCP blood work this month.  4. Patient was told that he has heart failure but is not heart failure. He is basically asymptomatic mild LV dysfunction grade 1 which can be normal variant at the age of 89. He is not fluid overloaded and does not need any diuretics.  5. Mild lower leg puffiness from venous insufficiency. This is not heart failure and he does not need any diuretic. There are some markings around the socks but this is not heart failure but more from mild venous insufficiency with no need for loop diuretic since he is euvolemic by physical exam.  6. Continue amlodipine and lisinopril for blood pressure control and BP is controlled with this medications. No need to refill medications today. We did last time.  7. Continue rosuvastatin for hyperlipidemia. No need to refill medication today.  8. We reviewed blood work from April 2023 and no need to repeat new blood testing now. Patient has  good kidney and liver function test with normal CBC.  9. Follow-up with PCP and neurology. Patient has Parkinson and other general medicine issues.  10. Follow-up with me in May 2025.  11. Continue monitoring blood pressure at home.All was discussed with the patient and his son and daughter-in-law. Medical assistant Kaur helped with Macedonian English translation.\"          Assessment & Plan:   Benjy Randolph is a 89 year old male who presents for a Medicare Assessment.     Encounter Diagnoses   Name Primary?    Medicare annual wellness visit, subsequent Yes    Parkinson's disease, unspecified whether dyskinesia present, unspecified whether manifestations fluctuate (HCC)     Alcohol use disorder, moderate, in sustained remission (HCC)     Essential hypertension, benign     Hypercholesterolemia     Advanced age     Class 1 obesity due to excess calories with serious comorbidity and body mass index (BMI) of 33.0 to 33.9 in adult     Need for vaccination          1. Medicare annual wellness visit, subsequent  Patient provided info on prevention, healthcare power of , and living will.    2. Parkinson's disease, unspecified whether dyskinesia present, unspecified whether manifestations fluctuate (HCC)  Patient encouraged to keep upcoming appointment he has scheduled to see Dr. Devine.    3. Alcohol use disorder, moderate, in sustained remission (HCC)  Patient encouraged to continue with sobriety.    4. Essential hypertension, benign  Controlled.  Recommend continue amlodipine and lisinopril below as prescribed by cardiologist Dr. Werner.  Labs ordered and pending.    - CBC With Differential With Platelet  - Comp Metabolic Panel (14)  - Assay, Thyroid Stim Hormone  - Free T4, (Free Thyroxine)    - amLODIPine 10 MG Oral Tab; Take 1 tablet (10 mg total) by mouth at bedtime.  - lisinopril 20 MG Oral Tab; Take 1 tablet (20 mg total) by mouth in the morning and 1 tablet (20 mg total) before bedtime.    5.  Hypercholesterolemia  Status uncertain, last lipid panel was in 2023 and LDL was 172.  Labs ordered and pending.  Recommend continue rosuvastatin 5 mg nightly as prescribed by cardiologist Dr. Werner.    - Comp Metabolic Panel (14)  - Lipid Panel  - rosuvastatin 5 MG Oral Tab; Take 1 tablet (5 mg total) by mouth nightly.    6. Advanced age  Recommend routine follow-up at least every 6 months due to advanced age.    7. Class 1 obesity due to excess calories with serious comorbidity and body mass index (BMI) of 33.0 to 33.9 in adult  Healthy diet encouraged.  Exercise if able.    8. Need for vaccination  - High Dose Fluzone trivalent influenza, 65 yrs+ PFS [[id number]]              Orders Placed This Encounter   Procedures    CBC With Differential With Platelet    Comp Metabolic Panel (14)    Lipid Panel    Assay, Thyroid Stim Hormone    Free T4, (Free Thyroxine)    High Dose Fluzone trivalent influenza, 65 yrs+ PFS [[id number]]         Imaging & Consults:  INFLUENZA VAC HIGH DOSE PRSV FREE        1. Medicare annual wellness visit, subsequent (Primary)  2. Parkinson's disease, unspecified whether dyskinesia present, unspecified whether manifestations fluctuate (HCC)  3. Alcohol use disorder, moderate, in sustained remission (HCC)  4. Essential hypertension, benign  -     CBC With Differential With Platelet  -     Comp Metabolic Panel (14)  -     Assay, Thyroid Stim Hormone  -     Free T4, (Free Thyroxine)  5. Hypercholesterolemia  -     Comp Metabolic Panel (14)  -     Lipid Panel  6. Advanced age  7. Class 1 obesity due to excess calories with serious comorbidity and body mass index (BMI) of 33.0 to 33.9 in adult  Comments:  Associated with hypertension, hypercholesterolemia, and hyperglycemia  Overview:  Associated with hypertension, hypercholesterolemia, and hyperglycemia  8. Need for vaccination  -     High Dose Fluzone trivalent influenza, 65 yrs+ PFS [[id number]]    The patient indicates understanding of these issues and  agrees to the plan.  Reinforced healthy diet, lifestyle, and exercise.      Return in about 6 months (around 8/20/2025) for Chronic conditions.  Sooner if needed.     Chyna Pascual DO, 2/20/2025     Supplementary Documentation:   General Health:  In the past six months, have you lost more than 10 pounds without trying?: 2 - No  Has your appetite been poor?: No  Type of Diet: Balanced  How does the patient maintain a good energy level?: Other  How would you describe your daily physical activity?: Light  How would you describe your current health state?: Fair  How do you maintain positive mental well-being?:  (none)  On a scale of 0 to 10, with 0 being no pain and 10 being severe pain, what is your pain level?: 0 - (None)  In the past six months, have you experienced urine leakage?: 1-Yes  At any time do you feel concerned for the safety/well-being of yourself and/or your children, in your home or elsewhere?: No  Have you had any immunizations at another office such as Influenza, Hepatitis B, Tetanus, or Pneumococcal?: No    Health Maintenance   Topic Date Due    Zoster Vaccines (1 of 2) Never done    Annual Well Visit  01/01/2025    COVID-19 Vaccine (3 - 2024-25 season) 03/19/2040 (Originally 9/1/2024)    Influenza Vaccine  Completed    Annual Depression Screening  Completed    Fall Risk Screening (Annual)  Completed    Pneumococcal Vaccine: 50+ Years  Completed    Meningococcal B Vaccine  Aged Out    Colonoscopy  Discontinued

## 2025-02-21 ENCOUNTER — APPOINTMENT (OUTPATIENT)
Dept: GENERAL RADIOLOGY | Age: OVER 89
End: 2025-02-21
Attending: NURSE PRACTITIONER
Payer: MEDICARE

## 2025-02-21 ENCOUNTER — HOSPITAL ENCOUNTER (OUTPATIENT)
Age: OVER 89
Discharge: HOME | End: 2025-02-21
Attending: EMERGENCY MEDICINE
Payer: MEDICARE

## 2025-02-21 VITALS
WEIGHT: 207 LBS | TEMPERATURE: 99 F | RESPIRATION RATE: 24 BRPM | OXYGEN SATURATION: 95 % | DIASTOLIC BLOOD PRESSURE: 67 MMHG | HEART RATE: 73 BPM | HEIGHT: 65 IN | SYSTOLIC BLOOD PRESSURE: 159 MMHG | BODY MASS INDEX: 34.49 KG/M2

## 2025-02-21 DIAGNOSIS — J18.9 PNEUMONIA OF RIGHT LOWER LOBE DUE TO INFECTIOUS ORGANISM: Primary | ICD-10-CM

## 2025-02-21 LAB
POCT INFLUENZA A: NEGATIVE
POCT INFLUENZA B: NEGATIVE
SARS-COV-2 RNA RESP QL NAA+PROBE: NOT DETECTED

## 2025-02-21 PROCEDURE — 94640 AIRWAY INHALATION TREATMENT: CPT

## 2025-02-21 PROCEDURE — 93010 ELECTROCARDIOGRAM REPORT: CPT

## 2025-02-21 PROCEDURE — 99214 OFFICE O/P EST MOD 30 MIN: CPT

## 2025-02-21 PROCEDURE — 93005 ELECTROCARDIOGRAM TRACING: CPT

## 2025-02-21 PROCEDURE — 71046 X-RAY EXAM CHEST 2 VIEWS: CPT | Performed by: NURSE PRACTITIONER

## 2025-02-21 PROCEDURE — 87502 INFLUENZA DNA AMP PROBE: CPT | Performed by: NURSE PRACTITIONER

## 2025-02-21 RX ORDER — AZITHROMYCIN 250 MG/1
TABLET, FILM COATED ORAL
Qty: 6 TABLET | Refills: 0 | Status: SHIPPED | OUTPATIENT
Start: 2025-02-21 | End: 2025-02-26

## 2025-02-21 RX ORDER — IPRATROPIUM BROMIDE AND ALBUTEROL SULFATE 2.5; .5 MG/3ML; MG/3ML
3 SOLUTION RESPIRATORY (INHALATION) ONCE
Status: COMPLETED | OUTPATIENT
Start: 2025-02-21 | End: 2025-02-21

## 2025-02-21 RX ORDER — CEFDINIR 300 MG/1
300 CAPSULE ORAL EVERY 12 HOURS
Qty: 14 CAPSULE | Refills: 0 | Status: SHIPPED | OUTPATIENT
Start: 2025-02-21 | End: 2025-02-28

## 2025-02-21 NOTE — ED PROVIDER NOTES
Additional history is obtained from patient's daughter.  Patient reports that he became sick just 1 day ago.  He saw his primary care doctor yesterday.  Patient is been coughing quite a bit especially at nighttime.  He denies any chest pain or pressure.  No painful breathing.  No calf pain or swelling.  No nausea or vomiting.        On examination, this alert elderly man who appears comfortable.  Room air pulse oximetry is 94%.  He did have a coughing jag and his cough sounded very bronchospastic  Eye sclera white  Oromucosa is moist  Lungs diminished bilaterally with prolonged expiration.  There is wheeze with cough  Heart sounds are normal S1-S2 with murmur      Echocardiogram from 2023  Conclusions:   1. Left ventricle: The cavity size was normal. Wall thickness was increased.      Systolic function was normal. The estimated ejection fraction was 55-60%,      by visual assessment. No diagnostic evidence for regional wall motion      abnormalities. Doppler parameters are consistent with abnormal left      ventricular relaxation - grade 1 diastolic dysfunction.   2. Left atrium: The atrium was mildly dilated.   3. Aortic valve: The findings were consistent with mild stenosis. The peak      systolic velocity was 2.47m/sec. The mean systolic gradient was 14mm Hg.   4. Aortic root: The aortic root was 3.7cm diameter.   5. Ascending aorta: The ascending aorta was 3.5cm diameter.   Impressions:  This study is compared with previous dated 02/05/2015: Left   ventricular function remains normal. Mild aortic stenosis is noted.         Patient with stuffy nose, congestion, and cough.  Symptoms suggest viral process.  COVID and influenza as well as other viruses include the differential.    Flu swab negative  COVID test negative    An EKG was performed. I agree with computerized EKG interval interpretations. EKG shows sinus rhythm with bifascicular block similar to previous EKG from February 2024. There are no acute ST  changes to suggest acute ischemia or infarct  Ventricular rate 82 bpm. TN interval 276 ms. QRS duration 170 ms.      Chest x-ray  FINDINGS:    LUNGS:  Cardiomegaly with atherosclerotic thoracic aorta.  Persistent mild vascular congestion and interstitial prominence.  Right lower lobe increased opacity medially.  No pleural effusion or pneumothorax.     Findings discussed with patient and daughter.  I will treat with combination Omnicef and Zithromax.  I will continue albuterol nebulizer treatments at home  I discussed with daughter that I would have low threshold for patient to be reevaluated in the hospital emergency department if his symptoms do not respond to treatment or worsen in any way or if any new symptoms develop.       I provided a substantive portion of care for this patient. I personally performed the medical decision making for this encounter.

## 2025-02-21 NOTE — ED INITIAL ASSESSMENT (HPI)
Daughter reports patient has been sick since yesterday and was requesting to see the doctor.  Daughter reports patient had a doctor's appointment yesterday and was fine but once they got home he began to cough and had difficulty breathing last night.

## 2025-02-21 NOTE — ED PROVIDER NOTES
Patient Seen in: [facility]      History     Chief Complaint   Patient presents with    Cough/URI     Stated Complaint: Cold Symp    Subjective:   89-year-old male with PMH of HTN, aortic stenosis, parkinson's, CHF presents with his daughter who provides history with complaint of cough and wheezing that began 1 day ago.  Seen by PCP office yesterday, wasn't having many symptoms at that time.   Family provided a Neb tx at home that seemed to help.   Daughter states has chronic bilateral lower extremity edema.   No recent falls. No recent travel.   OTC's  Nyquil, Robitussin.     There has been no measured fever, shaking chills, complaint of chest pain, hemoptysis, headache, dizziness, nausea, vomiting or diarrhea.     The history is provided by a relative. The history is limited by a language barrier (daughter and granddaughter interpreting).             Objective:     Past Medical History:    Abdominal pain of unknown etiology    Actinic keratosis    left crown of scalp, right temple, left infraorbital cheek, right cheek    Alcoholism /alcohol abuse    Episodic    Anesthesia complication    difficulty awakening after a surgery many years ago- needed defibrillator shock to come out per daughter,    Aortic sclerosis    Aortic valve sclerosis    Arthritis    Atrophic gastritis    chronic, inactive    Basal cell carcinoma (BCC)    BPH (benign prostatic hyperplasia)    Cancer (HCC)    skin cancer ? kind    Cataract    HAD SURGERY- ??HAS LENS IMPLANTS    Change in vision    Chronic diastolic heart failure (HCC)    Colon cancer (HCC)    Colon polyps    COVID-19 virus infection    Dermatitis    New. Right forearm    EKG, abnormal    Elevated hemoglobin    Esophageal reflux    no longer a problem/ resolved    Essential hypertension, benign    Uncontrolled     Heart murmur    Herpes zoster without complication    High cholesterol    History of cardiac murmur    History of colon cancer    History of hernia  repair    [place] in [place]     History of skin cancer    Hypertensive urgency    Malignant neoplasm of colon (HCC)    Murmur, heart    benign    Osteoarthritis    right shouder- surgery scheduled 16, also left shoulder    Other and unspecified hyperlipidemia    Parkinson disease (HCC)    Personal history of antineoplastic chemotherapy        Pneumonia due to COVID-19 virus    PONV (postoperative nausea and vomiting)    Postherpetic neuralgia    Prurigo    Retained suture    S/P repair of ventral hernia    SBO (small bowel obstruction) (HCC)    Shoulder injury    left     Suture granuloma    Tubular adenoma    Uncomfortable fullness after meals    Ventral hernia without obstruction or gangrene    Ventral incisional hernia              Past Surgical History:   Procedure Laterality Date    Appendectomy      Arthroscopy of joint unlisted Bilateral     SURGERY ON BOTH SHOULDERS - UNSURE IF SCOPE OR OPEN    Cataract Bilateral     Colectomy      Colonoscopy  2014    Dr. Priscilla [place], [place]    Hernia surgery      Skin surgery Left 2015    MMS done for SCC, well dif, inv to left antihelix, AB    Skin surgery  2015    MMS - BCC to the Left Caodaism     Skin surgery  2017    MMS- BCC-micronod to right nasofacial sulcus done by AB    Upper gi endoscopy,exam                  Social History     Socioeconomic History    Marital status:    Tobacco Use    Smoking status: Former     Current packs/day: 0.00     Average packs/day: 0.3 packs/day for 67.0 years (16.8 ttl pk-yrs)     Types: Cigarettes     Start date: [birth year]/10/1948     Quit date: 3/10/2015     Years since quittin.9    Smokeless tobacco: Never   Vaping Use    Vaping status: Never Used   Substance and Sexual Activity    Alcohol use: Yes     Comment: socially    Drug use: No   Other Topics Concern     Service No    Blood Transfusions No    Caffeine Concern Yes     Comment: 2 cups of coffee daily. 1 soda weekly     Occupational Exposure No    Hobby Hazards No    Sleep Concern Yes    Stress Concern No    Weight Concern No    Special Diet No    Back Care No    Exercise Yes     Comment: Daily walking    Bike Helmet No    Seat Belt Yes    Self-Exams No    Reaction to local anesthetic No    Pt has a pacemaker No    Pt has a defibrillator No     Social Drivers of Health     Food Insecurity: No Food Insecurity (2/3/2025)    Food Insecurity     Food Insecurity: Never true   Transportation Needs: No Transportation Needs (2/3/2025)    Transportation Needs     Lack of Transportation: No   Housing Stability: Low Risk  (2/3/2025)    Housing Stability     Housing Instability: No              Review of Systems   Constitutional:  Negative for chills and fever.   HENT:  Negative for sore throat and trouble swallowing.    Respiratory:  Positive for cough and wheezing.    Cardiovascular:  Negative for chest pain.   Gastrointestinal:  Negative for abdominal pain, diarrhea and vomiting.   Neurological:  Negative for dizziness.       Positive for stated complaint: Cold Symp  Other systems are as noted in HPI.  Constitutional and vital signs reviewed.      All other systems reviewed and negative except as noted above.    Physical Exam     ED Triage Vitals [02/21/25 1153]   /67   Pulse 81   Resp 16   Temp 98.8 °F (37.1 °C)   Temp src Oral   SpO2 94 %   O2 Device None (Room air)       Current Vitals:   No data recorded      Physical Exam  Vitals and nursing note reviewed.   Constitutional:       General: He is not in acute distress.     Appearance: Normal appearance. He is not ill-appearing, toxic-appearing or diaphoretic.   HENT:      Head: Normocephalic.      Right Ear: Tympanic membrane normal.      Left Ear: Tympanic membrane normal.      Nose: Rhinorrhea present.      Mouth/Throat:      Mouth: Mucous membranes are moist.      Pharynx: Oropharynx is clear.   Eyes:      General: No scleral icterus.     Conjunctiva/sclera: Conjunctivae normal.    Cardiovascular:      Rate and Rhythm: Normal rate and regular rhythm.      Heart sounds: Murmur heard.   Pulmonary:      Effort: Pulmonary effort is normal. No respiratory distress.      Breath sounds: No stridor. Wheezing (faint expiratory on left) and rales (RLL) present. No rhonchi.   Abdominal:      General: Bowel sounds are normal.      Palpations: Abdomen is soft.      Tenderness: There is no abdominal tenderness.   Musculoskeletal:      Cervical back: Normal range of motion and neck supple.      Right lower leg: Edema (trace) present.      Left lower leg: Edema (trace) present.   Lymphadenopathy:      Cervical: No cervical adenopathy.   Skin:     General: Skin is warm and dry.      Coloration: Skin is not jaundiced.   Neurological:      Mental Status: He is alert.   Psychiatric:         Mood and Affect: Mood normal.           ED Course     Labs Reviewed   POCT FLU TEST - Normal    Narrative:     This assay is a rapid molecular in vitro test utilizing nucleic acid amplification of influenza A and B viral RNA.   RAPID SARS-COV-2 BY PCR - Normal              MDM       Medical Decision Making  89-year-old male with PMH of HTN, aortic stenosis, parkinson's, CHF presents with complaint of cough and wheezing that began 1 day ago.  Diff dx include pneumonia, CHF exacerbation, Covid/Influenza, other.   On exam, pt is non-toxic, vitals normal, afebrile. Dim lung bases, rales RLL, wheezing present.   Covid/influenza negative.   Neb treatment provided.   EKG reveals sinus rhythm with bifascicular block; no ST changes, compared to 2/2024.   CXR reveals right lower lobe increased opacity medially. Cardiomegaly with atherosclerotic thoracic aorta.  Persistent mild vascular congestion and interstitial prominence.   Will treat for pneumonia with Cefdinir and Azithromycin, Albuterol nebs, steam.   Explained he needs follow up with PCP in 1 week for reevaluation.    Strict ED precautions emphasized with family given pt's age  and comorbidities that if any symptoms worsen, persist, or new/concerning symptoms develop they need to go directly to the emergency room.   Family agreeable to plan.     Case discussed with Dr. Ed Colon; ED physician.           Amount and/or Complexity of Data Reviewed  Independent Historian: caregiver     Details: daughter  External Data Reviewed: labs, radiology, ECG and notes.  Labs: ordered.  Radiology: ordered.  ECG/medicine tests: ordered.    Risk  OTC drugs.  Prescription drug management.        Disposition and Plan     Clinical Impression:  1. Pneumonia of right lower lobe due to infectious organism         Disposition:  Discharge  2/21/2025  1:46 pm    Follow-up:  Chyna Pascual DO  [id number] [facility] [address]  [place] [address]  [phone]    Schedule an appointment as soon as possible for a visit in 1 week            Medications Prescribed:  Discharge Medication List as of 2/21/2025  1:50 PM        START taking these medications    Details   cefdinir 300 MG Oral Cap Take 1 capsule (300 mg total) by mouth every 12 (twelve) hours for 7 days., Normal, Disp-14 capsule, R-0      azithromycin (ZITHROMAX Z-FERNANDO) 250 MG Oral Tab 500 mg once followed by 250 mg daily x 4 days, Normal, Disp-6 tablet, R-0                 Supplementary Documentation:

## 2025-02-21 NOTE — DISCHARGE INSTRUCTIONS
Cefdinir: take as directed x 7 days.   Azithromycin: take as directed x 5 days.   Nebulizer every 6 hours if needed for wheezing.   - Rest and make sure drinking some fluids  - Steam twice a day (either in shower or with cup of hot water and a towel over your head)     Please follow up with your primary care doctor in 1 week for follow up.    If any persistent, worsening or new/concerning symptoms develop such as shortness of breath, weakness, chest pain, lethargy, vomiting, falls or any other concerning symptoms please go to the Emergency room.

## 2025-02-22 LAB
ATRIAL RATE: 82 BPM
P AXIS: 71 DEGREES
P-R INTERVAL: 276 MS
Q-T INTERVAL: 430 MS
QRS DURATION: 170 MS
QTC CALCULATION (BEZET): 502 MS
R AXIS: -61 DEGREES
T AXIS: 25 DEGREES
VENTRICULAR RATE: 82 BPM

## 2025-02-27 ENCOUNTER — OFFICE VISIT (OUTPATIENT)
Dept: DERMATOLOGY CLINIC | Facility: CLINIC | Age: OVER 89
End: 2025-02-27
Payer: COMMERCIAL

## 2025-02-27 DIAGNOSIS — L57.0 MULTIPLE ACTINIC KERATOSES: ICD-10-CM

## 2025-02-27 DIAGNOSIS — L82.0 SEBORRHEIC KERATOSIS, INFLAMED: ICD-10-CM

## 2025-02-27 DIAGNOSIS — D49.2 NEOPLASM OF UNSPECIFIED BEHAVIOR OF BONE, SOFT TISSUE, AND SKIN: ICD-10-CM

## 2025-02-27 DIAGNOSIS — L81.4 LENTIGINES: Primary | ICD-10-CM

## 2025-02-27 PROCEDURE — 1159F MED LIST DOCD IN RCRD: CPT | Performed by: STUDENT IN AN ORGANIZED HEALTH CARE EDUCATION/TRAINING PROGRAM

## 2025-02-27 PROCEDURE — 1160F RVW MEDS BY RX/DR IN RCRD: CPT | Performed by: STUDENT IN AN ORGANIZED HEALTH CARE EDUCATION/TRAINING PROGRAM

## 2025-02-27 PROCEDURE — 88305 TISSUE EXAM BY PATHOLOGIST: CPT | Performed by: STUDENT IN AN ORGANIZED HEALTH CARE EDUCATION/TRAINING PROGRAM

## 2025-02-27 PROCEDURE — 17004 DESTROY PREMAL LESIONS 15/>: CPT | Performed by: STUDENT IN AN ORGANIZED HEALTH CARE EDUCATION/TRAINING PROGRAM

## 2025-02-27 PROCEDURE — 11102 TANGNTL BX SKIN SINGLE LES: CPT | Performed by: STUDENT IN AN ORGANIZED HEALTH CARE EDUCATION/TRAINING PROGRAM

## 2025-02-27 PROCEDURE — 99214 OFFICE O/P EST MOD 30 MIN: CPT | Performed by: STUDENT IN AN ORGANIZED HEALTH CARE EDUCATION/TRAINING PROGRAM

## 2025-02-27 PROCEDURE — 1126F AMNT PAIN NOTED NONE PRSNT: CPT | Performed by: STUDENT IN AN ORGANIZED HEALTH CARE EDUCATION/TRAINING PROGRAM

## 2025-02-27 NOTE — PROGRESS NOTES
CHIEF COMPLAINT: Lesion of concern     HISTORY OF PRESENT ILLNESS: Benjy Randolph is a 89 year old male here for evaluation of lesion of concern.    1. Growth   Location: Scalp  Duration: 4 months   Signs and Symptoms: Pt reports improvement  Current treatment: None  Past treatments: Cryo    Personal Dermatologic History  History of skin cancer: Yes: BCC + others of unknown type  History of  atypical moles: Unknown    FAMILY HISTORY:  History of melanoma: No    Past Medical History  Past Medical History:    Abdominal pain of unknown etiology    Actinic keratosis    left crown of scalp, right temple, left infraorbital cheek, right cheek    Alcoholism /alcohol abuse    Episodic    Anesthesia complication    difficulty awakening after a surgery many years ago- needed defibrillator shock to come out per daughter,    Aortic sclerosis    Aortic valve sclerosis    Arthritis    Atrophic gastritis    chronic, inactive    Basal cell carcinoma (BCC)    BPH (benign prostatic hyperplasia)    Cancer (HCC)    skin cancer ? kind    Cataract    HAD SURGERY- ??HAS LENS IMPLANTS    Change in vision    Chronic diastolic heart failure (HCC)    Colon cancer (HCC)    Colon polyps    COVID-19 virus infection    Dermatitis    New. Right forearm    EKG, abnormal    Elevated hemoglobin    Esophageal reflux    no longer a problem/ resolved    Essential hypertension, benign    Uncontrolled     Heart murmur    Herpes zoster without complication    High cholesterol    History of cardiac murmur    History of colon cancer    History of hernia repair    [place] in [place]     History of skin cancer    Hypertensive urgency    Malignant neoplasm of colon (HCC)    Murmur, heart    benign    Osteoarthritis    right shouder- surgery scheduled 06/30/16, also left shoulder    Other and unspecified hyperlipidemia    Parkinson disease (HCC)    Personal history of antineoplastic chemotherapy    2010    Pneumonia due to COVID-19 virus     PONV (postoperative nausea and vomiting)    Postherpetic neuralgia    Prurigo    Retained suture    S/P repair of ventral hernia    SBO (small bowel obstruction) (HCC)    Shoulder injury    left     Suture granuloma    Tubular adenoma    Uncomfortable fullness after meals    Ventral hernia without obstruction or gangrene    Ventral incisional hernia       REVIEW OF SYSTEMS:  Constitutional: Denies fever, chills, unintentional weight loss.   Skin as per HPI    Medications  Current Outpatient Medications   Medication Sig Dispense Refill    cefdinir 300 MG Oral Cap Take 1 capsule (300 mg total) by mouth every 12 (twelve) hours for 7 days. 14 capsule 0    amLODIPine 10 MG Oral Tab Take 1 tablet (10 mg total) by mouth at bedtime.      lisinopril 20 MG Oral Tab Take 1 tablet (20 mg total) by mouth in the morning and 1 tablet (20 mg total) before bedtime.      rosuvastatin 5 MG Oral Tab Take 1 tablet (5 mg total) by mouth nightly.      carbidopa-levodopa  MG Oral Tab Take 1 tablet by mouth 4 (four) times daily.         PHYSICAL EXAM:  General: awake, alert, no acute distress  Neuropsych: appropriate mood and affect  Eyes: Sclerae anicteric, without conjunctival injection, eyelids unremarkable  Skin: Skin exam was performed today including the following: face and scalp. Pertinent findings include:   - R and L crown of scalp with 2 scaly plaques  - Scalp with rough gritty papules   - Cheeks with pink brown gritty papules   - R temple with pink papule  - Subcutaneous horn with rough gritty papule  - Stellate brown macules face       ASSESSMENT & PLAN:  Pathophysiology of diagnoses discussed with patient.  Therapeutic options reviewed. Risks, benefits, and alternatives discussed with patient. Instructions reviewed at length.    #Lentigines  - Discussed benign appearance and provided reassurance. No treatment but observation at this time. Follow-up for concerning physical changes or new symptoms.  - Recommend sun  protection with spf 30 or higher, sun protective clothing such as wide brimmed hats and long sleeves. Recommend avoiding midday sun (10 am- 3 pm).       #Inflamed seborrheic keratosis  - Reassured regarding benign nature of lesion   - Cryotherapy today. Medically necessary as lesion inflamed and irritated.    - Cryosurgery of non-malignant lesion(s)  - Risks, benefits, alternatives and personnel required for cryosurgery reviewed with patient. Pt verbalizes understanding and wishes to proceed.   - Cryosurgery performed with Liquid Nitrogen via cryostat spray gun to ISK. 1 lesion(s) treated.   - Patient tolerated well and wound care discussed.     #Multiple actinic keratoses  - Discussed premalignant etiology and possibility of transformation to SCC  - Recommended cryotherapy today   - Discussed side effects including redness, swelling, crusting, and discolortion after treatment, wound care with soap/water and vaseline   - Recommend sun protection with spf 30 or higher, sun protective clothing such as wide brimmed hats and long sleeves. Recommend avoiding midday sun (10 am- 3 pm).     - Procedure Note Cryosurgery of pre-malignant lesion(s)  Risks, benefits, alternatives, complications, and personnel required for cryosurgery reviewed with patient. Patient verbalizes understanding and wishes to proceed.   - Cryosurgery performed with Liquid Nitrogen via cryostat spray gun to Actinic Keratosis . 16 lesion(s) treated.   - Patient tolerated well and wound care discussed. Return if lesions fail to fully resolve.      #Neoplasm(s) of uncertain behavior of skin  - Shave biopsy performed today   - Will notify patient with results and arrange for appropriate definitive treatment, if indicated.      Shave of lesion to establish and confirm diagnosis:  Photo taken: Yes    Risks, benefits, alternatives and personnel required for shave biopsy reviewed with patient. Risks discussed include, but not limited to: pain, bleeding,  infection, scar, reaction to anesthetic, and recurrence/need for further treatment.  Patient and physician agree as to site(s) to be biopsied. Patient verbalizes understanding and wishes to proceed.     Site(s) prepped with alcohol and anesthetized with 1% lidocaine with epinephrine.   Shave of lesion(s) performed to the level of the dermis. Specimen(s) from A. L nasal sidewall B. R crown of scalp sent for pathology to r/o NMSC 50% ALCL and bandaging applied.   Written and verbal wound care instructions provided to patient, understanding verbalized.    Return to clinic: 3 months or sooner if something concerning arises     Fran Toth MD

## 2025-03-03 ENCOUNTER — TELEPHONE (OUTPATIENT)
Dept: FAMILY MEDICINE CLINIC | Facility: CLINIC | Age: OVER 89
End: 2025-03-03

## 2025-03-03 ENCOUNTER — PATIENT OUTREACH (OUTPATIENT)
Dept: CASE MANAGEMENT | Age: OVER 89
End: 2025-03-03

## 2025-03-03 ENCOUNTER — TELEPHONE (OUTPATIENT)
Dept: DERMATOLOGY CLINIC | Facility: CLINIC | Age: OVER 89
End: 2025-03-03

## 2025-03-03 DIAGNOSIS — M18.11 PRIMARY OSTEOARTHRITIS OF FIRST CARPOMETACARPAL JOINT OF RIGHT HAND: Primary | ICD-10-CM

## 2025-03-03 DIAGNOSIS — R73.03 PREDIABETES: ICD-10-CM

## 2025-03-03 DIAGNOSIS — I10 ESSENTIAL HYPERTENSION, BENIGN: ICD-10-CM

## 2025-03-03 DIAGNOSIS — G20.A1 PARKINSON'S DISEASE, UNSPECIFIED WHETHER DYSKINESIA PRESENT, UNSPECIFIED WHETHER MANIFESTATIONS FLUCTUATE (HCC): ICD-10-CM

## 2025-03-03 NOTE — TELEPHONE ENCOUNTER
----- Message from Chyna Pascual sent at 2/28/2025  5:14 PM CST -----  Please call [facility] and ask them to add a hemoglobin A1c to the existing specimen for diagnosis of hyperglycemia.  Thank you

## 2025-03-03 NOTE — PROGRESS NOTES
S/w Diana, pt's daughter, HIPAA confirmed -  used [id number]. informed of test results and all DM's recommendations. Voiced understanding. OSD info provided. Path routed to OSD.

## 2025-03-03 NOTE — PROGRESS NOTES
This CCM received a call from Lizbeth: the patient granddaughter (HIPAA Verified), requesting this CCM to schedule an appointment with [facility]: Dr.Amy [facility] MD for a Mohs micrographic surgery.  Total call time: 8 minutes    This CCM called the office of [facility]: Dr. Peggy Pizarro MD. at [phone] to scheduled appointment. This CCM spoke with Tracey; and Tracey informed this Ccm that the office staff will call the patient s daughter in -law Diana tomorrow after 3 pm to schedule the surgery, this Ccm informed Diana and she verbalized understanding.  Total call time: 7 minutes.    Total call time 15 minutes  Total Monthly time: 42minutes

## 2025-03-03 NOTE — PROGRESS NOTES
3/3/2025  Spoke to the patient's daughter in-law Diana (HIPAA verified) for [facility] for [facility].      Updates to the patient's care team/ comments:   Reviewed with the patient.   Specialist Added to Care Team: Dermatologist Fran Booker MD   The patient reported no changes in medications. The patient reports taking medications as instructed, no medication side effects noted.    Med Adherence  Comment: Taking as directed.    Health Maintenance: Reviewed with the patient's daughter in-law Ortiz.  Health Maintenance   Topic Date Due    Zoster Vaccines (1 of 2) Never done-Reminded     COVID-19 Vaccine (3 - 2024-25 season) 03/19/2040 (Originally 9/1/2024)    Influenza Vaccine  Completed    Annual Well Visit  Completed    Annual Depression Screening  Completed    Fall Risk Screening (Annual)  Completed    Pneumococcal Vaccine: 50+ Years  Completed    Meningococcal B Vaccine  Aged Out    Colonoscopy  Discontinued       Patient current concerns:   The patient's daughter in-law Ortiz reported to [facility] that the patient was seen by Dr. Pascual on 02/20/2025 for his Medicare annual Wellness visit. The patient's daughter in-law Diana voiced that the appointment went well. The patient was asked to complete labs and follow up in 6 months. The patient scheduled appointment for 08/20/25. The patient's daughter in-law Diana voiced to [facility] that the patient was seen by cardiologist Dr. Silvano Werner, the patient's daughter in-law Dinaa reported to [facility] that the appointment went well. The patient was asked to follow up in one year.     The patient's daughter in-law Ortiz reported to [facility] that the patient was seen by Dermatologist Dr. Fran Toth on 02/04/2025 and on 02/27/2025. The patient had a biopsy done; this [facility] reviewed results with the patient's daughter in-law Ortiz, and Diana verbalized understanding. The  patient's daughter in-law Ortiz requested this Ccm called the office of Dermatologist Dr. Peters  [place]: THIS ccm spoke with Izabel RN; Izabel will be giving Diana a call to give pathology results. Izabel will send pathology results to [facility]: Dr. Peggy Pizarro MD. The patient needs to be scheduled for Mohs micrographic surgery with Dr. Peggy Pizarro. This CCM called the patient's daughter in-law Diana and relayed message. Diana will call this CCM after talking to Izabel, if assistance is needed to scheduled appointment with Dr. Peggy Pizarro     Goals/Action Plan:     Active goal from previous outreach: Stay active.       Patient reported progress towards goals: ongoing.               - What: Increase activity.           - Where/When/How: Daily walking  Patient Reported Barriers and Concerns: Squamous Cell Cancer.                   - Plan for overcoming barriers: schedule appointment to get Mohs micrographic surgery      Care managers' interventions:      This CCM reviewed results with the patient's daughter in-law Diana.        This Ccm called the office of dermatologist Dr. Fran Toth at [phone] to request OSD details. This CCM spoke with Izabel RN; Izabel will be giving Diana a call to give pathology results. Izabel will send pathology results to [facility]: Dr. Peggy Pizarro MD. The patient needs to be scheduled for Mohs micrographic surgery with Dr. Peggy Pizarro. Diana will call this CCM after talking to Izabel, if assistance is needed to scheduled appointment with Dr. Peggy Pizarro   Total call time: 6 minutes.    Next month CCM will review the patient's health, eating habits, exercising routine and progress towards goal.     Reconciled the patient's chart using care everywhere.     Reviewed the medication list, updated care team, and reviewed health maintenance.     CCM reminded the patient of overdue vaccines. Immunization query completed. No updates available currently.    This CCM actively listened to the patient's concerns, provided emotional support and encouraged the patient to  reach out if the patient needed further assistance.   Appointments reviewed with the patient.     Future Appointments   Date Time Provider Department Center   3/12/2025 10:55 AM Tracey Devine DO [facility]   5/7/2025  3:45 PM [facility]   8/20/2025  1:00 PM Chyna Pascual DO [facility]        Next Care Manager Follow Up Date: 4-6 Weeks.    Reason For Follow Up: review progress and barriers towards patient's goals.     Time Spent This Encounter Total: 27 min medical record review, telephone communication, care plan updates where needed, education, goals, and action plan recreation/update. Provided acknowledgment and validation to patient's concerns.   Monthly Minute Total including today: 27 min.  Physical assessment, complete health history, and need for CCM established by Chyna Pascual DO.

## 2025-03-05 ENCOUNTER — TELEPHONE (OUTPATIENT)
Dept: DERMATOLOGY CLINIC | Facility: CLINIC | Age: OVER 89
End: 2025-03-05

## 2025-03-05 LAB
ABSOLUTE BASOPHILS: 60 CELLS/UL (ref 0–200)
ABSOLUTE EOSINOPHILS: 240 CELLS/UL (ref 15–500)
ABSOLUTE LYMPHOCYTES: 2085 CELLS/UL (ref 850–3900)
ABSOLUTE MONOCYTES: 413 CELLS/UL (ref 200–950)
ABSOLUTE NEUTROPHILS: 4703 CELLS/UL (ref 1500–7800)
ALBUMIN/GLOBULIN RATIO: 1.3 (CALC) (ref 1–2.5)
ALBUMIN: 4 G/DL (ref 3.6–5.1)
ALKALINE PHOSPHATASE: 85 U/L (ref 35–144)
ALT: 8 U/L (ref 9–46)
AST: 15 U/L (ref 10–35)
BASOPHILS: 0.8 %
BILIRUBIN, TOTAL: 0.5 MG/DL (ref 0.2–1.2)
BUN: 16 MG/DL (ref 7–25)
CALCIUM: 9.5 MG/DL (ref 8.6–10.3)
CARBON DIOXIDE: 29 MMOL/L (ref 20–32)
CHLORIDE: 101 MMOL/L (ref 98–110)
CHOL/HDLC RATIO: 3.5 (CALC)
CHOLESTEROL, TOTAL: 177 MG/DL
CREATININE: 0.89 MG/DL (ref 0.7–1.22)
EGFR: 81 ML/MIN/1.73M2
EOSINOPHILS: 3.2 %
GLOBULIN: 3.2 G/DL (CALC) (ref 1.9–3.7)
GLUCOSE: 146 MG/DL (ref 65–99)
HDL CHOLESTEROL: 50 MG/DL
HEMATOCRIT: 46.9 % (ref 38.5–50)
HEMOGLOBIN A1C: 7.4 % OF TOTAL HGB
HEMOGLOBIN: 15.8 G/DL (ref 13.2–17.1)
LDL-CHOLESTEROL: 104 MG/DL (CALC)
LYMPHOCYTES: 27.8 %
MCH: 31.6 PG (ref 27–33)
MCHC: 33.7 G/DL (ref 32–36)
MCV: 93.8 FL (ref 80–100)
MONOCYTES: 5.5 %
MPV: 10.9 FL (ref 7.5–12.5)
NEUTROPHILS: 62.7 %
NON-HDL CHOLESTEROL: 127 MG/DL (CALC)
PLATELET COUNT: 208 THOUSAND/UL (ref 140–400)
POTASSIUM: 4.1 MMOL/L (ref 3.5–5.3)
PROTEIN, TOTAL: 7.2 G/DL (ref 6.1–8.1)
RDW: 12.3 % (ref 11–15)
RED BLOOD CELL COUNT: 5 MILLION/UL (ref 4.2–5.8)
SODIUM: 140 MMOL/L (ref 135–146)
T4, FREE: 1.2 NG/DL (ref 0.8–1.8)
TRIGLYCERIDES: 136 MG/DL
TSH: 2.26 MIU/L (ref 0.4–4.5)
WHITE BLOOD CELL COUNT: 7.5 THOUSAND/UL (ref 3.8–10.8)

## 2025-03-05 NOTE — TELEPHONE ENCOUNTER
Per pt Maude in law Petra request last two biopsies and LOV chart notes faxed to [facility] Dr. Ling Jackson.

## 2025-03-05 NOTE — TELEPHONE ENCOUNTER
Patients daughter called    Asking to have path and office notes faxed to Dr. Recio at [phone]

## 2025-03-12 ENCOUNTER — OFFICE VISIT (OUTPATIENT)
Dept: NEUROLOGY | Facility: CLINIC | Age: OVER 89
End: 2025-03-12
Payer: COMMERCIAL

## 2025-03-12 VITALS
RESPIRATION RATE: 14 BRPM | DIASTOLIC BLOOD PRESSURE: 80 MMHG | SYSTOLIC BLOOD PRESSURE: 150 MMHG | WEIGHT: 207 LBS | BODY MASS INDEX: 34 KG/M2 | HEART RATE: 80 BPM

## 2025-03-12 DIAGNOSIS — G20.A1 PARKINSON'S DISEASE WITHOUT DYSKINESIA OR FLUCTUATING MANIFESTATIONS (HCC): Primary | ICD-10-CM

## 2025-03-12 PROCEDURE — 3077F SYST BP >= 140 MM HG: CPT | Performed by: OTHER

## 2025-03-12 PROCEDURE — 99213 OFFICE O/P EST LOW 20 MIN: CPT | Performed by: OTHER

## 2025-03-12 PROCEDURE — 3079F DIAST BP 80-89 MM HG: CPT | Performed by: OTHER

## 2025-03-12 RX ORDER — CEPHALEXIN 500 MG/1
500 CAPSULE ORAL 2 TIMES DAILY
COMMUNITY

## 2025-03-12 RX ORDER — CARBIDOPA AND LEVODOPA 25; 100 MG/1; MG/1
TABLET ORAL
Qty: 360 TABLET | Refills: 3 | Status: SHIPPED | OUTPATIENT
Start: 2025-03-12

## 2025-03-12 NOTE — PATIENT INSTRUCTIONS
Refill policies:    Allow 2-3 business days for refills; controlled substances may take longer.  Contact your pharmacy at least 5 days prior to running out of medication and have them send an electronic request or submit request through the “request refill” option in your [employer] account.  Refills are not addressed on weekends; covering physicians do not authorize routine medications on weekends.  No narcotics or controlled substances are refilled after noon on Fridays or by on call physicians.  By law, narcotics must be electronically prescribed.  A 30 day supply with no refills is the maximum allowed.  If your prescription is due for a refill, you may be due for a follow up appointment.  To best provide you care, patients receiving routine medications need to be seen at least once a year.  Patients receiving narcotic/controlled substance medications need to be seen at least once every 3 months.  In the event that your preferred pharmacy does not have the requested medication in stock (e.g. Backordered), it is your responsibility to find another pharmacy that has the requested medication available.  We will gladly send a new prescription to that pharmacy at your request.    Scheduling Tests:    If your physician has ordered radiology tests such as MRI or CT scans, please contact Central Scheduling at [phone] right away to schedule the test.  Once scheduled, the [facility] Centralized Referral Team will work with your insurance carrier to obtain pre-certification or prior authorization.  Depending on your insurance carrier, approval may take 3-10 days.  It is highly recommended patients assure they have received an authorization before having a test performed.  If test is done without insurance authorization, patient may be responsible for the entire amount billed.      Precertification and Prior Authorizations:  If your physician has recommended that you have a procedure or additional testing performed the [facility]  Centralized Referral Team will contact your insurance carrier to obtain pre-certification or prior authorization.    You are strongly encouraged to contact your insurance carrier to verify that your procedure/test has been approved and is a COVERED benefit.  Although the [facility] Centralized Referral Team does its due diligence, the insurance carrier gives the disclaimer that \"Although the procedure is authorized, this does not guarantee payment.\"    Ultimately the patient is responsible for payment.   Thank you for your understanding in this matter.  Paperwork Completion:  If you require FMLA or disability paperwork for your recovery, please make sure to either drop it off or have it faxed to our office at [phone]. Be sure the form has your name and date of birth on it.  The form will be faxed to our Forms Department and they will complete it for you.  There is a 25$ fee for all forms that need to be filled out.  Please be aware there is a 10-14 day turnaround time.  You will need to sign a release of information (ELIZABETH) form if your paperwork does not come with one.  You may call the Forms Department with any questions at [phone].  Their fax number is [phone].

## 2025-03-12 NOTE — PROGRESS NOTES
[facility] Progress Note    Chief Complaint   Patient presents with    Parkinson's       HPI:  As per Dr. De La Paz initial H&P from 3/6/15:  \"Benjy Chaparro is a 80 year old, with history of HTN and HL, who presents for evaluation of Parkinson's disease.  Patient is Kyrgyz speaking and vague historian; history obtained with aid of daughter.  Per daughter, he started to have shaking of right hand about 1 1/2 years ago. Daughter noted shaking when he was trying to do things like trying to put sugar into coffee. She has also noted this occurs when he is at rest. In regards to his walking, he is doing well and does not have to use cane or walker.   He was told he had Parkinson's disease and has been on Sinemet 10/100 mg for the past 6 months, with no significant change. He is supposed to be taking 3 times a day but only takes it twice as he does not want to take it more often   He denies headaches and denies similar symptoms in the family. He has been treated for colon cancer in the past but denies any recurrence.   Otherwise, patient denies any recent weight change, fevers, chills, nausea, double vision/ blurry vision / loss of vision, chest pain, palpitations, shortness of breath, rashes, joint pains, bowel / bladder incontinence or mood issues. \"          Interim   Parkinsons x 10 years. Parkinson's stable per his son Marek who is with him today. Needs help with dressing at times due to tremor. Denies falls. Tolerating meds well. Has tremor when buttoning shirt. Balance is good. Takes Sinemet 7a, 11a, 3p, 7p.             Past Medical History:    Abdominal pain of unknown etiology    Actinic keratosis    left crown of scalp, right temple, left infraorbital cheek, right cheek    Alcoholism /alcohol abuse    Episodic    Anesthesia complication    difficulty awakening after a surgery many years ago- needed defibrillator shock to come out per daughter,    Aortic sclerosis    Aortic valve sclerosis     Arthritis    Atrophic gastritis    chronic, inactive    Basal cell carcinoma (BCC)    BPH (benign prostatic hyperplasia)    Cancer (HCC)    skin cancer ? kind    Cataract    HAD SURGERY- ??HAS LENS IMPLANTS    Change in vision    Chronic diastolic heart failure (HCC)    Colon cancer (HCC)    Colon polyps    COVID-19 virus infection    Dermatitis    New. Right forearm    EKG, abnormal    Elevated hemoglobin    Esophageal reflux    no longer a problem/ resolved    Essential hypertension, benign    Uncontrolled     Heart murmur    Herpes zoster without complication    High cholesterol    History of cardiac murmur    History of colon cancer    History of hernia repair    [place] in [place]     History of skin cancer    Hypertensive urgency    Malignant neoplasm of colon (HCC)    Murmur, heart    benign    Osteoarthritis    right shouder- surgery scheduled 06/30/16, also left shoulder    Other and unspecified hyperlipidemia    Parkinson disease (HCC)    Personal history of antineoplastic chemotherapy    2010    Pneumonia due to COVID-19 virus    PONV (postoperative nausea and vomiting)    Postherpetic neuralgia    Prurigo    Retained suture    S/P repair of ventral hernia    SBO (small bowel obstruction) (HCC)    SCC (squamous cell carcinoma)    right corwn of scalp    SCC (squamous cell carcinoma)    right crown of scalp    Shoulder injury    left     Suture granuloma    Tubular adenoma    Uncomfortable fullness after meals    Ventral hernia without obstruction or gangrene    Ventral incisional hernia     Past Surgical History:   Procedure Laterality Date    Appendectomy      Arthroscopy of joint unlisted Bilateral     SURGERY ON BOTH SHOULDERS - UNSURE IF SCOPE OR OPEN    Cataract Bilateral     Colectomy      Colonoscopy  09/16/2014    Dr. Priscilla [place], [place]    Hernia surgery      Skin surgery Left 04/14/2015    MMS done for SCC, well dif, inv to left antihelix, AB    Skin surgery  05/04/2015    MMS - BCC  to the Left Baptism     Skin surgery  03/29/2017    MMS- BCC-micronod to right nasofacial sulcus done by AB    Upper gi endoscopy,exam       Family History   Problem Relation Age of Onset    Heart Attack Sister     Cancer Neg     Heart Disease Neg     Stroke Neg      Social History     Socioeconomic History    Marital status:    Tobacco Use    Smoking status: Former     Current packs/day: 0.00     Average packs/day: 0.3 packs/day for 67.0 years (16.8 ttl pk-yrs)     Types: Cigarettes     Start date: 3/10/1948     Quit date: 3/10/2015     Years since quitting: 10.0    Smokeless tobacco: Never   Vaping Use    Vaping status: Never Used   Substance and Sexual Activity    Alcohol use: Yes     Comment: socially    Drug use: No   Other Topics Concern     Service No    Blood Transfusions No    Caffeine Concern Yes     Comment: 2 cups of coffee daily. 1 soda weekly    Occupational Exposure No    Hobby Hazards No    Sleep Concern Yes    Stress Concern No    Weight Concern No    Special Diet No    Back Care No    Exercise Yes     Comment: Daily walking    Bike Helmet No    Seat Belt Yes    Self-Exams No    Reaction to local anesthetic No    Pt has a pacemaker No    Pt has a defibrillator No       No Known Allergies      Current Outpatient Medications:     cephALEXin 500 MG Oral Cap, Take 1 capsule (500 mg total) by mouth 2 (two) times daily. One bid x10 days, Disp: , Rfl:     lisinopril 20 MG Oral Tab, Take 1 tablet (20 mg total) by mouth in the morning and 1 tablet (20 mg total) before bedtime., Disp: , Rfl:     rosuvastatin 5 MG Oral Tab, Take 1 tablet (5 mg total) by mouth nightly., Disp: , Rfl:     carbidopa-levodopa  MG Oral Tab, Take 1 tablet by mouth 4 (four) times daily., Disp: , Rfl:     amLODIPine 10 MG Oral Tab, Take 1 tablet (10 mg total) by mouth at bedtime. (Patient not taking: Reported on 3/12/2025), Disp: , Rfl:     Review of Systems:  No chest pain or palpitations currently; otherwise as  noted in HPI.    Exam:  /80 (BP Location: Right arm, Patient Position: Sitting, Cuff Size: adult)   Pulse 80   Resp 14   Wt 207 lb (93.9 kg)   BMI 34.45 kg/m²   Estimated body mass index is 34.45 kg/m² as calculated from the following:    Height as of 2/21/25: 65\".    Weight as of this encounter: 207 lb (93.9 kg).    Gen: well developed, well nourished, no acute distress  HEENT: normocephalic  Heart; normal S1/S2, regular rate and rhythm  Lungs: clear to auscultation bilaterally  Extremities: no edema, peripheral pulses intact    Neuro:  Mental status:  Orientation: Alert and oriented to person but not able to give date    CN: PERRL, EOMI with no nystagmus, VFF, smile symmetric, sensation intact, tongue and palate midline, SCM intact, otherwise, CN 2-12 intact  Motor: 5/5 strength throughout,     +finger tapping ok, +cognwheel in RUE, RUE tremor while walking and sitting, takes 3 steps to turn, negative pull test      Sensory: intact to light touch throughout   Coord: FNF with mild intention tremor bilaterally; R worse than left  Romberg: absent  Gait:  Gait overall improved independent and able to turn and walk down [place] easily; not stiff on R side or dragging R leg     Labs:  No new labs    Imaging:  New    Prior as noted below      CT BRAIN OR HEAD (53962)    Result Date: 11/16/2022  CONCLUSION:  1. There is mild atrophy and chronic small vessel ischemic change.  There is no evidence of acute ischemia, hemorrhage or mass. 2. There is sinus inflammatory disease involving maxillary sinuses.   Dictated by (CST): Naveen Jamison MD on 11/16/2022 at 9:51 AM     Finalized by (CST): Naveen Jamsion MD on 11/16/2022 at 9:53 AM                Encounter Diagnosis   Name Primary?    Parkinson's disease without dyskinesia or fluctuating manifestations (HCC) Yes       Parkinson's disease   Continue Sinemet 25/100mg 1 tab at 7a, 11a, 3pm, 7pm  Tremor predominant    Alzheimer's dementia, mild  Donepezil 5mg had  SE    Elevated A1c  Follow up with Dr. Pascual    Requested Prescriptions     Signed Prescriptions Disp Refills    carbidopa-levodopa  MG Oral Tab 360 tablet 3     Sig: Take 1 tablet 4 times daily, at around 7 AM, 11 AM 3 PM, 7 PM         We discussed in depth regarding the diagnosis, prognosis, treatment.  The patient was given ample opportunity to ask questions. All questions and concerns were addressed.     Jennifer Devine DO  Neuromuscular and General Neurology  [facility]

## 2025-03-12 NOTE — PROGRESS NOTES
. Parkinsons x 8 years. Parkinson's stable per his son Marek who is with him today. Needs help with dressing at times due to tremor. Denies falls. Tolerating meds well.

## 2025-04-07 ENCOUNTER — PATIENT OUTREACH (OUTPATIENT)
Dept: CASE MANAGEMENT | Age: OVER 89
End: 2025-04-07

## 2025-04-07 RX ORDER — KETOCONAZOLE 20 MG/G
CREAM TOPICAL DAILY
COMMUNITY
Start: 2025-03-11

## 2025-04-07 NOTE — PROGRESS NOTES
4/7/2025  Spoke to the patient's daughter in-law Diana (HIPAA verified) for Benjy for CCM.     Updates to the patient's care team/ comments:   Reviewed with the patient.   Specialist Added to Care Team:  Dermatologist Clark Curiel    The patient reported changes in medications. The patient reports taking medications as instructed, no medication side effects noted.  Added ketoconazole 2 % External Cream   Completed cephALEXin 500 MG Oral Cap     Med Adherence  Comment: Taking as directed.    Health Maintenance: Reviewed with the patient.  Health Maintenance   Topic Date Due    Zoster Vaccines (1 of 2) Never done-Interested     HTN: BP Follow-Up  04/12/2025-Scheduled 04/24/2025    COVID-19 Vaccine (3 - 2024-25 season) 03/19/2040 (Originally 9/1/2024)    Influenza Vaccine  Completed    Annual Well Visit  Completed    Annual Depression Screening  Completed    Fall Risk Screening (Annual)  Completed    Pneumococcal Vaccine: 50+ Years  Completed    Meningococcal B Vaccine  Aged Out    Colonoscopy  Discontinued       Patient current concerns:     The patient was seen by Dermatologist Clark Curiel, for the treatment of the Squamous Cell Cancer.  The patient's daughter in-law informed this Ccm that the patient wound has healed and is recovering. The patient is scheduled to follow up with Dermatologist Fran Booker on 05/07/2025. The patient's daughter-in-law is planning to keep the appointment for the patient.     The patient's daughter-in-law reports that the patient is walking several times a week outside, the patient's daughter-in-law will push the patient to walk daily for 20 minutes. The patient's appetite is good, and the patient is sleeping good per the patient's daughter In-law.     The patient was seen by neurologist Dr. Devine on 03/12/2025, the patient's daughter in-law reports the patient is taking carbidopa-levodopa  MG Oral Tab, no side effects to report. The patient was asked  to follow up with pcp Dr. Pascual for elevated hgb A1c. This CCM scheduled the patient for 04/24/2025 to follow up with Dr. Pascual to review lab results done in February. The patient is scheduled to see. Neurologist in August.       Goals/Action Plan:    Active goal from previous outreach: Stay active.     Patient reported progress towards goals: ongoing.                - What: Increase activity.            - Where/When/How: Increase activity.   Patient Reported Barriers and Concerns: Squamous Cell Cancer.                    - Plan for overcoming barriers: keep appointment with Dermatologist Dr. Toth       Care managers interventions:    This CCM scheduled the patient a follow up appointment for 04/24/2025 at 2 pm with PCP.  CCM encouraged the patient to continue having a balanced diet/good nutrition to help maintain a good weight, to help fight off infection, and help reduce the risk of developing other chronic issues.   CCM motivated the patient to increase physical activity to help maintain independence and improve quality of life.  CCM informed the patient that stretching daily, daily walking a couple times a week. Activity improves strength, flexibility, and balance. CCM encouraged the patient to stay active.  Next month CCM will review the patient's health, eating habits, exercising routine and progress towards goal.     Reconciled the patient's chart using care everywhere.     Updated health maintenance by documenting fall assessment, Annual Depression Screening.    Reviewed the medication list, reviewed care team, and reviewed health maintenance.     CCM reminded the patient of overdue vaccines. The patient voiced interest in getting vaccine, the patient will go to the pharmacy to get vaccines administered.  Immunization query completed. No updates available currently.    This CCM actively listened to the patient's concerns, provided emotional support and encouraged the patient to reach out if the  patient needed further assistance.   Appointments reviewed with the patient.     Future Appointments   Date Time Provider Department Center   5/7/2025  3:45 PM [facility]   8/20/2025  1:00 PM Chyna Pascual DO [facility]        Next Care Manager Follow Up Date: 4-6 Weeks.    Reason For Follow Up: review progress and barriers towards patient's goals.     Time Spent This Encounter Total: 22 min medical record review, telephone communication, care plan updates where needed, education, goals, and action plan recreation/update. Provided acknowledgment and validation to patient's concerns.   Monthly Minute Total including today: 22 min.  Physical assessment, complete health history, and need for CCM established by Chyna Pascual DO.

## 2025-04-10 ENCOUNTER — TELEPHONE (OUTPATIENT)
Dept: FAMILY MEDICINE CLINIC | Facility: CLINIC | Age: OVER 89
End: 2025-04-10

## 2025-04-10 DIAGNOSIS — G20.A1 PARKINSON'S DISEASE, UNSPECIFIED WHETHER DYSKINESIA PRESENT, UNSPECIFIED WHETHER MANIFESTATIONS FLUCTUATE (HCC): Primary | ICD-10-CM

## 2025-04-10 DIAGNOSIS — I35.0 MILD AORTIC STENOSIS: ICD-10-CM

## 2025-04-10 DIAGNOSIS — I10 ESSENTIAL HYPERTENSION, BENIGN: ICD-10-CM

## 2025-04-10 DIAGNOSIS — M15.9 OSTEOARTHRITIS OF MULTIPLE JOINTS, UNSPECIFIED OSTEOARTHRITIS TYPE: ICD-10-CM

## 2025-04-10 DIAGNOSIS — R54 FRAIL ELDERLY: ICD-10-CM

## 2025-04-10 DIAGNOSIS — R26.2 DIFFICULTY WALKING: ICD-10-CM

## 2025-04-10 DIAGNOSIS — R53.1 GENERALIZED WEAKNESS: ICD-10-CM

## 2025-04-10 NOTE — TELEPHONE ENCOUNTER
Order placed for home health physical therapist to evaluate and treat.    Please call patient's granddaughter, Lizbeth, at [phone].        Encounter Diagnoses   Name Primary?    Parkinson's disease, unspecified whether dyskinesia present, unspecified whether manifestations fluctuate (HCC) Yes    Generalized weakness     Essential hypertension, benign     Mild aortic stenosis     Osteoarthritis of multiple joints, unspecified osteoarthritis type     Difficulty walking     Frail elderly        Imaging & Consults:  HOME HEALTH (EXT)

## 2025-04-24 ENCOUNTER — TELEPHONE (OUTPATIENT)
Dept: FAMILY MEDICINE CLINIC | Facility: CLINIC | Age: OVER 89
End: 2025-04-24

## 2025-04-30 NOTE — TELEPHONE ENCOUNTER
Benjy Randolph was scheduled for an appt on 4/24/25 with a visit reason of \"lab results hgba 1c elevated and HTN follow-up\".    Letter/MyChart message sent to pt notifying of missed appointment with $50 no show fee.

## 2025-05-08 ENCOUNTER — PATIENT OUTREACH (OUTPATIENT)
Dept: CASE MANAGEMENT | Age: OVER 89
End: 2025-05-08

## 2025-05-08 NOTE — PROGRESS NOTES
Attempt to reach Benjykunal Randolph to do CCM Monthly call. Left message for patient to call CCM to do monthly.    Total time -  4 min.  Total Monthly time-  4 min.   Next Care Manager Follow Up Date: 2-4 Weeks.

## 2025-05-30 ENCOUNTER — PATIENT OUTREACH (OUTPATIENT)
Dept: CASE MANAGEMENT | Age: OVER 89
End: 2025-05-30

## 2025-05-30 DIAGNOSIS — I10 ESSENTIAL HYPERTENSION, BENIGN: Primary | ICD-10-CM

## 2025-05-30 NOTE — PROGRESS NOTES
5/30/2025  Spoke to Benjy for CCM.      Updates to the patient's care team/ comments:   Reviewed with the patient. No changes to report.     The patient reported no changes in medications. The patient reports taking medications as instructed, no medication side effects noted.    Med Adherence  Comment: Taking as directed.    Health Maintenance: Reviewed with the patient's daughter in -law.  Health Maintenance   Topic Date Due    Zoster Vaccines (1 of 2) Never done-Reviewed    HTN: BP Follow-Up  04/12/2025-scheduled 08/20/2025    COVID-19 Vaccine (3 - 2024-25 season) 03/19/2040 (Originally 9/1/2024)    Influenza Vaccine  Completed    Annual Well Visit  Completed    Annual Depression Screening  Completed    Fall Risk Screening (Annual)  Completed    Pneumococcal Vaccine: 50+ Years  Completed    Meningococcal B Vaccine  Aged Out    Colonoscopy  Discontinued       Patient current concerns:   This CCM spoke to the patient's daughter-in-law, Diana, who is listed on the patient's HIPAA authorization. She reported that the patient is currently in [place] and is doing overall well. The patient was a no show to an appointment scheduled May 7, 2025, with Dermatologist and also missed his hypertension follow- up with Dr. Chyna Pascual.  This CCM reminded Diana that the patent is scheduled for a follow up appointment with Dr. Pascual on August 20, 2025. Diana acknowledged the reminder and stated that if the patient has not returned by that date, she will contact the office to cancel the appointment.     Diana also reported that the patient has been experiencing itching and swelling in his legs and mentioned having a scratch that appears to be infected. Diana stated that, according to the patient, he sought medical evaluation yesterday while in [place]. As of the time of this call, she has not yet heard from the patient today for any updates.     Goals/Action Plan:     Active goal from previous outreach: Stay active.       Patient reported progress towards goals: ongoing.                - What: Increase activity.            - Where/When/How: Increase activity.   Patient Reported Barriers and Concerns: n/a                   - Plan for overcoming barriers: n/a      Care managers interventions:    CCM encouraged the patient to continue having a balanced diet/good nutrition to help maintain a good weight, to help fight off infection, and help reduce the risk of developing other chronic issues.   CCM motivated the patient to increase physical activity to help maintain independence and improve quality of life.  CCM informed the patient that stretching daily, daily walking a couple times a week. Activity improves strength, flexibility, and balance. CCM encouraged the patient to stay active.  Next month CCM will review the patient's health, eating habits, exercising routine and progress towards goal.     Reconciled the patient's chart using care everywhere.     Updated health maintenance by documenting fall assessment, Annual Depression Screening.    Reviewed medication list, reviewed care team, and reviewed health maintenance.     CCM reminded the patient of overdue vaccines. Immunization query completed. No updates available currently.    This CCM actively listened to the patient's concerns, provided emotional support and encouraged the patient to reach out if the patient needed further assistance.   Appointments reviewed with the patient.     Future Appointments   Date Time Provider Department Center   8/20/2025  1:00 PM Chyna Pascual DO [facility] [place]        Next Care Manager Follow Up Date: 4-6 Weeks.    Reason For Follow Up: review progress and barriers towards patient's goals.     Time Spent This Encounter Total: 20 min medical record review, telephone communication, care plan updates where needed, education, goals, and action plan recreation/update. Provided acknowledgment and validation to patient's concerns.   Monthly  Minute Total including today: 24 min.  Physical assessment, complete health history, and need for CCM established by Chyna Pascual DO.

## 2025-07-14 ENCOUNTER — PATIENT OUTREACH (OUTPATIENT)
Dept: CASE MANAGEMENT | Age: OVER 89
End: 2025-07-14

## 2025-08-20 ENCOUNTER — TELEPHONE (OUTPATIENT)
Dept: FAMILY MEDICINE CLINIC | Facility: CLINIC | Age: OVER 89
End: 2025-08-20

## (undated) DEVICE — SUTURE ETHILON 2-0 FS

## (undated) DEVICE — PROXIMATE LINEAR CUTTER RELOAD (STNADARD) , BLUE, 55MM: Brand: PROXIMATE

## (undated) DEVICE — LAPAROTOMY CDS: Brand: MEDLINE INDUSTRIES, INC.

## (undated) DEVICE — PROXIMATE RELOADABLE LINEAR STAPLER: Brand: PROXIMATE

## (undated) DEVICE — SUTURE PROLENE 1 CT-1

## (undated) DEVICE — KENDALL SCD EXPRESS SLEEVES, KNEE LENGTH, MEDIUM: Brand: KENDALL SCD

## (undated) DEVICE — PROXIMATE RELOADABLE LINEAR CUTTER WITH SAFETY LOCK-OUT.  55MM LINEAR CUTTER.: Brand: PROXIMATE

## (undated) DEVICE — SUTURE VICRYL 2-0

## (undated) DEVICE — DRESSING OPTIFOAM AG 3.5X10

## (undated) DEVICE — SOL  .9 1000ML BTL

## (undated) DEVICE — STERILE POLYISOPRENE POWDER-FREE SURGICAL GLOVES: Brand: PROTEXIS

## (undated) DEVICE — PROXIMATE SKIN STAPLERS (35 WIDE) CONTAINS 35 STAINLESS STEEL STAPLES (FIXED HEAD): Brand: PROXIMATE

## (undated) DEVICE — DRAIN CHANNEL 19FR BLAKE

## (undated) DEVICE — VIOLET BRAIDED (POLYGLACTIN 910), SYNTHETIC ABSORBABLE SUTURE: Brand: COATED VICRYL

## (undated) DEVICE — 3M(TM) MICROPORE TAPE DISPENSER 1535-2: Brand: 3M™ MICROPORE™

## (undated) DEVICE — SUTURE PROLENE 2-0 SH

## (undated) DEVICE — DRAIN RELIAVAC 100CC

## (undated) NOTE — LETTER
Date: 12/3/2021    Patient Name: Art Pap          To Whom it may concern: The above patient was seen at the [facility] for treatment of a medical condition.     This patient has been advised not to fly for the next 3 months

## (undated) NOTE — MR AVS SNAPSHOT
[facility]  [phone]               Thank you for choosing us for your health care visit with Gracie Hill MD.  We are glad to serve you and happy to provide you with this espinoza Educational Information     Healthy Diet and Regular Exercise  [employer] [facility] [address] for making healthy food choices  -   Enjoy your food, but eat less. Fully enjoy your food when eating. Don’t eat while distracted and slow down.    Avoid

## (undated) NOTE — LETTER
Date: 1/11/2022    Patient Name: Luanne Ramirez        To Whom it may concern: This letter has been written at the patient's request. The above patient was seen at the [facility] for treatment of a medical condition.     This macie

## (undated) NOTE — Clinical Note
I had the pleasure of seeing Mr. Cesilia Baumann in my office today. Please see my attached note.       Sagrario Valencia

## (undated) NOTE — Clinical Note
FYI, TCM call made, see notes. Patient had an OV on 3/29/18 scheduled. NCM placed a call to Clinical supervisor and requested the visit type be changed to a TCM HFU.

## (undated) NOTE — Clinical Note
Please call patient but speak to a family member and remind them to have pt do fasting labs at earliest convenience.

## (undated) NOTE — LETTER
12/13/18        [address]      Dear Josephine Olmos records indicate that you have outstanding lab work and or testing that was ordered for you and has not yet been completed:        Magnesium [E]    To

## (undated) NOTE — LETTER
06/09/18          Dear:Benjy Rosales     In an effort to provide you with the best possible care for your diabetes, we ask that you please schedule the following appointment(s) as indicated below.   Our records indicate that you are in need of th

## (undated) NOTE — Clinical Note
I had the pleasure of seeing Mr. Ciara Whalen in my office today. Please see my attached note.       Leda Model

## (undated) NOTE — LETTER
Your patient was recently seen at the [facility] for a hospital follow-up visit. The visit note is attached. Please contact the clinic with any questions at [phone].    Thank you,  CHRISTY Kim

## (undated) NOTE — LETTER
[facility] HEALTH MANAGEMENT DEPARTMENT  [address] [place] [id number]  [phone]          September 4, 2024    Dear Benjy:    It was a pleasure speaking with you over the phone recently. To follow up, I wanted to send you my contact information to utilize when you have a question and or need some assistance. We are excited that you have decided to give our Chronic Care Management program a try. I am available to provide you with the support and education needed to keep you healthy.     Together We Will Work On:    Coordination of Care: Helping to reduce duplicate orders/tests to save you time and money  Medications: Review, Educate, & Discuss any concerns or issues you may have  Personalized education and support regarding your health.    These services, among others will help you take control of your health and provide you with optimal quality care. I have attached a more in depth review of the program to help outline the information we had talked about over the phone. If you have any questions or concerns please feel free to contact myself your Health Care Manager and/or your insurance company for more details.     I look forward to working with you,  SHANE Lara   Health  Care Management   [facility]    [email or web address]   ([phone] work      [address] [place] [address]         [email or web address]

## (undated) NOTE — Clinical Note
I had the pleasure of seeing Mr. Regi Allen in my office today. Please see my attached note.       Yris Schultz

## (undated) NOTE — LETTER
18    Patient: Tamara Carvajal  : 1935 Visit date: 2018    Dear  Dr. Tran Browning, DO,    Thank you for referring Tamara Carvajal to my practice. Please find my assessment and plan below.          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 The patient has no dietary restrictions.   He

## (undated) NOTE — Clinical Note
FYI, TCM call made, see notes. NCM confirmed TCM HFU appointment on 4/16/18. Message sent to MD's office re:DME ordered?

## (undated) NOTE — LETTER
Date: 1/5/2021    Patient Name: Bernarda Knapp  1/21/1935          To Whom it may concern: This letter has been written at the patient's request. The above patient was seen at the [facility] for treatment of a medical condition.

## (undated) NOTE — LETTER
Chance  Benjy ,      Jaelyn se había inscrito previamente en nuestro programa de Manejo de Condiciones Crónicas y había estado en comunicación con espinoza entrenador/a de saritha.    Actualmente, estamos graduando a los pacientes que gallego mantenido un buen estado de saritha.  Ha logrado un gran progreso en el manejo de espinoza saritha, y me siento orgulloso/a de los pasos que ha tomado según eagle chequeos recientes y lo yony que está siguiendo espinoza plan de cuidado.    Si tiene alguna pregunta o inquietud, o si considera que necesita apoyo adicional, por favor comuníquese con espinoza médico de atención primaria.    Fue un placer trabajar con jaelyn.         [facility] Department

## (undated) NOTE — LETTER
[facility]  Jack Pace 61 [address] [facility], [address] [place] [address]    Consent for Operation    Date: __________________    Time: _______________    1.  I authorize the performance upon [facility] the following operation:    Procedure(s):  VENTRAL HER original videotape. [facility] will not be responsible for storage or maintenance of this tape. 6. For the purpose of advancing medical education, I consent to the admittance of observers to the Operating Room.     7. I authorize the use of any specime ___________________________    When the patient is a minor or mentally incompetent to give consent:  Signature of person authorized to consent for patient: ___________________________   Relationship to patient: _____________________________________________ these medicines may increase my risk of anesthetic complications. · If I am allergic to anything or have had a reaction to anesthesia before. 3. I understand how the anesthesia medicine will help me (benefits).     4. I understand that with any type of questions. The patient or their representative has agreed to have anesthesia services.     _____________________________________________________________________________  Witness        Date   Time  I have verified that the signature is that of the patient o

## (undated) NOTE — LETTER
4/24/2025    Benjy Randolph  [address] [place] [address]    Dear Benjy,    Due to a missed appointment on 4/24/25, your account has been charged $50.    No-show policy    A $50 fee will be charged for missed appointments.    To accommodate all our patients, we require at least 24 hours' notice if you need to cancel or reschedule your appointment.    If three appointments are missed within a 12-month period, we may begin the dismissal process for future care at [facility].    We value the relationship we have established with you.  We hope to continue to serve your health care needs.      Sincerely,    [facility]

## (undated) NOTE — IP AVS SNAPSHOT
Patient Demographics     Address  [address] [place] [address] Phone  [phone] (Home)  [phone] (Mobile) *Preferred* E-mail Address  [email or web address]      Patient Contacts     Name Relation Home Work Mobile    Lizbeth Chaparro [phone]    Marek Chaparro [phone]    Diana Chaparro [phone]    Petra Chaparro [phone]      Allergies as of 4/5/2024  Review status set to In Progress on 4/2/2024   No Known Allergies     Code Status Information     Code Status    Full Code        Patient Instructions       - Use sling to right arm as needed/as tolerated  - Follow up with Infectious Disease as needed--> 5 more days of oral antibiotic       From rheumatology-   -- take prednisone taper as ordered  -- repeat labs in 2 weeks as outpatient  -- follow up with ortho regarding frozen shoulder  -- we will be in communication with test results when available  Dr. Heather Ortiz DO  [facility]  4/5/2024       Follow-up Information     Chyna Pascual DO. Schedule an appointment as soon as possible for a visit in 1 week(s).    Specialties: Family Medicine, IP Consult to Primary Care  Contact information:  [address]  [facility] [address]  [phone]             Tata Zhang MD. Schedule an appointment as soon as possible for a visit.    Specialties: SURGERY, ORTHOPEDIC, Surgery, Orthopaedic  Why: As needed  Contact information:  [address]  [facility] [address]  [phone]             Lyudmila Stringer MD Follow up.    Specialties: INFECTIOUS DISEASES, Internal Medicine  Why: As needed  Contact information:  [address]  [address]  [facility] [address]  [phone]             Heather Ortiz DO. Call in 2 week(s).    Specialty: RHEUMATOLOGY  Contact information:  [address]  [facility] [address]  [phone]                        Your Home Meds List      TAKE  these medications       Instructions Authorizing Provider Morning Afternoon Evening As Needed   acetaminophen 325 MG Tabs  Commonly known as: Tylenol      Take 1 tablet (325 mg total) by mouth every 6 (six) hours as needed for Pain.          amLODIPine 10 MG Tabs  Commonly known as: Norvasc      Take 1 tablet (10 mg total) by mouth at bedtime.   Chyna Pascual         carbidopa-levodopa  MG Tabs  Commonly known as: SINEMET      TAKE 1 TABLET BY MOUTH FOUR TIMES DAILY(7AM, 11AM, 3PM, 7PM)   Yaron De La Paz         cephalexin 500 MG Caps  Commonly known as: Keflex  Notes to patient: Take with food      Take 1 capsule (500 mg total) by mouth 3 (three) times daily for 5 days.  Stop taking on: April 10, 2024   Lyudmila Stringer         lisinopril 20 MG Tabs  Commonly known as: Prinivil; Zestril      Take 1 tablet (20 mg total) by mouth at bedtime.   Chyna Pascual         predniSONE 10 MG Tabs  Commonly known as: Deltasone  Start taking on: April 6, 2024  Notes to patient: Follow listed instructions      Take 40mg daily x 3 days, then 30mg daily x 3 days, then 20mg daily x 3 days, then 10mg daily x 3 days then stop   Heather Mullerilva         rosuvastatin 5 MG Tabs  Commonly known as: Crestor      Take 1 tablet (5 mg total) by mouth nightly.   Chyna Pascual               Where to Get Your Medications      These medications were sent to [id number] - [place], [place] - [address] AT [facility] [address], [phone], [phone]  [address], [place] [address]    Hours: 24-hours Phone: [phone]   cephalexin 500 MG Caps  predniSONE 10 MG Tabs           [id number] - MAR ACTION REPORT  (last 48 hrs)    ** SITE UNKNOWN **     Order ID Medication Name Action Time Action Reason Comments    [id number] acetaminophen-codeine (Tylenol #3) 300-30 MG per tab 1 tablet 04/03/24 2023 Given      [id number] acetaminophen-codeine (Tylenol #3) 300-30 MG per tab 1 tablet 04/05/24 1524 Given      [id number]  amLODIPine (Norvasc) tab 10 mg 04/03/24 2023 Given      [id number] amLODIPine (Norvasc) tab 10 mg 04/04/24 2128 Given      [id number] carbidopa-levodopa (SINEMET)  MG per tab 1 tablet 04/03/24 2024 Given      [id number] carbidopa-levodopa (SINEMET)  MG per tab 1 tablet 04/04/24 2128 Given      [id number] ceFAZolin (Ancef) 2 g in 20mL IV syringe premix 04/03/24 2305 Given      [id number] ceFAZolin (Ancef) 2 g in 20mL IV syringe premix 04/04/24 0930 Given      [id number] ceFAZolin (Ancef) 2 g in 20mL IV syringe premix 04/04/24 1634 Given      [id number] ceFAZolin (Ancef) 2 g in 20mL IV syringe premix 04/04/24 2335 Given      [id number] ceFAZolin (Ancef) 2 g in 20mL IV syringe premix 04/05/24 0807 Given      [id number] ceFAZolin (Ancef) 2 g in 20mL IV syringe premix 04/05/24 1524 Given      [id number] lisinopril (Prinivil; Zestril) tab 20 mg 04/03/24 2023 Given      [id number] lisinopril (Prinivil; Zestril) tab 20 mg 04/04/24 2128 Given      [id number] morphINE PF 2 MG/ML injection 2 mg (Or Linked Group #1) 04/03/24 2138 Given      [id number] rosuvastatin (Crestor) tab 5 mg 04/03/24 2024 Given      [id number] rosuvastatin (Crestor) tab 5 mg 04/04/24 2127 Given            LEFT LOWER ABDOMEN     Order ID Medication Name Action Time Action Reason Comments    [id number] enoxaparin (Lovenox) 40 MG/0.4ML SUBQ injection 40 mg 04/03/24 2024 Given      [id number] enoxaparin (Lovenox) 40 MG/0.4ML SUBQ injection 40 mg 04/04/24 2131 Given              Recent Vital Signs    Flowsheet Row Most Recent Value   /55 Filed at 04/05/2024 1148   Pulse 58 Filed at 04/05/2024 1148   Resp 20 Filed at 04/05/2024 1148   Temp 98.6 °F (37 °C) Filed at 04/05/2024 1148   SpO2 95 % Filed at 04/05/2024 1148      Patient's Most Recent Weight    Flowsheet Row Most Recent Value   Patient Weight 91.2 kg (201 lb)         Lab Results Last 24 Hours      Sed Rate, [place] (Automated) [[id number]] (Abnormal)  Resulted: 04/05/24 0824, Result status: Final  result   Ordering provider: Heather Ortiz DO  04/04/24 2300 Resulting lab: [facility] ([facility])    Specimen Information    Type Source Collected On   Blood — 04/05/24 0458          Components    Component Value Reference Range Flag Lab   Sed Rate 72 0 - 20 mm/Hr [facility] ([facility])            Rheumatoid Arthritis Factor [[id number]] (Normal)  Resulted: 04/05/24 0621, Result status: Final result   Ordering provider: Heather Ortiz DO  04/04/24 2300 Resulting lab: [facility] ([facility])    Specimen Information    Type Source Collected On   Blood — 04/05/24 0458          Components    Component Value Reference Range Flag Lab   Rheumatoid Factor <10 <15 IU/mL — [facility] ([facility])            C-Reactive Protein [[id number]] (Abnormal)  Resulted: 04/05/24 0617, Result status: Final result   Ordering provider: Heather Ortiz DO  04/04/24 2300 Resulting lab: [facility] ([facility])    Specimen Information    Type Source Collected On   Blood — 04/05/24 0458          Components    Component Value Reference Range Flag Lab   C-Reactive Protein 5.98 <0.30 mg/dL [facility])            Testing Performed By     Lab - Abbreviation Name Director Address Valid Date Range    139 - [facility] ([facility]) [facility] ([facility]) Goldberg, Cathryn A. MD [address] [place] [id number] 03/19/20 1441 - Present            Microbiology Results (All)     Procedure Component Value Units Date/Time    Blood Culture [[id number]] Collected: 04/02/24 1345    Order Status: Completed Lab Status: Preliminary result Updated: 04/04/24 1901    Specimen: Blood,peripheral      Blood Culture Result No Growth 2 Days    Blood Culture [[id number]] Collected: 04/02/24 1347    Order Status: Completed Lab Status: Preliminary result Updated: 04/04/24 1901    Specimen: Blood,peripheral      Blood Culture Result No Growth 2 Days    Narrative:      Aerobic Bottle Volume - 7  mL  Anaerobic Bottle Volume - 10 mL      Pending Labs     Order Current Status    CCP Antibodies IgG/IgA In process    Blood Culture Preliminary result    Blood Culture Preliminary result         H&P - H&P Note      H&P signed by Desiree Arreola MD at 2024 11:49 PM  Version 1 of 1    Author: Desiree Arreola MD Service: — Author Type: Physician    Filed: 2024 11:49 PM Date of Service: 2024  4:32 PM Status: Signed    : Desiree Arreola MD (Physician)       [facility]                                                               History & Physical         Benjy Randolph Patient Status:  Emergency    1935 MRN [id number]   Location [facility] EMERGENCY DEPARTMENT Attending Lainey Sanabria MD   Hosp Day # 0 PCP Chyna Pascual DO     Chief Complaint: Right hand redness and swelling and pain, right shoulder pain with inability to lift his right arm up    History of Present Illness:  Benjy Randolph is a 89 year old male admitted with Right hand redness and swelling and pain, right shoulder pain with inability to lift his right arm up.  Symptoms started 2 days back according to patient and patient's daughter at bedside and also patient's granddaughter over phone who is translating [place]ian for patient per patient's wishes.  Patient was recently admitted to the hospital at [facility] according to patient's granddaughter.  According to patient's granddaughter patient was diagnosed with left elbow bursitis which was felt to be infected and had a PICC line in his right arm and was on IV antibiotics with IV Rocephin until this Friday, at that time PICC line was discontinued on this Friday according to patient's daughter and granddaughter.  Patient's left elbow improved.  2 days back started to have worsening pain and redness and swelling and stiffness on right arm, has right shoulder pain and unable to lift the right shoulder up  according to patient and patient's daughter and granddaughter.  Also had previous history of gout according to patient's daughter and granddaughter.  Patient afebrile.  Denies any chest pain or shortness of breath.  Denies any nausea vomiting.  Denies any abdominal pain.  Denies any constipation or diarrhea.    History:  Past Medical History:   Diagnosis Date    Abdominal pain of unknown etiology 10/17/2020    Alcoholism /alcohol abuse 8/30/2017    Episodic    Anesthesia complication     difficulty awakening after a surgery many years ago- needed defibrillator shock to come out per daughter,    Aortic sclerosis 2/13/2015    Aortic valve sclerosis 9/29/2015    Arthritis     Atrophic gastritis 9-    chronic, inactive    Basal cell carcinoma (BCC) 8/4/2020    BPH (benign prostatic hyperplasia)     Cancer (HCC)     skin cancer ? kind    Cataract     HAD SURGERY- ??HAS LENS IMPLANTS    Change in vision 8/4/2020    Chronic diastolic heart failure (HCC) 10/24/2020    Colon cancer (HCC) 2010    Colon polyps 9-    COVID-19 virus infection 11/24/2020    Dermatitis 10/26/2018    New. Right forearm    EKG, abnormal 2/13/2015    Elevated hemoglobin (HCC) 4/16/2018    Esophageal reflux     no longer a problem/ resolved    Essential hypertension, benign 2/5/2015    Uncontrolled     Heart murmur     Herpes zoster without complication 10/24/2020    High cholesterol     History of cardiac murmur     History of colon cancer 8/4/2020    History of hernia repair 2/11/2015    [place] in [place]     History of skin cancer 9/27/2016    Hypertensive urgency     Malignant neoplasm of colon (HCC) 2/27/2012    Murmur, heart     benign    Osteoarthritis     right shouder- surgery scheduled 06/30/16, also left shoulder    Other and unspecified hyperlipidemia     Parkinson disease (HCC) 2014    Personal history of antineoplastic chemotherapy     2010    Pneumonia due to COVID-19 virus 12/11/2020    PONV (postoperative  nausea and vomiting)     Postherpetic neuralgia 11/16/2020    Prurigo 8/4/2020    Retained suture 9/29/2015    S/P repair of ventral hernia 3/30/2018    SBO (small bowel obstruction) (HCC) 3/30/2018    Shoulder injury 1987    left     Suture granuloma 10/3/2017    Tubular adenoma 9-    Uncomfortable fullness after meals     Ventral hernia without obstruction or gangrene 3/21/2018    Ventral incisional hernia 11/2/2017       Past Surgical History:   Procedure Laterality Date    APPENDECTOMY      ARTHROSCOPY OF JOINT UNLISTED Bilateral     SURGERY ON BOTH SHOULDERS - UNSURE IF SCOPE OR OPEN    CATARACT Bilateral     COLECTOMY      COLONOSCOPY  9-    Dr. Priscilla [place], [place]    HERNIA SURGERY      SKIN SURGERY Left 4-14-15    MMS done for SCC, well dif, inv to left antihelix, AB    SKIN SURGERY  5/4/2015    MMS - BCC to the Left [place]     SKIN SURGERY  3/29/17    MMS- BCC-micronod to right nasofacial sulcus done by AB    UPPER GI ENDOSCOPY,EXAM         Family history:  Family History   Problem Relation Age of Onset    Heart Attack Sister     Cancer Neg     Heart Disease Neg     Stroke Neg       Reviewed    Social history:   reports that he quit smoking about 9 years ago. His smoking use included cigarettes. He has a 16.8 pack-year smoking history. He has never used smokeless tobacco. He reports current alcohol use. He reports that he does not use drugs.    Allergies:  No Known Allergies    Home Medications:  (Not in a hospital admission)      Review of Systems:  A comprehensive 14 point review of systems was completed.  Pertinent positives and negatives noted in the the HPI.    Physical Exam:     Vital signs: Blood pressure (!) 182/76, pulse 64, temperature 98.4 °F (36.9 °C), resp. rate 18, SpO2 96%.  General: No acute distress.   HEENT: Moist mucous membranes.  Respiratory: Clear to auscultation bilaterally.  No wheezes. No rhonchi.  Cardiovascular: S1, S2.  Regular rate and rhythm.    Abdomen: Soft,  nontender, nondistended.  Positive bowel sounds.   Neurologic: Awake alert, no focal neurological deficits.  Musculoskeletal: Right shoulder tenderness and stiffness and has right frozen shoulder.  Right upper extremity erythema and swelling and tenderness to palpation.  Psychiatric: Appropriate mood and affect.      Diagnostic Data:      Laboratory Data:   Lab Results   Component Value Date    WBC 9.7 04/02/2024    HGB 13.7 04/02/2024    HCT 39.9 04/02/2024    .0 04/02/2024    CREATSERUM 0.90 04/02/2024    BUN 19 04/02/2024     04/02/2024    K 3.7 04/02/2024     04/02/2024    CO2 28.0 04/02/2024     04/02/2024    CA 9.7 04/02/2024    ALB 3.0 04/02/2024    ALKPHO 100 04/02/2024    BILT 0.3 04/02/2024    TP 7.8 04/02/2024    AST 19 04/02/2024    ALT 12 04/02/2024    CRP 3.11 04/02/2024       Imaging:  Imaging data reviewed in Epic.  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  # Recent left elbow bursitis, with questionable septic arthritis status post IV antibiotics and was seen by ID and Ortho while at [facility] per care everywhere chart review  IV antibiotics  ID and Ortho consult  PT OT eval  Uric acid level normal  On care everywhere chart review, left elbow synovial fluid aspirate culture done on 3/15/2024 at [facility] had no growth  #Hypertension  #History of prior chronic diastolic heart failure  Continue home medications including Norvasc and lisinopril  Does not take any Lasix at home as reported by patient's daughter and granddaughter  Follow blood pressure  Echo done during recent hospital stay at [facility] during recent admission in March 2024 reportedly showed TTE: LVEF 63%. Stage 1 DD. Mild-moderate AVS.  No significant other valvular abnormality.  On care everywhere chart review  #Hyperlipidemia  Statin  #Parkinson's disease  Continue home carbidopa levodopa.  Patient's daughter and granddaughter reported that patient self discontinued his carbidopa levodopa when he went to visit [facility]  PT OT  #History of gout  #History of colon cancer with prior colectomy and prior chemo  #Osteoarthritis    Quality:  DVT Prophylaxis: DVT Mechanical Prophylaxis:   SCDs,    DVT Pharmacologic Prophylaxis   Medication    enoxaparin (Lovenox) 40 MG/0.4ML SUBQ injection 40 mg              CODE status:   Code Status: Full Code  Parsad: No urinary catheter in place  Central line:  No    Plan of care discussed with patient, patient's daughter at bedside.  Discussed with patient's granddaughter over phone at patient's bedside who is translating [place]an for patient per patient's wishes    Discussed with ER Physician.  Old chart reviewed in care everywhere from recent admission at [facility]    Desiree Arreola MD  2024  4:32 PM      Electronically signed by Desiree Arreola MD on 2024 11:49 PM              Consults - MD Consult Notes      Consults signed by Heather Ortiz DO at 2024  4:47 PM     Author: Heather Ortiz DO Service: Rheumatology Author Type: Physician    Filed: 2024  4:47 PM Date of Service: 2024 12:53 PM Status: Signed    : Heather Ortiz DO (Physician)     Consult Orders    1. Consult to Rheumatology [[id number]] ordered by Desiree Arreola MD at 24 0955             [facility]  Rheumatology Report of Consultation  Benjysarahy Chaparro Agustín Patient Status:  Inpatient    1935 MRN [id number]   Location [facility] 3SW-A Attending Desiree Arreola MD   Hosp Day # 2 PCP Chyna Pascual DO     Date of Admission: 2024  Date of Consult:  2024    Reason for Consultation: polyarthralgia     History of Present Illness: Benjy Randolph is a a(n) 89  year old male who present on 04/02 with worsened pain and swelling.   He was recently admitted at an outside facility (03/22/4) and at that time was diagnosed with a septic arthritis in the right elbow.  He had the elbow aspirated (no crystals were seen).  And he was discharged with continued antibiotics with IV vancomycin and ceftriaxone until 03/28 via PICC line.  He then presented to our ER on 004/02 due to worsening right shoulder pain as well as right hand pain and swelling.    I served a  with the assistance of patient's daughter who is at bedside.  Patient states that he had already developed right shoulder pain during his last hospitalization but was told at that time that they could not doing since he was being treated for the active infection.  At the time, he denies any residual elbow pain or swelling.  At this time his pain is located over the right shoulder as well as the right hand and fingers.  Feels pain is present only with movement and no pain at rest.  Prior to all this he claims that he was able to move his right shoulder fully without any restriction.  Denies any other joint pain  When I asked they deny knowing any history of gout and do not know what gout is.  Denies ever being on medications like allopurinol in the past.  Denies any current fevers, chills, weight loss, fatigue  Denies chest pain, shortness of breath  Denies any skin rashes  History of easy bruising  Denies abdominal pain, nausea, vomiting, diarrhea or constipation.  Denies specific morning stiffness  Denies a history of any other joint swelling, redness or sensitivity to touch aside from the recent right elbow which was deemed to be infectious.  Unclear etiology//trigger to infection    History:  HISTORY:  Past Medical History:   Diagnosis Date    Abdominal pain of unknown etiology 10/17/2020    Alcoholism /alcohol abuse 8/30/2017    Episodic    Anesthesia complication     difficulty awakening after a surgery  many years ago- needed defibrillator shock to come out per daughter,    Aortic sclerosis 2/13/2015    Aortic valve sclerosis 9/29/2015    Arthritis     Atrophic gastritis 9-    chronic, inactive    Basal cell carcinoma (BCC) 8/4/2020    BPH (benign prostatic hyperplasia)     Cancer (HCC)     skin cancer ? kind    Cataract     HAD SURGERY- ??HAS LENS IMPLANTS    Change in vision 8/4/2020    Chronic diastolic heart failure (HCC) 10/24/2020    Colon cancer (HCC) 2010    Colon polyps 9-    COVID-19 virus infection 11/24/2020    Dermatitis 10/26/2018    New. Right forearm    EKG, abnormal 2/13/2015    Elevated hemoglobin (HCC) 4/16/2018    Esophageal reflux     no longer a problem/ resolved    Essential hypertension, benign 2/5/2015    Uncontrolled     Heart murmur     Herpes zoster without complication 10/24/2020    High cholesterol     History of cardiac murmur     History of colon cancer 8/4/2020    History of hernia repair 2/11/2015    First in [place]     History of skin cancer 9/27/2016    Hypertensive urgency     Malignant neoplasm of colon (HCC) 2/27/2012    Murmur, heart     benign    Osteoarthritis     right shouder- surgery scheduled 06/30/16, also left shoulder    Other and unspecified hyperlipidemia     Parkinson disease (HCC) 2014    Personal history of antineoplastic chemotherapy     2010    Pneumonia due to COVID-19 virus 12/11/2020    PONV (postoperative nausea and vomiting)     Postherpetic neuralgia 11/16/2020    Prurigo 8/4/2020    Retained suture 9/29/2015    S/P repair of ventral hernia 3/30/2018    SBO (small bowel obstruction) (formerly [facility]) 3/30/2018    Shoulder injury 1987    left     Suture granuloma 10/3/2017    Tubular adenoma 9-    Uncomfortable fullness after meals     Ventral hernia without obstruction or gangrene 3/21/2018    Ventral incisional hernia 11/2/2017      Past Surgical History:   Procedure Laterality Date    APPENDECTOMY      ARTHROSCOPY OF JOINT  UNLISTED Bilateral     SURGERY ON BOTH SHOULDERS - UNSURE IF SCOPE OR OPEN    CATARACT Bilateral     COLECTOMY      COLONOSCOPY  2014    Dr. Priscilla [place], [place]    HERNIA SURGERY      SKIN SURGERY Left 4-14-15    MMS done for SCC, well dif, inv to left antihelix, AB    SKIN SURGERY  2015    MMS - BCC to the Left [place]     SKIN SURGERY  3/29/17    MMS- BCC-micronod to right nasofacial sulcus done by AB    UPPER GI ENDOSCOPY,EXAM        Family History   Problem Relation Age of Onset    Heart Attack Sister     Cancer Neg     Heart Disease Neg     Stroke Neg       Social History     Socioeconomic History    Marital status:    Tobacco Use    Smoking status: Former     Packs/day: 0.25     Years: 67.00     Additional pack years: 0.00     Total pack years: 16.75     Types: Cigarettes     Quit date: 3/10/2015     Years since quittin.0    Smokeless tobacco: Never   Vaping Use    Vaping Use: Never used   Substance and Sexual Activity    Alcohol use: Yes     Comment: socially    Drug use: No   Other Topics Concern     Service No    Blood Transfusions No    Caffeine Concern Yes     Comment: 2 cups of coffee daily. 1 soda weekly    Occupational Exposure No    Hobby Hazards No    Sleep Concern Yes    Stress Concern No    Weight Concern No    Special Diet No    Back Care No    Exercise Yes     Comment: Daily walking    Bike Helmet No    Seat Belt Yes    Self-Exams No     Social Determinants of Health     Food Insecurity: No Food Insecurity (2024)    Food Insecurity     Food Insecurity: Never true   Transportation Needs: No Transportation Needs (2024)    Transportation Needs     Lack of Transportation: No   Housing Stability: Low Risk  (2024)    Housing Stability     Housing Instability: No            Allergies: No Known Allergies    Medications:    methylPREDNISolone sodium succinate (Solu-MEDROL) injection 60 mg  60 mg Intravenous Daily    [COMPLETED] ceFAZolin (Ancef) 2 g in 20mL IV syringe  premix  2 g Intravenous Once    HYDROmorphone (Dilaudid) 1 MG/ML injection 0.5 mg  0.5 mg Intravenous Q30 Min PRN    [COMPLETED] ondansetron (Zofran) 4 MG/2ML injection 4 mg  4 mg Intravenous Once    amLODIPine (Norvasc) tab 10 mg  10 mg Oral Nightly    lisinopril (Prinivil; Zestril) tab 20 mg  20 mg Oral Nightly    rosuvastatin (Crestor) tab 5 mg  5 mg Oral Nightly    carbidopa-levodopa (SINEMET)  MG per tab 1 tablet  1 tablet Oral Nightly    enoxaparin (Lovenox) 40 MG/0.4ML SUBQ injection 40 mg  40 mg Subcutaneous Nightly    melatonin tab 3 mg  3 mg Oral Nightly PRN    ondansetron (Zofran) 4 MG/2ML injection 4 mg  4 mg Intravenous Q6H PRN    ceFAZolin (Ancef) 2 g in 20mL IV syringe premix  2 g Intravenous Q8H    morphINE PF 2 MG/ML injection 0.5 mg  0.5 mg Intravenous Q2H PRN    Or    morphINE PF 2 MG/ML injection 1 mg  1 mg Intravenous Q2H PRN    Or    morphINE PF 2 MG/ML injection 2 mg  2 mg Intravenous Q2H PRN    [COMPLETED] potassium chloride (K-Dur) tab 40 mEq  40 mEq Oral Once    acetaminophen-codeine (Tylenol #3) 300-30 MG per tab 1 tablet  1 tablet Oral Q4H PRN    acetaminophen (Tylenol Extra Strength) tab 500 mg  500 mg Oral Q6H PRN       Review of Systems: as above    Physical Exam:   /48 (BP Location: Left arm)   Pulse 53   Temp 97.7 °F (36.5 °C) (Oral)   Resp 16   Wt 201 lb (91.2 kg)   SpO2 93%   BMI 32.44 kg/m²   Temp (24hrs), Av °F (36.7 °C), Min:97.7 °F (36.5 °C), Max:98.8 °F (37.1 °C)       Intake/Output Summary (Last 24 hours) at 2024 1647  Last data filed at 2024 1559  Gross per 24 hour   Intake 520 ml   Output 3 ml   Net 517 ml     Wt Readings from Last 3 Encounters:   24 201 lb (91.2 kg)   24 201 lb (91.2 kg)   24 201 lb (91.2 kg)     General: Alert and oriented in no apparent distress.  HEENT: No focal deficits.  Cardiac: Regular rate and rhythm, S1, S2 normal, + holosystolic murmur heard at all posts.   Lungs: Clear without wheezes, rales,  rhonchi or dullness.  Normal excursions and effort.  Abdomen: Soft, non-tender. .  Neurologic: Alert and oriented, normal affect. Moves all four extremities   Skin: Warm and dry.  Slight redness over right hand  MSK: Right shoulder significant restriction in all fields with tenderness. Rigth elbow without tenderness but cannot fully extend d/t shoulder pain. Right hand swollen- dorsal hand and fingers diffusely. Remainder of exam grossly normal- no tenderness/swelling over left dips, pips, mcps, wrist, elbow, shoulder, b/l knees, ankles or joints of feet     Laboratory Data:  Component      Latest Ref Rng 4/2/2024 4/3/2024   WBC      4.0 - 11.0 x10(3) uL 9.7  7.5    RBC      3.80 - 5.80 x10(6)uL 4.39  4.27    Hemoglobin      13.0 - 17.5 g/dL 13.7  13.2    Hematocrit      39.0 - 53.0 % 39.9  39.4    Platelet Count      150.0 - 450.0 10(3)uL 255.0  226.0    MCV      80.0 - 100.0 fL 90.9  92.3    MCH      26.0 - 34.0 pg 31.2  30.9    MCHC      31.0 - 37.0 g/dL 34.3  33.5    RDW      % 12.0  11.9    Prelim Neutrophil Abs      1.50 - 7.70 x10 (3) uL 6.42  4.72    Neutrophils Absolute      1.50 - 7.70 x10(3) uL 6.42  4.72    Lymphocytes Absolute      1.00 - 4.00 x10(3) uL 2.11  1.87    Monocytes Absolute      0.10 - 1.00 x10(3) uL 0.92  0.68    Eosinophils Absolute      0.00 - 0.70 x10(3) uL 0.12  0.10    Basophils Absolute      0.00 - 0.20 x10(3) uL 0.07  0.07    Immature Granulocyte Absolute      0.00 - 1.00 x10(3) uL 0.04  0.02    Neutrophils %      % 66.4  63.3    Lymphocytes %      % 21.8  25.1    Monocytes %      % 9.5  9.1    Eosinophils %      % 1.2  1.3    Basophils %      % 0.7  0.9    Immature Granulocyte %      % 0.4  0.3    Glucose      70 - 99 mg/dL 114 (H)  136 (H)    Sodium      136 - 145 mmol/L 138  137    Potassium      3.5 - 5.1 mmol/L 3.7  4.4    Potassium      3.5 - 5.1 mmol/L  4.4    Chloride      98 - 112 mmol/L 104  105    Carbon Dioxide, Total      21.0 - 32.0 mmol/L 28.0  29.0    ANION GAP      0  - 18 mmol/L 6  3    BUN      9 - 23 mg/dL 19  18    CREATININE      0.70 - 1.30 mg/dL 0.90  0.95    CALCIUM      8.5 - 10.1 mg/dL 9.7  9.2    CALCULATED OSMOLALITY      275 - 295 mOsm/kg 289  288    EGFR      >=60 mL/min/1.73m2 82  77    AST (SGOT)      15 - 37 U/L 19     ALT (SGPT)      16 - 61 U/L 12 (L)     ALKALINE PHOSPHATASE      45 - 117 U/L 100     Total Bilirubin      0.1 - 2.0 mg/dL 0.3     PROTEIN, TOTAL      6.4 - 8.2 g/dL 7.8     Albumin      3.4 - 5.0 g/dL 3.0 (L)     Globulin      2.8 - 4.4 g/dL 4.8 (H)     A/G Ratio      1.0 - 2.0  0.6 (L)     C-REACTIVE PROTEIN      <0.30 mg/dL 3.11 (H)     LACTIC ACID      0.4 - 2.0 mmol/L 1.2     URIC ACID      3.5 - 7.2 mg/dL 5.2        OSH:  03/2024  ESR 35  CRP 49.2  Uric acid 4.7  Component  Ref Range & Units 3/15/24  1:22 AM   Color, Body Fluid Pink   Clarity, Body Fluid  Clear 2+ Abnormal    Total Nucleated Cells, Body Fluid  cells/uL 78,760   RBC, Body Fluid  cells/uL 10,000   Neutrophils, Body Fluid  % 93   Lymphocytes, Body Fluid  % 1   Monocytes, Body Fluid  % 6   Number of Cells Counted, Body Fluid 100     Component  Ref Range & Units 3/15/24  1:22 AM   Synovial Crystals  No Crystals Seen        Imaging:   Narrative   PROCEDURE:  XR WRIST (2 VIEWS), LEFT (CPT=73100)     INDICATIONS:  swelling / redness     COMPARISON:  None.     PATIENT STATED HISTORY: (As transcribed by Technologist)  Patient offered no additional history at this time.         Impression   CONCLUSION:       Osteopenia degrades sensitivity for fracture detection.  Within this limitation, there is no appreciable acute fracture or traumatic malalignment.  Advanced arthropathy of the radiocarpal and distal radioulnar joints with chronic fragmentation of the  ulnar styloid process.  Mild soft tissue swelling diffusely about the wrist.        LOCATION:  [facility]        Dictated by (CST): Zaynab Turcios MD on 4/03/2024 at 9:03 PM      Finalized by (CST): Zaynab Turcios MD on 4/03/2024 at 9:04 PM        Narrative   PROCEDURE:  XR ELBOW, (2 VIEWS), LEFT (CPT=73070)     INDICATIONS:  swelling and redness     COMPARISON:  None.     PATIENT STATED HISTORY: (As transcribed by Technologist)  Patient offered no additional history at this time.        Impression   CONCLUSION:       Osteopenia degrades sensitivity for fracture detection.  Within this limitation, there is no appreciable acute fracture or traumatic malalignment.  Advanced arthropathy of the radiocapitellar articulation with flattening and degenerative spurring of the  radial head.  No appreciable elbow joint effusion.  No focal soft tissue swelling.        LOCATION:  [facility]        Dictated by (CST): Zaynab Turcios MD on 4/03/2024 at 9:05 PM      Finalized by (CST): Zaynab Turcios MD on 4/03/2024 at 9:06 PM      Narrative   PROCEDURE:  XR SHOULDER, COMPLETE (MIN 2 VIEWS), RIGHT (CPT=73030)     TECHNIQUE:  Multiple views were obtained.     COMPARISON:  None.     INDICATIONS:  R shoulder pain     PATIENT STATED HISTORY: (As transcribed by Technologist)  The patient offered no additional history at this point.         FINDINGS:       Marked loss of right glenohumeral joint space with subchondral sclerosis along with large right humeral head osteophyte.  Inferior glenoid osteophytes noted.     Subacromial osteophytes noted.     Moderate right acromioclavicular joint hypertrophic changes is noted.        Impression   CONCLUSION:  Marked osteoarthritic changes of the right shoulder.  An acute fracture is not identified.        LOCATION:  [facility]        Dictated by (CST): Santos Mejia MD on 4/03/2024 at 11:06 AM      Finalized by (CST): Santos Mejia MD on 4/03/2024 at 11:07 AM      Assessment:  Inflammatory polyarthritis  Right shoulder adhesive capsulitis   Cellulitis right UE  Recent elbow septic arthritis vs septic bursitis s/p IV abx  HTN  HLD  Parkinson's  ?hx of gout per chart but denied by pt and pt's daughter     Patient Active Problem List    Diagnosis    Parkinson disease (HCC)    Essential hypertension, benign    Heart murmur    Bilateral lower extremity edema    Right shoulder pain    Glenohumeral arthritis - right    Asymptomatic LV dysfunction    Aortic valve sclerosis    Hypercholesterolemia    Hyperglycemia    LVH (left ventricular hypertrophy)    Left atrial dilation    Mitral valve sclerosis    Chronic diastolic heart failure (HCC)    Primary osteoarthritis of first carpometacarpal joint of right hand    Ground glass opacity present on imaging of lung    Benign prostatic hyperplasia    Prediabetes    Retrolisthesis    History of skin cancer    Advanced age    Frail elderly    Lumbar back pain with radiculopathy affecting right lower extremity    Difficulty walking    Neoplasm of uncertain behavior of skin    Alcohol use disorder, moderate, in sustained remission (HCC)    Small vessel disease (HCC)    Abdominal hernia without obstruction and without gangrene    Hypoalbuminemia due to protein-calorie malnutrition (HCC)    Atherosclerosis of abdominal aorta (HCC)    Calculus of gallbladder without cholecystitis without obstruction    Uncontrolled hypertension    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 Benjy Randolph is a 88 yo man with hx of colon cancer and skin cancer as well as HTN, HLD and OA who was recenty dx of right elbow bursitis, septic arthritis and cellulitis s/p prolonged IV abx. He presents with acute on chronic right shoulder pain with severe limitation as well as right hand swelling. He lacks other obvious signs/symptoms of inflammatory arthritis but does not seem to be the best historian. Will work up for possible PMR vs RA for completeness sake. Unclear if he has hx of gout, recent uric acid levels were well below 6.9, although  not the most dependable during acute attack. Will treat as if nonspecific inflammatory arthritis and do trial of IV methylpred and monitor response to determine oral pred taper. No ULT indicated at this time    Recommendations:   -- discussed case with ID, oksneha with steroids  -- IV methylpred 60mg daily today/tomorrow and monitor response  -- ESR/CRP came back quite elevated compared to previously  -- may benefit from intra-articular steroid injection in the future for adhesive capsulitis   -- RF/CCP with AM labs and monitor ESR/CRP    Thank you for allowing me to participate in the care of your patient.  Discussed with RN and Dr. Myke Ortiz DO  [facility]  4/4/2024    Time spent: 60min      Electronically signed by Heather Ortiz DO on 4/4/2024  4:47 PM           D/C Summary    No notes of this type exist for this encounter.     Imaging Results (HF patients)    Chest X-Ray Results (HF patients only)    No exam resulted this encounter.      2D Echocardiogram Results (HF patients only)    No exam resulted this encounter.      Cath Angiogram Results (HF Patients only)    No exam resulted this encounter.          Physical Therapy Notes (last 72 hours)      Physical Therapy Note signed by Lisa Kong PT at 4/4/2024  3:05 PM  Version 1 of 1    Author: Lisa Kong PT Service: Rehab Author Type: Physical Therapist    Filed: 4/4/2024  3:05 PM Date of Service: 4/4/2024  2:58 PM Status: Signed    : Lisa Kong PT (Physical Therapist)         PHYSICAL THERAPY EVALUATION - INPATIENT     Room Number: 365/365-A  Evaluation Date: 4/4/2024  Type of Evaluation: Initial  Physician Order: PT Eval and Treat    Presenting Problem: R shoulder/elbow pain  Co-Morbidities : LUE septic PICC line, colon cancer, gout, HLD, CHF, HTN, Parkisonsons  Reason for Therapy: Mobility Dysfunction and Discharge Planning    XRAY IMAGING:  Right Shoulder: Marked loss of right glenohumeral joint space with subchondral  sclerosis along with large right humeral head osteophyte.  Inferior glenoid osteophytes noted.   Subacromial osteophytes noted. Moderate right acromioclavicular joint hypertrophic changes is noted.  Right Elbow: Osteopenia degrades sensitivity for fracture detection.  Within this limitation, there is no appreciable acute fracture or traumatic malalignment.  Advanced arthropathy of the radiocapitellar articulation with flattening and degenerative spurring of the   radial head.  No appreciable elbow joint effusion.  No focal soft tissue swelling.   RIGHT WRIST:  Osteopenia degrades sensitivity for fracture detection.  Within this limitation, there is no appreciable acute fracture or traumatic malalignment.  Advanced arthropathy of the radiocarpal and distal radioulnar joints with chronic fragmentation of the ulnar styloid process.  Mild soft tissue swelling diffusely about the wrist.    PHYSICAL THERAPY ASSESSMENT   Patient is a 89 year old male admitted 4/2/2024 for R shoulder pain/ RUE cellulitis.   Patient is currently functioning at baseline with bed mobility, transfers, gait, stair negotiation, maintaining seated position, and standing prolonged periods. Prior to admission, patient's baseline is indep.     Patient currently does not meet criteria for skilled inpatient physical therapy services, however patient will continue to benefit from QID ambulation with nursing staff.  Pt is discharged from IP PT services.  RN aware to re-order if there is a decline in mobility status.      PLAN  Patient has been evaluated and presents with no skilled Physical Therapy needs at this time.  Patient discharged from Physical Therapy services.  Please re-order if a new functional limitation presents during this admission.    GOALS  Patient was able to achieve the following goals ...    Patient was able to transfer At previous, functional level  Safely and independently   Patient able to ambulate on level surfaces At previous,  functional level  Safely and independently     HOME SITUATION  Type of Home: House   Home Layout: Two level  Stairs to Enter : 2  Railing: No  Stairs to Bedroom: 13  Railing: Yes    Lives With: Son  Drives: No  Patient Owned Equipment: None     Prior Level of [place]: Per pt lives in two story home with 2 steps to enter, 13 steps. Has seven children that he rotates staying with for a couple months at a time. Typically independent with all mobility. No use of device. Does not drive.     SUBJECTIVE  OT fluent in Sinhala conversing throughout with pt and translating for writer.       OBJECTIVE  Precautions: Bed/chair alarm (Sling for UE comfort)  Fall Risk: Standard fall risk    WEIGHT BEARING RESTRICTION  Weight Bearing Restriction: None                PAIN ASSESSMENT  Rating: Unable to rate  Location: right shoulder  Management Techniques: Activity promotion;Body mechanics;Repositioning    COGNITION  Overall Cognitive Status:  WFL - within functional limits    RANGE OF MOTION AND STRENGTH ASSESSMENT  Upper extremity ROM and strength are within functional limits   Lower extremity ROM is within functional limits   Lower extremity strength is within functional limits       BALANCE  Static Sitting: Good  Dynamic Sitting: Fair +  Static Standing: Fair  Dynamic Standing: Fair -    ADDITIONAL TESTS                                    ACTIVITY TOLERANCE                         O2 WALK       NEUROLOGICAL FINDINGS                        AM-PAC '6-Clicks' INPATIENT SHORT FORM - BASIC MOBILITY  How much difficulty does the patient currently have...  Patient Difficulty: Turning over in bed (including adjusting bedclothes, sheets and blankets)?: A Little   Patient Difficulty: Sitting down on and standing up from a chair with arms (e.g., wheelchair, bedside commode, etc.): A Little   Patient Difficulty: Moving from lying on back to sitting on the side of the bed?: A Little   How much help from another person does the patient  currently need...   Help from Another: Moving to and from a bed to a chair (including a wheelchair)?: A Little   Help from Another: Need to walk in hospital room?: A Little   Help from Another: Climbing 3-5 steps with a railing?: A Little       AM-PAC Score:  Raw Score: 18   Approx Degree of Impairment: 46.58%   Standardized Score (AM-PAC Scale): 43.63   CMS Modifier (G-Code): CK    FUNCTIONAL ABILITY STATUS  Gait Assessment   Functional Mobility/Gait Assessment  Gait Assistance: Supervision;Modified independent  Distance (ft): 150, 150  Assistive Device: None  Pattern: Within Functional Limits (minor imbalances probably due to parkinsons)  Stairs: Stairs  How Many Stairs: 4  Device: 1 Rail  Assist: Supervision  Pattern: Ascend and Descend  Ascend and Descend : Reciprocal    Skilled Therapy Provided     Bed Mobility: NT- denies concerns with getting in/out of bed    Transfer Mobility:  Sit to stand: supervision   Stand to sit: supervision  Gait = supervision w/o device x 150 feet x 2. Normal gait speed. Minor imbalances when co-tasking (conversing and walking) - demonstrates slight lateral swaying.    Therapist's comments:  Patient presents sitting up in bedside chair. Daughter present in room. Co-treating OTChapin, conversing with patient in [place]. Discussed role and goal of physical therapy in hospital setting. Pt in agreement to session. Reports some pain/discomfort to R shoulder area.  Sit to stand at supervision. Ambulated 150 feet w/o device; see above for specifics. Ascended/descended 4 steps with use of rail at supervision. Ambulated 150 feet back to room w/o device. Upright in chair at end of session. Discussed importance of continued out of bed mobility, encouraged continued functional ambulation with nursing staff as well. Pt and daughter verbalize understanding. No further questions/concerns. RN/PCT aware.     Exercise/Education Provided:  Body mechanics  Energy conservation  Functional activity  tolerated  Gait training  Posture  Transfer training    Patient End of Session: Up in chair;Needs met;Call light within reach;RN aware of session/findings;All patient questions and concerns addressed;Alarm set;Family present;Discussed recommendations with /    Patient Evaluation Complexity Level:  History Moderate - 1 or 2 personal factors and/or co-morbidities   Examination of body systems Low - addressing 1-2 elements   Clinical Presentation Low - Stable   Clinical Decision Making Low Complexity       PT Session Time: 20 minutes  Gait Trainin minutes       Physical Therapy Note signed by Ciara Antonio PT at 4/3/2024 12:57 PM  Version 1 of 1    Author: Ciara Antonio, PT Service: Rehab Author Type: Physical Therapist    Filed: 4/3/2024 12:57 PM Date of Service: 4/3/2024 12:57 PM Status: Signed    : Ciara Antonio PT (Physical Therapist)       Order received for PT eval and chart reviewed. Attempted to see Pt this am, however, ortho consult pending. PT will continue to follow.                 Occupational Therapy Notes (last 72 hours)      Occupational Therapy Note signed by Lavelle Fisher at 2024 11:46 AM  Version 1 of 1    Author: Lavelle Fisher Service: Rehab Author Type: Occupational Therapist    Filed: 2024 11:46 AM Date of Service: 2024 11:28 AM Status: Signed    : Lavelle Fisher (Occupational Therapist)         OCCUPATIONAL THERAPY EVALUATION - INPATIENT    Room Number: 365/365-A  Evaluation Date: 2024     Type of Evaluation: Initial  Presenting Problem: RUE cellulitis    Physician Order: IP Consult to Occupational Therapy  Reason for Therapy:  ADL/IADL Dysfunction and Discharge Planning      OCCUPATIONAL THERAPY ASSESSMENT   Patient is a 89 year old male admitted on 2024 with Presenting Problem: RUE cellulitis. Co-Morbidities : HTN, parkinsons  Patient is currently functioning at baseline with toileting, bathing, upper body dressing,  lower body dressing, grooming, bed mobility, transfers, stating sitting balance, dynamic sitting balance, static standing balance, dynamic standing balance, maintaining seated position, functional standing tolerance, and energy conservation strategies.  Prior to admission, patient's baseline is ind with ADLs has assist for driving.  Patient met all OT goals at Supervision level.  Patient reports no further questions/concerns at this time.     Patient will benefit from continued skilled OT Services at discharge to promote functional independence in home.  Anticipate patient will return home with home health OT      WEIGHT BEARING RESTRICTION  Weight Bearing Restriction: None                Recommendations for nursing staff:   Transfers: Supervision  Toileting location: Toilet    EVALUATION SESSION:  Patient at start of session: Supine in bed  FUNCTIONAL TRANSFER ASSESSMENT  Sit to Stand: Edge of Bed; Chair  Edge of Bed: Supervision  Chair: Supervision  Toilet Transfer: Supervision    BED MOBILITY  Rolling: Supervision  Supine to Sit : Supervision  Sit to Supine (OT): Supervision    BALANCE ASSESSMENT  Static Sitting: Supervision  Sitting Bilateral: Supervision  Static Standing: Supervision  Standing Bilateral: Supervision    FUNCTIONAL ADL ASSESSMENT  Eating: Supervision  Grooming Seated: Supervision  Grooming Standing: Supervision  LB Dressing Seated: Supervision  LB Dressing Standing: Supervision  Toileting Seated: Supervision      ACTIVITY TOLERANCE: Ed regarding pacing and EC  Pulse:  (Asymptomatic vitals throughout session)                      O2 SATURATIONS       COGNITION  Overall Cognitive Status:  WFL - within functional limits  COGNITION ASSESSMENTS       Upper Extremity:   ROM: within functional limits  WFL  Strength: is within functional limits  WFL  Coordination:  Gross motor: WFL  Fine motor: WFL  Sensation: Light touch:  intact    EDUCATION PROVIDED  Patient: Role of Occupational Therapy; Plan of  Care; Discharge Recommendations; Adaptive Equipment Recommendations; DME Recommendations; Functional Transfer Techniques; Fall Prevention; Energy Conservation; Compensatory ADL Techniques; Proper Body Mechanics  Patient's Response to Education: Verbalized Understanding; Returned Demonstration    Equipment used:   Demonstrates functional use    Therapist comments: Pt presents supine in bed. Pt ed regarding role of OT and POC throughout stay. OT facilitated compensatory ADL completion strategies. Pt ed regarding safe functional transfers with proper body mechanics. Pt ed regarding sling for comfort. Pt ed regarding UB dressing and dressing RUE donning first. Pt ed regarding equipment to assist PRN. Pt expressing no additional acute OT needs at this time.      Patient End of Session: Up in chair;Needs met;Call light within reach;RN aware of session/findings;All patient questions and concerns addressed;Alarm set    OCCUPATIONAL PROFILE    HOME SITUATION  Type of Home: House  Home Layout: Two level  Lives With: Son (DIL)    Toilet and Equipment: Standard height toilet  Shower/Tub and Equipment: Tub-shower combo             Drives: No       Prior Level of Function: Ind with ADLs has family to assist PRN    SUBJECTIVE  \"I feel about normal my arm just bothers me\"    PAIN ASSESSMENT  Ratin  Location: RUE  Management Techniques: Breathing techniques;Body mechanics    OBJECTIVE  Precautions: Bed/chair alarm (sling for comfort)  Fall Risk: Standard fall risk    WEIGHT BEARING RESTRICTION  Weight Bearing Restriction: None                AM-PAC ‘6-Clicks’ Inpatient Daily Activity Short Form  -   Putting on and taking off regular lower body clothing?: None  -   Bathing (including washing, rinsing, drying)?: None  -   Toileting, which includes using toilet, bedpan or urinal? : None  -   Putting on and taking off regular upper body clothing?: None  -   Taking care of personal grooming such as brushing teeth?: None  -   Eating  meals?: None    AM-PAC Score:  Score: 24  Approx Degree of Impairment: 0%  Standardized Score (AM-PAC Scale): 57.54      ADDITIONAL TESTS     NEUROLOGICAL FINDINGS        PLAN   Patient has been evaluated and presents with no skilled Occupational Therapy needs at this time.  Patient discharged from Occupational Therapy services.  Please re-order if a new functional limitation presents during this admission.      Patient Evaluation Complexity Level:   Occupational Profile/Medical History LOW - Brief history including review of medical or therapy records    Specific performance deficits impacting engagement in ADL/IADL LOW  1 - 3 performance deficits    Client Assessment/Performance Deficits LOW - No comorbidities nor modifications of tasks    Clinical Decision Making LOW - Analysis of occupational profile, problem-focused assessments, limited treatment options    Overall Complexity LOW     OT Session Time: 33 minutes  Self-Care Home Management: 6 minutes  Therapeutic Activity: 16 minutes  Neuromuscular Re-education: 0 minutes  Therapeutic Exercise: 0 minutes  Cognitive Skills: 0 minutes  Sensory Integrative: 0 minutes  Orthotic Management and Trainin minutes  Can add/delete any of these          Occupational Therapy Note signed by Faye Ramsay at 4/3/2024 10:14 AM  Version 1 of 1    Author: Faye Ramsay Service: Rehab Author Type: Occupational Therapist    Filed: 4/3/2024 10:14 AM Date of Service: 4/3/2024 10:14 AM Status: Signed    : Faye Ramsay (Occupational Therapist)       Attempted to see pt for skilled OT services this date. Pt is currently awaiting an ortho consult. Will re-attempt as schedule allows. Faye Ramsay, 24, 10:14 AM              Video Swallow Study Notes    No notes of this type exist for this encounter.     SLP Notes    No notes of this type exist for this encounter.     Immunizations     Name Date      Covid-19 Sinovac 21     Covid-19 Sinovac 21     Fluad  0.5ml 09/11/17     INFLUENZA 09/28/22     INFLUENZA 12/16/21     INFLUENZA defer-10/18/20     Deferral: Patient Refused     INFLUENZA 01/28/20     INFLUENZA 01/28/20     INFLUENZA 09/27/16     INFLUENZA 09/27/16     INFLUENZA 09/24/15     INFLUENZA 09/24/15     INFLUENZA 01/22/15     INFLUENZA 09/20/13     Pneumococcal (Prevnar 13) 11/09/17     Pneumovax 23 07/28/20       Future Appointments        Provider Department Center    4/9/2024 2:00 PM Shyanne Corea APRN [facility]      Multidisciplinary Problems     Active Goals        Problem: Patient/Family Goals    Goal Priority Disciplines Outcome Interventions   Patient/Family Long Term Goal     Interdisciplinary Progressing    Description: Patient's Long Term Goal:To be healed and back to normal activity    Interventions:  -Pain management  -PT/OT  -Follow up with PCP/Ortho as recommended     Patient/Family Short Term Goal     Interdisciplinary Progressing    Description: Patients Short Term goal: to be able to eat, drink, go to the bathroom, change positions, sit in chair and work with therapy with pain controlled    Interventions:  -Pain management  -Activity as tolerated  -PT/OT  -Follow up with PCP/Ortho as recommended

## (undated) NOTE — MR AVS SNAPSHOT
[facility]  [address], [address] [address] [phone]               Thank you for choosing us for your health care visit with Jo Ann Mena MD.  We are glad to serve you and happy to provide you with this espinoza ? Refills are not addressed on weekends; covering physicians do not authorize routine medications on weekends. ? No narcotics or controlled substances are refilled after noon on Fridays or by on call physicians.   ? By law, narcotics cannot be faxed or albina approved and is a COVERED benefit. Although the [facility] staff does its due diligence, the insurance carrier gives the disclaimer that \"Although the procedure is authorized, this does not guarantee payment. \"    Ultimately the patient is responsible for payme